# Patient Record
Sex: MALE | Race: WHITE | Employment: OTHER | URBAN - METROPOLITAN AREA
[De-identification: names, ages, dates, MRNs, and addresses within clinical notes are randomized per-mention and may not be internally consistent; named-entity substitution may affect disease eponyms.]

---

## 2017-12-04 ENCOUNTER — HOSPITAL ENCOUNTER (OUTPATIENT)
Age: 82
Setting detail: SPECIMEN
Discharge: HOME | End: 2017-12-04
Payer: MEDICARE

## 2017-12-07 LAB — DERMATOLOGY PATHOLOGY REPORT: NORMAL

## 2018-02-05 ENCOUNTER — HOSPITAL ENCOUNTER (OUTPATIENT)
Age: 83
Setting detail: SPECIMEN
Discharge: HOME | End: 2018-02-05
Payer: MEDICARE

## 2018-02-07 LAB — DERMATOLOGY PATHOLOGY REPORT: NORMAL

## 2019-12-05 ENCOUNTER — HOSPITAL ENCOUNTER (OUTPATIENT)
Age: 84
Setting detail: SPECIMEN
Discharge: HOME | End: 2019-12-05
Payer: MEDICARE

## 2019-12-09 LAB — DERMATOLOGY PATHOLOGY REPORT: NORMAL

## 2020-04-15 ENCOUNTER — APPOINTMENT (OUTPATIENT)
Dept: CT IMAGING | Age: 85
DRG: 280 | End: 2020-04-15
Payer: MEDICARE

## 2020-04-15 ENCOUNTER — APPOINTMENT (OUTPATIENT)
Dept: GENERAL RADIOLOGY | Age: 85
DRG: 280 | End: 2020-04-15
Payer: MEDICARE

## 2020-04-15 ENCOUNTER — HOSPITAL ENCOUNTER (INPATIENT)
Age: 85
LOS: 5 days | Discharge: HOME | DRG: 280 | End: 2020-04-20
Attending: EMERGENCY MEDICINE | Admitting: INTERNAL MEDICINE
Payer: MEDICARE

## 2020-04-15 PROBLEM — I48.91 ATRIAL FIBRILLATION WITH RVR (HCC): Status: ACTIVE | Noted: 2020-04-15

## 2020-04-15 LAB
ABSOLUTE EOS #: 0 K/UL (ref 0–0.4)
ABSOLUTE IMMATURE GRANULOCYTE: ABNORMAL K/UL (ref 0–0.3)
ABSOLUTE LYMPH #: 1.2 K/UL (ref 1–4.8)
ABSOLUTE MONO #: 0.8 K/UL (ref 0.1–1.3)
ALBUMIN SERPL-MCNC: 3.8 G/DL (ref 3.5–5.2)
ALBUMIN/GLOBULIN RATIO: ABNORMAL (ref 1–2.5)
ALP BLD-CCNC: 82 U/L (ref 40–129)
ALT SERPL-CCNC: 18 U/L (ref 5–41)
ANION GAP SERPL CALCULATED.3IONS-SCNC: 12 MMOL/L (ref 9–17)
AST SERPL-CCNC: 25 U/L
BASOPHILS # BLD: 1 % (ref 0–2)
BASOPHILS ABSOLUTE: 0.1 K/UL (ref 0–0.2)
BILIRUB SERPL-MCNC: 1.25 MG/DL (ref 0.3–1.2)
BNP INTERPRETATION: ABNORMAL
BUN BLDV-MCNC: 19 MG/DL (ref 8–23)
BUN/CREAT BLD: ABNORMAL (ref 9–20)
C-REACTIVE PROTEIN: 66.1 MG/L (ref 0–5)
CALCIUM SERPL-MCNC: 9.1 MG/DL (ref 8.6–10.4)
CHLORIDE BLD-SCNC: 106 MMOL/L (ref 98–107)
CO2: 21 MMOL/L (ref 20–31)
CREAT SERPL-MCNC: 1.16 MG/DL (ref 0.7–1.2)
D-DIMER QUANTITATIVE: 0.99 MG/L FEU (ref 0–0.59)
DIFFERENTIAL TYPE: ABNORMAL
EOSINOPHILS RELATIVE PERCENT: 0 % (ref 0–4)
GFR AFRICAN AMERICAN: >60 ML/MIN
GFR NON-AFRICAN AMERICAN: >60 ML/MIN
GFR SERPL CREATININE-BSD FRML MDRD: ABNORMAL ML/MIN/{1.73_M2}
GFR SERPL CREATININE-BSD FRML MDRD: ABNORMAL ML/MIN/{1.73_M2}
GLUCOSE BLD-MCNC: 128 MG/DL (ref 70–99)
HCT VFR BLD CALC: 40.2 % (ref 41–53)
HEMOGLOBIN: 13.6 G/DL (ref 13.5–17.5)
IMMATURE GRANULOCYTES: ABNORMAL %
INR BLD: 1.1
LACTATE DEHYDROGENASE: 236 U/L (ref 135–225)
LACTIC ACID, WHOLE BLOOD: NORMAL MMOL/L (ref 0.7–2.1)
LACTIC ACID: 2.1 MMOL/L (ref 0.5–2.2)
LIPASE: 18 U/L (ref 13–60)
LYMPHOCYTES # BLD: 12 % (ref 24–44)
MAGNESIUM: 2.2 MG/DL (ref 1.6–2.6)
MCH RBC QN AUTO: 31.9 PG (ref 26–34)
MCHC RBC AUTO-ENTMCNC: 33.9 G/DL (ref 31–37)
MCV RBC AUTO: 94 FL (ref 80–100)
MONOCYTES # BLD: 9 % (ref 1–7)
NRBC AUTOMATED: ABNORMAL PER 100 WBC
PARTIAL THROMBOPLASTIN TIME: 30.5 SEC (ref 24–36)
PDW BLD-RTO: 14.6 % (ref 11.5–14.9)
PLATELET # BLD: 192 K/UL (ref 150–450)
PLATELET ESTIMATE: ABNORMAL
PMV BLD AUTO: 8.7 FL (ref 6–12)
POTASSIUM SERPL-SCNC: 4.7 MMOL/L (ref 3.7–5.3)
PRO-BNP: 5632 PG/ML
PROCALCITONIN: 0.07 NG/ML
PROTHROMBIN TIME: 14.6 SEC (ref 11.8–14.6)
RBC # BLD: 4.27 M/UL (ref 4.5–5.9)
RBC # BLD: ABNORMAL 10*6/UL
SEG NEUTROPHILS: 78 % (ref 36–66)
SEGMENTED NEUTROPHILS ABSOLUTE COUNT: 7.7 K/UL (ref 1.3–9.1)
SODIUM BLD-SCNC: 139 MMOL/L (ref 135–144)
THYROXINE, FREE: 1.39 NG/DL (ref 0.93–1.7)
TOTAL PROTEIN: 6.9 G/DL (ref 6.4–8.3)
TROPONIN INTERP: ABNORMAL
TROPONIN INTERP: ABNORMAL
TROPONIN T: ABNORMAL NG/ML
TROPONIN T: ABNORMAL NG/ML
TROPONIN, HIGH SENSITIVITY: 44 NG/L (ref 0–22)
TROPONIN, HIGH SENSITIVITY: 47 NG/L (ref 0–22)
TSH SERPL DL<=0.05 MIU/L-ACNC: 7.01 MIU/L (ref 0.3–5)
WBC # BLD: 9.7 K/UL (ref 3.5–11)
WBC # BLD: ABNORMAL 10*3/UL

## 2020-04-15 PROCEDURE — 84145 PROCALCITONIN (PCT): CPT

## 2020-04-15 PROCEDURE — 84439 ASSAY OF FREE THYROXINE: CPT

## 2020-04-15 PROCEDURE — 85610 PROTHROMBIN TIME: CPT

## 2020-04-15 PROCEDURE — 83615 LACTATE (LD) (LDH) ENZYME: CPT

## 2020-04-15 PROCEDURE — 85520 HEPARIN ASSAY: CPT

## 2020-04-15 PROCEDURE — 6360000002 HC RX W HCPCS: Performed by: EMERGENCY MEDICINE

## 2020-04-15 PROCEDURE — 85730 THROMBOPLASTIN TIME PARTIAL: CPT

## 2020-04-15 PROCEDURE — 71045 X-RAY EXAM CHEST 1 VIEW: CPT

## 2020-04-15 PROCEDURE — 83690 ASSAY OF LIPASE: CPT

## 2020-04-15 PROCEDURE — 2500000003 HC RX 250 WO HCPCS: Performed by: EMERGENCY MEDICINE

## 2020-04-15 PROCEDURE — 84443 ASSAY THYROID STIM HORMONE: CPT

## 2020-04-15 PROCEDURE — 36415 COLL VENOUS BLD VENIPUNCTURE: CPT

## 2020-04-15 PROCEDURE — 71260 CT THORAX DX C+: CPT

## 2020-04-15 PROCEDURE — 96366 THER/PROPH/DIAG IV INF ADDON: CPT

## 2020-04-15 PROCEDURE — 83880 ASSAY OF NATRIURETIC PEPTIDE: CPT

## 2020-04-15 PROCEDURE — 6360000004 HC RX CONTRAST MEDICATION: Performed by: EMERGENCY MEDICINE

## 2020-04-15 PROCEDURE — 2000000000 HC ICU R&B

## 2020-04-15 PROCEDURE — 83735 ASSAY OF MAGNESIUM: CPT

## 2020-04-15 PROCEDURE — 84484 ASSAY OF TROPONIN QUANT: CPT

## 2020-04-15 PROCEDURE — 83605 ASSAY OF LACTIC ACID: CPT

## 2020-04-15 PROCEDURE — 99285 EMERGENCY DEPT VISIT HI MDM: CPT

## 2020-04-15 PROCEDURE — 96365 THER/PROPH/DIAG IV INF INIT: CPT

## 2020-04-15 PROCEDURE — 80053 COMPREHEN METABOLIC PANEL: CPT

## 2020-04-15 PROCEDURE — 85379 FIBRIN DEGRADATION QUANT: CPT

## 2020-04-15 PROCEDURE — U0002 COVID-19 LAB TEST NON-CDC: HCPCS

## 2020-04-15 PROCEDURE — 6370000000 HC RX 637 (ALT 250 FOR IP): Performed by: EMERGENCY MEDICINE

## 2020-04-15 PROCEDURE — 85025 COMPLETE CBC W/AUTO DIFF WBC: CPT

## 2020-04-15 PROCEDURE — 2580000003 HC RX 258: Performed by: EMERGENCY MEDICINE

## 2020-04-15 PROCEDURE — 96375 TX/PRO/DX INJ NEW DRUG ADDON: CPT

## 2020-04-15 PROCEDURE — 93005 ELECTROCARDIOGRAM TRACING: CPT | Performed by: EMERGENCY MEDICINE

## 2020-04-15 PROCEDURE — 86140 C-REACTIVE PROTEIN: CPT

## 2020-04-15 RX ORDER — HEPARIN SODIUM 5000 [USP'U]/ML
4000 INJECTION, SOLUTION INTRAVENOUS; SUBCUTANEOUS ONCE
Status: COMPLETED | OUTPATIENT
Start: 2020-04-15 | End: 2020-04-15

## 2020-04-15 RX ORDER — HEPARIN SODIUM 10000 [USP'U]/100ML
9.9 INJECTION, SOLUTION INTRAVENOUS CONTINUOUS
Status: DISCONTINUED | OUTPATIENT
Start: 2020-04-15 | End: 2020-04-17

## 2020-04-15 RX ORDER — LORAZEPAM 2 MG/ML
1 INJECTION INTRAMUSCULAR ONCE
Status: COMPLETED | OUTPATIENT
Start: 2020-04-15 | End: 2020-04-15

## 2020-04-15 RX ORDER — PROMETHAZINE HYDROCHLORIDE 25 MG/1
12.5 TABLET ORAL EVERY 6 HOURS PRN
Status: DISCONTINUED | OUTPATIENT
Start: 2020-04-15 | End: 2020-04-20 | Stop reason: HOSPADM

## 2020-04-15 RX ORDER — HEPARIN SODIUM 5000 [USP'U]/ML
4000 INJECTION, SOLUTION INTRAVENOUS; SUBCUTANEOUS PRN
Status: DISCONTINUED | OUTPATIENT
Start: 2020-04-15 | End: 2020-04-17

## 2020-04-15 RX ORDER — ASPIRIN 81 MG/1
324 TABLET, CHEWABLE ORAL ONCE
Status: COMPLETED | OUTPATIENT
Start: 2020-04-15 | End: 2020-04-15

## 2020-04-15 RX ORDER — HEPARIN SODIUM 5000 [USP'U]/ML
2000 INJECTION, SOLUTION INTRAVENOUS; SUBCUTANEOUS PRN
Status: DISCONTINUED | OUTPATIENT
Start: 2020-04-15 | End: 2020-04-17

## 2020-04-15 RX ORDER — DILTIAZEM HYDROCHLORIDE 5 MG/ML
10 INJECTION INTRAVENOUS ONCE
Status: COMPLETED | OUTPATIENT
Start: 2020-04-15 | End: 2020-04-15

## 2020-04-15 RX ORDER — FLUOXETINE HYDROCHLORIDE 20 MG/1
40 CAPSULE ORAL DAILY
Status: ON HOLD | COMMUNITY
Start: 2020-03-24 | End: 2020-10-23 | Stop reason: HOSPADM

## 2020-04-15 RX ORDER — SODIUM CHLORIDE 0.9 % (FLUSH) 0.9 %
10 SYRINGE (ML) INJECTION EVERY 12 HOURS SCHEDULED
Status: DISCONTINUED | OUTPATIENT
Start: 2020-04-15 | End: 2020-04-20 | Stop reason: HOSPADM

## 2020-04-15 RX ORDER — POTASSIUM CHLORIDE 20 MEQ/1
40 TABLET, EXTENDED RELEASE ORAL 2 TIMES DAILY
Status: ON HOLD | COMMUNITY
End: 2020-04-20 | Stop reason: HOSPADM

## 2020-04-15 RX ORDER — TRAZODONE HYDROCHLORIDE 50 MG/1
25-75 TABLET ORAL NIGHTLY PRN
Status: ON HOLD | COMMUNITY
Start: 2020-03-24 | End: 2020-10-23 | Stop reason: HOSPADM

## 2020-04-15 RX ORDER — LEVOTHYROXINE SODIUM 0.07 MG/1
75 TABLET ORAL DAILY
COMMUNITY
Start: 2019-08-23 | End: 2022-01-24

## 2020-04-15 RX ORDER — ACETAMINOPHEN 650 MG/1
650 SUPPOSITORY RECTAL EVERY 6 HOURS PRN
Status: DISCONTINUED | OUTPATIENT
Start: 2020-04-15 | End: 2020-04-20 | Stop reason: HOSPADM

## 2020-04-15 RX ORDER — ATORVASTATIN CALCIUM 80 MG/1
80 TABLET, FILM COATED ORAL DAILY
Status: ON HOLD | COMMUNITY
Start: 2020-01-01 | End: 2022-01-28 | Stop reason: HOSPADM

## 2020-04-15 RX ORDER — ONDANSETRON 2 MG/ML
4 INJECTION INTRAMUSCULAR; INTRAVENOUS ONCE
Status: COMPLETED | OUTPATIENT
Start: 2020-04-15 | End: 2020-04-15

## 2020-04-15 RX ORDER — FUROSEMIDE 10 MG/ML
40 INJECTION INTRAMUSCULAR; INTRAVENOUS ONCE
Status: COMPLETED | OUTPATIENT
Start: 2020-04-15 | End: 2020-04-15

## 2020-04-15 RX ORDER — ALBUTEROL SULFATE 90 UG/1
2 AEROSOL, METERED RESPIRATORY (INHALATION) EVERY 6 HOURS PRN
Status: DISCONTINUED | OUTPATIENT
Start: 2020-04-15 | End: 2020-04-16 | Stop reason: RX

## 2020-04-15 RX ORDER — ACETAMINOPHEN 325 MG/1
650 TABLET ORAL EVERY 6 HOURS PRN
Status: DISCONTINUED | OUTPATIENT
Start: 2020-04-15 | End: 2020-04-20 | Stop reason: HOSPADM

## 2020-04-15 RX ORDER — MORPHINE SULFATE 4 MG/ML
4 INJECTION, SOLUTION INTRAMUSCULAR; INTRAVENOUS ONCE
Status: COMPLETED | OUTPATIENT
Start: 2020-04-15 | End: 2020-04-15

## 2020-04-15 RX ORDER — POLYETHYLENE GLYCOL 3350 17 G/17G
17 POWDER, FOR SOLUTION ORAL DAILY PRN
Status: DISCONTINUED | OUTPATIENT
Start: 2020-04-15 | End: 2020-04-20 | Stop reason: HOSPADM

## 2020-04-15 RX ORDER — BUPROPION HYDROCHLORIDE 150 MG/1
150 TABLET, EXTENDED RELEASE ORAL 2 TIMES DAILY
COMMUNITY
Start: 2020-02-16

## 2020-04-15 RX ORDER — ONDANSETRON 2 MG/ML
4 INJECTION INTRAMUSCULAR; INTRAVENOUS EVERY 6 HOURS PRN
Status: DISCONTINUED | OUTPATIENT
Start: 2020-04-15 | End: 2020-04-20 | Stop reason: HOSPADM

## 2020-04-15 RX ORDER — SODIUM CHLORIDE 0.9 % (FLUSH) 0.9 %
10 SYRINGE (ML) INJECTION PRN
Status: DISCONTINUED | OUTPATIENT
Start: 2020-04-15 | End: 2020-04-17

## 2020-04-15 RX ORDER — 0.9 % SODIUM CHLORIDE 0.9 %
80 INTRAVENOUS SOLUTION INTRAVENOUS ONCE
Status: COMPLETED | OUTPATIENT
Start: 2020-04-15 | End: 2020-04-15

## 2020-04-15 RX ORDER — SODIUM CHLORIDE 0.9 % (FLUSH) 0.9 %
10 SYRINGE (ML) INJECTION PRN
Status: DISCONTINUED | OUTPATIENT
Start: 2020-04-15 | End: 2020-04-20 | Stop reason: HOSPADM

## 2020-04-15 RX ORDER — AMLODIPINE BESYLATE 5 MG/1
5 TABLET ORAL DAILY
Status: ON HOLD | COMMUNITY
Start: 2019-10-17 | End: 2020-04-20 | Stop reason: HOSPADM

## 2020-04-15 RX ORDER — DILTIAZEM HYDROCHLORIDE 5 MG/ML
20 INJECTION INTRAVENOUS ONCE
Status: DISCONTINUED | OUTPATIENT
Start: 2020-04-15 | End: 2020-04-15

## 2020-04-15 RX ADMIN — DILTIAZEM HYDROCHLORIDE 10 MG: 5 INJECTION INTRAVENOUS at 16:37

## 2020-04-15 RX ADMIN — ASPIRIN 81 MG 324 MG: 81 TABLET ORAL at 15:19

## 2020-04-15 RX ADMIN — HEPARIN SODIUM 4000 UNITS: 5000 INJECTION INTRAVENOUS; SUBCUTANEOUS at 16:37

## 2020-04-15 RX ADMIN — HEPARIN SODIUM 9.9 UNITS/KG/HR: 10000 INJECTION, SOLUTION INTRAVENOUS at 16:38

## 2020-04-15 RX ADMIN — IOVERSOL 75 ML: 741 INJECTION INTRA-ARTERIAL; INTRAVENOUS at 17:21

## 2020-04-15 RX ADMIN — FUROSEMIDE 40 MG: 10 INJECTION, SOLUTION INTRAMUSCULAR; INTRAVENOUS at 18:31

## 2020-04-15 RX ADMIN — Medication 10 ML: at 17:21

## 2020-04-15 RX ADMIN — ONDANSETRON 4 MG: 2 INJECTION INTRAMUSCULAR; INTRAVENOUS at 16:04

## 2020-04-15 RX ADMIN — MORPHINE SULFATE 4 MG: 4 INJECTION, SOLUTION INTRAMUSCULAR; INTRAVENOUS at 16:04

## 2020-04-15 RX ADMIN — SODIUM CHLORIDE 80 ML: 9 INJECTION, SOLUTION INTRAVENOUS at 17:21

## 2020-04-15 RX ADMIN — LORAZEPAM 1 MG: 2 INJECTION INTRAMUSCULAR; INTRAVENOUS at 16:03

## 2020-04-15 RX ADMIN — DILTIAZEM HYDROCHLORIDE 5 MG/HR: 5 INJECTION INTRAVENOUS at 16:37

## 2020-04-15 ASSESSMENT — ENCOUNTER SYMPTOMS
BACK PAIN: 0
CONSTIPATION: 0
COUGH: 0
COLOR CHANGE: 0
TROUBLE SWALLOWING: 0
SORE THROAT: 0
VOMITING: 0
SHORTNESS OF BREATH: 1
DIARRHEA: 0
ABDOMINAL PAIN: 0
NAUSEA: 0
BLOOD IN STOOL: 0

## 2020-04-15 ASSESSMENT — PAIN SCALES - GENERAL
PAINLEVEL_OUTOF10: 0
PAINLEVEL_OUTOF10: 0

## 2020-04-15 NOTE — ED NOTES
Report given to BRAYAN Pratt from [facility]. Report method in person   The following was reviewed with receiving RN:   Current vital signs:  BP (!) 145/95   Pulse 127   Temp 97.8 °F (36.6 °C) (Oral)   Resp 19   Wt 222 lb (100.7 kg)   SpO2 98%                MEWS Score: 6     Any medication (asa, zofran, morphine, ativan, lasix, heparin, cardiazem) or safety alerts were reviewed. Any pending diagnostics (trop) notifications were also reviewed, as well as any safety concerns or issues, abnormal labs (d-dimer, trop, LD, Pro-BNP, CRP), abnormal imaging (CXR, CT), and abnormal assessment findings (upper resp. Wheezing, tachypneic). Questions were answered. IV placements and locations noted.              Simeon Rod RN  04/15/20 1954

## 2020-04-15 NOTE — ED PROVIDER NOTES
[address] ED  eMERGENCY dEPARTMENT eNCOUnter    Pt Name: Angelito Addison  MRN: [id number]  YOB: 1935  Date of evaluation:4/15/20  PCP: Dahiana Blancas MD    CHIEF COMPLAINT       Chief Complaint   Patient presents with    Chest Pain    Shortness of Breath       HISTORY OF PRESENT ILLNESS    Angelito Addison is a 82 y.o. male who presents with a chief complaint of left-sided chest pain and palpitations. Patient states his symptoms started about a day and a half ago but he is not sure exactly. Pain is left-sided, nonradiating and sharp. Nothing make symptoms better or worse. Feels mildly short of breath as well. No fevers, coughs or other illnesses. He has a history of atrial fibrillation however he is a very poor historian about this. Symptoms are acute. Symptoms are moderate. Nothing makes symptoms better or worse. Patient has no other complaints at this time. REVIEW OF SYSTEMS       Review of Systems   Constitutional: Negative for chills, fatigue and fever. HENT: Negative for congestion, ear pain, sore throat and trouble swallowing. Eyes: Negative for visual disturbance. Respiratory: Positive for shortness of breath. Negative for cough. Cardiovascular: Positive for chest pain and palpitations. Negative for leg swelling. Gastrointestinal: Negative for abdominal pain, blood in stool, constipation, diarrhea, nausea and vomiting. Genitourinary: Negative for dysuria and flank pain. Musculoskeletal: Negative for arthralgias, back pain, myalgias and neck pain. Skin: Negative for color change, rash and wound. Neurological: Negative for dizziness, weakness, light-headedness, numbness and headaches. Psychiatric/Behavioral: Negative for confusion. All other systems reviewed and are negative. Negativein 10 essential Systems except as mentioned above and in the HPI.         PAST MEDICAL HISTORY     Past Medical History:   Diagnosis Date    A-fib [facility])          SURGICAL irregular concerning for atrial fibrillation with RVR. He also has a left bundle branch block. His blood pressure is normal at this time. He is not altered. I do not think we need to emergently cardiovert him at this time however I do want to compare this to his prior EKG which was done at another facility prior to starting any rate control medications to see if this bundle branch block is new. 3:38 PM EDT  EKG from October 2019 reviewed which shows a sinus rhythm. There is no bundle branch block or evidence of atrial fibrillation. Will contact cardiology. 3:50 PM EDT  I spoke with cardiology Dr. Paolo Terraazs and discussed case including the patient's new atrial fibrillation with bundle branch block. He is requesting that we start patient on IV Cardizem. Also requesting anticoagulation with heparin. His d-dimer is also elevated so I am going to get CT scan of his chest to rule out PE.      6:19 PM EDT  There is no pulmonary embolism on CT scan. CT scan actually does not show any evidence of pneumonia. It looks like he has pulmonary congestion, pulmonary edema likely due to CHF. Also moderate bilateral pleural effusions. I spoke with Dr. Mau Arshad pulmonology about the case. He is still recommending that we test for coronavirus 19 and keep patient in isolation as his lab work does suggest coronavirus 19 with elevated CRP, LDH and lymphopenia. I spoke with Dr. Bethany Vogt who accepted admission. Heart rate is still in the low 100s on Cardizem infusion at this time. Blood pressure has decreased but is still stable in normal range. We will give a dose of IV Lasix however I do not feel comfortable starting patient on nitroglycerin infusion for CHF exacerbation at this time due to decrease in blood pressure. Will admit to intermediate ICU.          CRITICALCARE:  CRITICAL CARE: There was a high probability of clinically significant/life threatening deterioration in this patient's condition which required my

## 2020-04-16 PROBLEM — E03.9 ACQUIRED HYPOTHYROIDISM: Status: ACTIVE | Noted: 2020-04-16

## 2020-04-16 PROBLEM — I21.4 NSTEMI (NON-ST ELEVATED MYOCARDIAL INFARCTION) (HCC): Status: ACTIVE | Noted: 2020-04-16

## 2020-04-16 PROBLEM — I44.7 LBBB (LEFT BUNDLE BRANCH BLOCK): Status: ACTIVE | Noted: 2020-04-16

## 2020-04-16 PROBLEM — Z86.73 H/O TIA (TRANSIENT ISCHEMIC ATTACK) AND STROKE: Status: ACTIVE | Noted: 2020-04-16

## 2020-04-16 PROBLEM — I10 ESSENTIAL HYPERTENSION: Status: ACTIVE | Noted: 2020-04-16

## 2020-04-16 PROBLEM — I50.21 ACUTE SYSTOLIC CONGESTIVE HEART FAILURE (HCC): Status: ACTIVE | Noted: 2020-04-16

## 2020-04-16 PROBLEM — R77.8 ELEVATED TROPONIN: Status: ACTIVE | Noted: 2020-04-16

## 2020-04-16 LAB
ALBUMIN SERPL-MCNC: 3.1 G/DL (ref 3.5–5.2)
ALBUMIN/GLOBULIN RATIO: ABNORMAL (ref 1–2.5)
ALP BLD-CCNC: 68 U/L (ref 40–129)
ALT SERPL-CCNC: 17 U/L (ref 5–41)
ANION GAP SERPL CALCULATED.3IONS-SCNC: 11 MMOL/L (ref 9–17)
ANTI-XA UNFRAC HEPARIN: 0.16 IU/L (ref 0.3–0.7)
ANTI-XA UNFRAC HEPARIN: 0.22 IU/L (ref 0.3–0.7)
ANTI-XA UNFRAC HEPARIN: 0.4 IU/L (ref 0.3–0.7)
ANTI-XA UNFRAC HEPARIN: 0.46 IU/L (ref 0.3–0.7)
AST SERPL-CCNC: 22 U/L
BILIRUB SERPL-MCNC: 1.06 MG/DL (ref 0.3–1.2)
BUN BLDV-MCNC: 18 MG/DL (ref 8–23)
BUN/CREAT BLD: ABNORMAL (ref 9–20)
CALCIUM SERPL-MCNC: 8.1 MG/DL (ref 8.6–10.4)
CHLORIDE BLD-SCNC: 106 MMOL/L (ref 98–107)
CO2: 24 MMOL/L (ref 20–31)
CREAT SERPL-MCNC: 0.97 MG/DL (ref 0.7–1.2)
EKG ATRIAL RATE: 89 BPM
EKG Q-T INTERVAL: 344 MS
EKG QRS DURATION: 150 MS
EKG QTC CALCULATION (BAZETT): 529 MS
EKG R AXIS: 171 DEGREES
EKG T AXIS: 60 DEGREES
EKG VENTRICULAR RATE: 142 BPM
FERRITIN: 124 UG/L (ref 30–400)
GFR AFRICAN AMERICAN: >60 ML/MIN
GFR NON-AFRICAN AMERICAN: >60 ML/MIN
GFR SERPL CREATININE-BSD FRML MDRD: ABNORMAL ML/MIN/{1.73_M2}
GFR SERPL CREATININE-BSD FRML MDRD: ABNORMAL ML/MIN/{1.73_M2}
GLUCOSE BLD-MCNC: 105 MG/DL (ref 70–99)
LV EF: 25 %
LVEF MODALITY: NORMAL
POTASSIUM SERPL-SCNC: 3.6 MMOL/L (ref 3.7–5.3)
SARS-COV-2, PCR: NORMAL
SARS-COV-2: NOT DETECTED
SODIUM BLD-SCNC: 141 MMOL/L (ref 135–144)
SOURCE: NORMAL
SOURCE: NORMAL
TOTAL PROTEIN: 5.9 G/DL (ref 6.4–8.3)
TROPONIN INTERP: ABNORMAL
TROPONIN INTERP: ABNORMAL
TROPONIN T: ABNORMAL NG/ML
TROPONIN T: ABNORMAL NG/ML
TROPONIN, HIGH SENSITIVITY: 47 NG/L (ref 0–22)
TROPONIN, HIGH SENSITIVITY: 47 NG/L (ref 0–22)

## 2020-04-16 PROCEDURE — 2700000000 HC OXYGEN THERAPY PER DAY

## 2020-04-16 PROCEDURE — 84484 ASSAY OF TROPONIN QUANT: CPT

## 2020-04-16 PROCEDURE — 99221 1ST HOSP IP/OBS SF/LOW 40: CPT | Performed by: INTERNAL MEDICINE

## 2020-04-16 PROCEDURE — 94761 N-INVAS EAR/PLS OXIMETRY MLT: CPT

## 2020-04-16 PROCEDURE — 2580000003 HC RX 258: Performed by: INTERNAL MEDICINE

## 2020-04-16 PROCEDURE — 6370000000 HC RX 637 (ALT 250 FOR IP): Performed by: INTERNAL MEDICINE

## 2020-04-16 PROCEDURE — 6360000004 HC RX CONTRAST MEDICATION: Performed by: INTERNAL MEDICINE

## 2020-04-16 PROCEDURE — 36415 COLL VENOUS BLD VENIPUNCTURE: CPT

## 2020-04-16 PROCEDURE — 99291 CRITICAL CARE FIRST HOUR: CPT | Performed by: INTERNAL MEDICINE

## 2020-04-16 PROCEDURE — 80053 COMPREHEN METABOLIC PANEL: CPT

## 2020-04-16 PROCEDURE — 6360000002 HC RX W HCPCS: Performed by: INTERNAL MEDICINE

## 2020-04-16 PROCEDURE — 2060000000 HC ICU INTERMEDIATE R&B

## 2020-04-16 PROCEDURE — 85520 HEPARIN ASSAY: CPT

## 2020-04-16 PROCEDURE — 6370000000 HC RX 637 (ALT 250 FOR IP): Performed by: NURSE PRACTITIONER

## 2020-04-16 PROCEDURE — 94640 AIRWAY INHALATION TREATMENT: CPT

## 2020-04-16 PROCEDURE — 82728 ASSAY OF FERRITIN: CPT

## 2020-04-16 PROCEDURE — C8929 TTE W OR WO FOL WCON,DOPPLER: HCPCS

## 2020-04-16 PROCEDURE — 2500000003 HC RX 250 WO HCPCS: Performed by: INTERNAL MEDICINE

## 2020-04-16 RX ORDER — LEVOTHYROXINE SODIUM 0.07 MG/1
75 TABLET ORAL DAILY
Status: DISCONTINUED | OUTPATIENT
Start: 2020-04-16 | End: 2020-04-20 | Stop reason: HOSPADM

## 2020-04-16 RX ORDER — ASPIRIN 81 MG/1
81 TABLET, CHEWABLE ORAL DAILY
Status: DISCONTINUED | OUTPATIENT
Start: 2020-04-16 | End: 2020-04-20 | Stop reason: HOSPADM

## 2020-04-16 RX ORDER — AMLODIPINE BESYLATE 5 MG/1
5 TABLET ORAL DAILY
Status: DISCONTINUED | OUTPATIENT
Start: 2020-04-16 | End: 2020-04-18

## 2020-04-16 RX ORDER — POTASSIUM CHLORIDE 20 MEQ/1
40 TABLET, EXTENDED RELEASE ORAL 2 TIMES DAILY
Status: DISCONTINUED | OUTPATIENT
Start: 2020-04-16 | End: 2020-04-20 | Stop reason: HOSPADM

## 2020-04-16 RX ORDER — ATORVASTATIN CALCIUM 80 MG/1
80 TABLET, FILM COATED ORAL DAILY
Status: DISCONTINUED | OUTPATIENT
Start: 2020-04-16 | End: 2020-04-20 | Stop reason: HOSPADM

## 2020-04-16 RX ORDER — FUROSEMIDE 10 MG/ML
40 INJECTION INTRAMUSCULAR; INTRAVENOUS DAILY
Status: DISCONTINUED | OUTPATIENT
Start: 2020-04-17 | End: 2020-04-18

## 2020-04-16 RX ORDER — FUROSEMIDE 10 MG/ML
40 INJECTION INTRAMUSCULAR; INTRAVENOUS ONCE
Status: COMPLETED | OUTPATIENT
Start: 2020-04-16 | End: 2020-04-16

## 2020-04-16 RX ORDER — ALBUTEROL SULFATE 2.5 MG/3ML
2.5 SOLUTION RESPIRATORY (INHALATION) EVERY 6 HOURS PRN
Status: DISCONTINUED | OUTPATIENT
Start: 2020-04-16 | End: 2020-04-20 | Stop reason: HOSPADM

## 2020-04-16 RX ORDER — BUPROPION HYDROCHLORIDE 150 MG/1
150 TABLET, EXTENDED RELEASE ORAL 2 TIMES DAILY
Status: DISCONTINUED | OUTPATIENT
Start: 2020-04-16 | End: 2020-04-20 | Stop reason: HOSPADM

## 2020-04-16 RX ORDER — TRAZODONE HYDROCHLORIDE 50 MG/1
50 TABLET ORAL NIGHTLY PRN
Status: DISCONTINUED | OUTPATIENT
Start: 2020-04-16 | End: 2020-04-20 | Stop reason: HOSPADM

## 2020-04-16 RX ORDER — FLUOXETINE HYDROCHLORIDE 20 MG/1
40 CAPSULE ORAL DAILY
Status: DISCONTINUED | OUTPATIENT
Start: 2020-04-16 | End: 2020-04-20 | Stop reason: HOSPADM

## 2020-04-16 RX ADMIN — POTASSIUM CHLORIDE 40 MEQ: 1500 TABLET, EXTENDED RELEASE ORAL at 08:07

## 2020-04-16 RX ADMIN — ATORVASTATIN CALCIUM 80 MG: 80 TABLET, FILM COATED ORAL at 08:07

## 2020-04-16 RX ADMIN — DILTIAZEM HYDROCHLORIDE 10 MG/HR: 5 INJECTION INTRAVENOUS at 01:05

## 2020-04-16 RX ADMIN — PERFLUTREN 2.2 MG: 6.52 INJECTION, SUSPENSION INTRAVENOUS at 10:05

## 2020-04-16 RX ADMIN — BUPROPION HYDROCHLORIDE 150 MG: 150 TABLET, EXTENDED RELEASE ORAL at 20:19

## 2020-04-16 RX ADMIN — Medication 10 ML: at 20:21

## 2020-04-16 RX ADMIN — HEPARIN SODIUM 14 UNITS/KG/HR: 10000 INJECTION, SOLUTION INTRAVENOUS at 12:35

## 2020-04-16 RX ADMIN — LEVOTHYROXINE SODIUM 75 MCG: 75 TABLET ORAL at 06:27

## 2020-04-16 RX ADMIN — ASPIRIN 81 MG 81 MG: 81 TABLET ORAL at 12:10

## 2020-04-16 RX ADMIN — FLUOXETINE HYDROCHLORIDE 40 MG: 20 CAPSULE ORAL at 08:07

## 2020-04-16 RX ADMIN — TRAZODONE HYDROCHLORIDE 50 MG: 50 TABLET ORAL at 23:37

## 2020-04-16 RX ADMIN — HEPARIN SODIUM 2000 UNITS: 5000 INJECTION INTRAVENOUS; SUBCUTANEOUS at 00:34

## 2020-04-16 RX ADMIN — HEPARIN SODIUM 2000 UNITS: 5000 INJECTION INTRAVENOUS; SUBCUTANEOUS at 12:36

## 2020-04-16 RX ADMIN — FUROSEMIDE 40 MG: 10 INJECTION, SOLUTION INTRAMUSCULAR; INTRAVENOUS at 08:07

## 2020-04-16 RX ADMIN — Medication 10 ML: at 08:07

## 2020-04-16 RX ADMIN — BUPROPION HYDROCHLORIDE 150 MG: 150 TABLET, EXTENDED RELEASE ORAL at 08:07

## 2020-04-16 RX ADMIN — POTASSIUM CHLORIDE 40 MEQ: 1500 TABLET, EXTENDED RELEASE ORAL at 20:19

## 2020-04-16 ASSESSMENT — PAIN SCALES - GENERAL
PAINLEVEL_OUTOF10: 0
PAINLEVEL_OUTOF10: 0

## 2020-04-16 NOTE — CONSULTS
[address] [facility]                 [address] [facility], [address]                                  CONSULTATION    PATIENT NAME: Estella Marinelli                    :        1935  MED REC NO:   [id number]                              ROOM:       2002  ACCOUNT NO:   [de-identified]                           ADMIT DATE: 04/15/2020  PROVIDER:     Jeancarlos Rene    CONSULT DATE:  2020    HISTORY OF PRESENT ILLNESS:  The patient is an 51-year-old gentleman who  was in his usual state of health up until two days ago when we started  to notice intermittent fast heart rate. Yesterday, he had some nausea  without vomiting, a mild headache and shortness of breath. He also had  left-sided chest pain _____ and he went into the emergency room. He was  found to be in atrial fibrillation with rapid ventricular response. He  was tachypneic and very dyspneic. He was ruled out for pulmonary  embolism with a CT of the chest.  The CT did show bilateral pleural  effusions. He was started on heparin and Cardizem drips. The patient denies any cough, any fevers, chills, pleurisy. He denies  any sinus tenderness or drainage. He said that he lost his sense of  taste and smell years ago. He is essentially a nonsmoker, quitting over  60 years ago. He smoked when he was in the [employer] in [facility]. He is currently a  at a [employer] in [place]. ALLERGIES:  No known drug allergies. MEDICATIONS:  Medication list was reviewed. He is not on any pulmonary  medications. PAST MEDICAL HISTORY:  Significant for intermittent episodes of atrial  fibrillation, hypothyroidism, hypertension. SOCIAL HISTORY  Nonsmoker as noted above. No alcohol or illicit drug  use. FAMILY HISTORY:  Noncontributory. REVIEW OF SYSTEMS:  As in present illness, otherwise all systems  reviewed and are otherwise negative.     PHYSICAL EXAMINATION:  GENERAL APPEARANCE:  Elderly gentleman

## 2020-04-16 NOTE — PROGRESS NOTES
Breath Sounds: Significantly diminished with normal breaths  RR: 20  Pulse Sat: 95% with 4 LPM NC  Home Meds:  No Resp Related Meds on file and pt reports that he hasn't ever used an inhaler or nebulizer tx    Pt hasn't smoked since the [employer], which was several decades ago, per pt. No shortness of breath resting or when moving around at this time. Pt demonstrated an effective, but dry cough. Pt does report that occasionally he has trouble clearing his lungs with a cough. Pt diminished breath sounds can be attributed to effusions and shallow breathing. I educated on deep breathing and cough exercises. · Bronchodilator assessment at level: PRN Albuterol Inhaler is all that is warranted at this time.   · []    Home Level  BRONCHODILATOR ASSESSMENT SCORE  Score 0 1 2 3 4 5   Breath Sounds   []  Patient Baseline []  No Wheeze good aeration []  Faint, scattered wheezing, good aeration [x]  Expiratory Wheezing and or moderately diminished []  Insp/Exp wheeze and/or very diminished []  Insp/Exp and/ or marked distress   Respiratory Rate   []  Patient Baseline []  Less than 20 []  Less than 20 [x]  20-25 []  Greater than 25 []  Greater than 25   Peak flow % of Pred or PB [x]  NA   []  Greater than 90%  []  81-90% []  71-80% []  Less than or equal to 70%  or unable to perform []  Unable due to Respiratory Distress   Dyspnea re []  Patient Baseline [x]  No SOB []  No SOB []  SOB on exertion []  SOB min activity []  At rest/acute   e FEV% Predicted       [x]  NA []  Above 69%  []  Unable []  Above 60-69%  []  Unable []  Above 50-59%  []  Unable []  Above 35-49%  []  Unable []  Less than 35%  []  Unable

## 2020-04-16 NOTE — CONSULTS
Full note dictated    CHF  afib with RVR  bilaterl pleural effusions    Negative coronavirus 19  Can be taken out of isolation  Cardiac issues need to be worked on  Lasix IV x1

## 2020-04-16 NOTE — H&P
[facility] 55    HISTORY AND PHYSICAL EXAMINATION            Date:   4/16/2020  Patient name:  Kathie Castellanos  Date of admission:  4/15/2020  3:05 PM  MRN:   [id number]  Account:  [de-identified]  YOB: 1935  PCP:    Daiana Fried MD  Room:   2002/2002-01  Code Status:    Full Code    Chief Complaint:     Chief Complaint   Patient presents with    Chest Pain    Shortness of Breath       History Obtained From:     patient, electronic medical record    History of Present Illness: The patient is a 82 y.o. Non-/non  male who presents with Chest Pain and Shortness of Breath   and he is admitted to the hospital for the management of palpitation, shortness of breath, left-sided chest pain. Symptoms started 2 days back. Patient has history of hypertension, hypothyroidism, TIA in the past,  He mentioned that he noticed palpitation, shortness of breath, symptoms going on for last 2 days. Associated with chest discomfort, symptoms progressively getting worse that make him come to the hospital.  In the emergency room, patient found to have atrial fibrillation, he was also found to have left bundle branch block. Which is reported is new. Patient started on heparin infusion, Cardizem drip  Patient also had elevated BNP, CT chest was done, concerning for bilateral pleural effusion. Past Medical History:     Past Medical History:   Diagnosis Date    A-fib [facility])         Past Surgical History:     History reviewed. No pertinent surgical history. Medications Prior to Admission:     Prior to Admission medications    Medication Sig Start Date End Date Taking?  Authorizing Provider   amLODIPine (NORVASC) 5 MG tablet Take 5 mg by mouth daily 10/17/19  Yes Historical Provider, MD   FLUoxetine (PROZAC) 20 MG capsule Take 40 mg by mouth daily  3/24/20  Yes Historical Provider, MD   atorvastatin (LIPITOR) 80 MG tablet Take 80 mg by mouth daily 1/1/20  Yes Historical Provider, MD   buPROPion [facility] SR) 150 MG extended release tablet Take 150 mg by mouth 2 times daily 2/16/20  Yes Historical Provider, MD   levothyroxine (SYNTHROID) 75 MCG tablet Take 75 mcg by mouth daily 8/23/19  Yes Historical Provider, MD   traZODone (DESYREL) 50 MG tablet Take 25-75 mg by mouth nightly as needed for Sleep 3/24/20  Yes Historical Provider, MD   potassium chloride (KLOR-CON M) 20 MEQ extended release tablet Take 40 mEq by mouth 2 times daily   Yes Historical Provider, MD        Allergies:     Patient has no known allergies. Social History:     Tobacco:    reports that he has never smoked. He has never used smokeless tobacco.  Alcohol:      has no history on file for alcohol. Drug Use:  has no history on file for drug. Family History:     History reviewed. No pertinent family history. Review of Systems:     Positive and Negative as described in HPI.     CONSTITUTIONAL:  negative for fevers, chills, sweats, fatigue, weight loss  HEENT:  negative for vision, hearing changes, runny nose, throat pain  RESPIRATORY: Positive shortness of breath  CARDIOVASCULAR: Positive for palpitation, occasional chest pain mainly on the left side  GASTROINTESTINAL:  negative for nausea, vomiting, diarrhea, constipation, change in bowel habits, abdominal pain   GENITOURINARY:  negative for difficulty of urination, burning with urination, frequency   INTEGUMENT:  negative for rash, skin lesions, easy bruising   HEMATOLOGIC/LYMPHATIC:  negative for swelling/edema   ALLERGIC/IMMUNOLOGIC:  negative for urticaria , itching  ENDOCRINE:  negative increase in drinking, increase in urination, hot or cold intolerance  MUSCULOSKELETAL:  negative joint pains, muscle aches, swelling of joints  NEUROLOGICAL:  negative for headaches, dizziness, lightheadedness, numbness, pain, tingling extremities  BEHAVIOR/PSYCH:  negative for depression, anxiety    Physical Exam:   BP (!) 164/125 Pulse 99   Temp 97.7 °F (36.5 °C) (Axillary)   Resp 27   Ht 6' 6\" (1.981 m)   Wt 205 lb 4 oz (93.1 kg)   SpO2 94%   BMI 23.72 kg/m²   Temp (24hrs), Av.8 °F (36.6 °C), Min:97.6 °F (36.4 °C), Max:97.9 °F (36.6 °C)    No results for input(s): POCGLU in the last 72 hours. Intake/Output Summary (Last 24 hours) at 2020 0919  Last data filed at 2020 0600  Gross per 24 hour   Intake 241.26 ml   Output 1725 ml   Net -1483.74 ml       General Appearance:  alert, well appearing, and in no acute distress  Mental status: oriented to person, place, and time with normal affect  Head:  normocephalic, atraumatic.   Eye: no icterus, redness, pupils equal and reactive, extraocular eye movements intact, conjunctiva clear  Ear: normal external ear, no discharge, hearing intact  Nose:  no drainage noted  Mouth: mucous membranes moist  Neck: supple, no carotid bruits, thyroid not palpable, elevated JVD  Lungs: Air entry bilateral decreased, no rales  Cardiovascular: Irregularly irregular heart rate  Abdomen: Soft, nontender, nondistended, normal bowel sounds, no hepatomegaly or splenomegaly  Neurologic: There are no new focal motor or sensory deficits, normal muscle tone and bulk, no abnormal sensation, normal speech, cranial nerves II through XII grossly intact  Skin: No gross lesions, rashes, bruising or bleeding on exposed skin area  Extremities:  peripheral pulses palpable, no pedal edema or calf pain with palpation  Psych: normal affect     Investigations:      Laboratory Testing:  Recent Results (from the past 24 hour(s))   Troponin    Collection Time: 04/15/20  3:05 PM   Result Value Ref Range    Troponin, High Sensitivity 44 (H) 0 - 22 ng/L    Troponin T NOT REPORTED <0.03 ng/mL    Troponin Interp NOT REPORTED    CBC Auto Differential    Collection Time: 04/15/20  3:05 PM   Result Value Ref Range    WBC 9.7 3.5 - 11.0 k/uL    RBC 4.27 (L) 4.5 - 5.9 m/uL    Hemoglobin 13.6 13.5 - 17.5 g/dL    Hematocrit 40.2 3:05 PM   Result Value Ref Range    D-Dimer, Quant 0.99 (H) 0.00 - 0.59 mg/L FEU   Brain Natriuretic Peptide    Collection Time: 04/15/20  3:05 PM   Result Value Ref Range    Pro-BNP 5,632 (H) <300 pg/mL    BNP Interpretation Pro-BNP Reference Range:    LACTATE DEHYDROGENASE    Collection Time: 04/15/20  3:05 PM   Result Value Ref Range     (H) 135 - 225 U/L   C-Reactive Protein    Collection Time: 04/15/20  3:05 PM   Result Value Ref Range    CRP 66.1 (H) 0.0 - 5.0 mg/L   Procalcitonin    Collection Time: 04/15/20  3:05 PM   Result Value Ref Range    Procalcitonin 0.07 <0.09 ng/mL   Lactic Acid, Plasma    Collection Time: 04/15/20  3:05 PM   Result Value Ref Range    Lactic Acid 2.1 0.5 - 2.2 mmol/L    Lactic Acid, Whole Blood NOT REPORTED 0.7 - 2.1 mmol/L   Protime-INR    Collection Time: 04/15/20  3:05 PM   Result Value Ref Range    Protime 14.6 11.8 - 14.6 sec    INR 1.1    APTT    Collection Time: 04/15/20  3:05 PM   Result Value Ref Range    PTT 30.5 24.0 - 36.0 sec   T4, Free    Collection Time: 04/15/20  3:05 PM   Result Value Ref Range    Thyroxine, Free 1.39 0.93 - 1.70 ng/dL   EKG 12 Lead    Collection Time: 04/15/20  3:06 PM   Result Value Ref Range    Ventricular Rate 142 BPM    Atrial Rate 89 BPM    QRS Duration 150 ms    Q-T Interval 344 ms    QTc Calculation (Bazett) 529 ms    R Axis 171 degrees    T Axis 60 degrees   COVID-19    Collection Time: 04/15/20  7:27 PM   Result Value Ref Range    Source . NASOPHARYNGEAL SWAB     SARS-CoV-2 Not Detected Not Detected    Source . NASOPHARYNGEAL SWAB     SARS-CoV-2, PCR         Troponin    Collection Time: 04/15/20  7:30 PM   Result Value Ref Range    Troponin, High Sensitivity 47 (H) 0 - 22 ng/L    Troponin T NOT REPORTED <0.03 ng/mL    Troponin Interp NOT REPORTED    HEPARIN LEVEL/ANTI-XA    Collection Time: 04/15/20 10:41 PM   Result Value Ref Range    Anti-XA Unfrac Heparin 0.16 (L) 0.30 - 0.70 IU/L   HEPARIN LEVEL/ANTI-XA    Collection Time: 04/16/20 5:08 AM   Result Value Ref Range    Anti-XA Unfrac Heparin 0.40 0.30 - 0.70 IU/L   Comprehensive Metabolic Panel w/ Reflex to MG    Collection Time: 04/16/20  5:08 AM   Result Value Ref Range    Glucose 105 (H) 70 - 99 mg/dL    BUN 18 8 - 23 mg/dL    CREATININE 0.97 0.70 - 1.20 mg/dL    Bun/Cre Ratio NOT REPORTED 9 - 20    Calcium 8.1 (L) 8.6 - 10.4 mg/dL    Sodium 141 135 - 144 mmol/L    Potassium 3.6 (L) 3.7 - 5.3 mmol/L    Chloride 106 98 - 107 mmol/L    CO2 24 20 - 31 mmol/L    Anion Gap 11 9 - 17 mmol/L    Alkaline Phosphatase 68 40 - 129 U/L    ALT 17 5 - 41 U/L    AST 22 <40 U/L    Total Bilirubin 1.06 0.3 - 1.2 mg/dL    Total Protein 5.9 (L) 6.4 - 8.3 g/dL    Alb 3.1 (L) 3.5 - 5.2 g/dL    Albumin/Globulin Ratio NOT REPORTED 1.0 - 2.5    GFR Non-African American >60 >60 mL/min    GFR African American >60 >60 mL/min    GFR Comment          GFR Staging NOT REPORTED        Imaging/Diagnostics:        Assessment :      Primary Problem  <principal problem not specified>    Active Hospital Problems    Diagnosis Date Noted    Atrial fibrillation with RVR ([facility] [address] [I48.91] 04/15/2020   Active Problems:    Atrial fibrillation with RVR (HCC)    LBBB (left bundle branch block)    Elevated troponin    Essential hypertension    H/O TIA (transient ischemic attack) and stroke    NSTEMI (non-ST elevated myocardial infarction) (HCC)    Acute systolic congestive heart failure (HCC)    Acquired hypothyroidism  Resolved Problems:    * No resolved hospital problems. *        Plan:     Patient status Admit as inpatient in the  Medical ICU    1. Patient admitted with palpitation, EKG concerning for atrial fibrillation with rapid ventricular rate, patient is on Cardizem infusion  2.  Troponin leak, shortness of breath, new onset left bundle branch block, high clinical suspicion for acute coronary syndrome, patient is on heparin infusion, given 324 of aspirin in the emergency room, patient is on Lipitor 80, will continue with aspirin

## 2020-04-16 NOTE — CONSULTS
Q6H PRN Wilbert Salas MD        Or    acetaminophen (TYLENOL) suppository 650 mg  650 mg Rectal Q6H PRN Luismira Drew MD        polyethylene glycol (GLYCOLAX) packet 17 g  17 g Oral Daily PRN Luis Drew MD        promethazine (PHENERGAN) tablet 12.5 mg  12.5 mg Oral Q6H PRN Wilbert Salas MD        Or    ondansetron (ZOFRAN) injection 4 mg  4 mg Intravenous Q6H PRN Luis Drew MD        albuterol sulfate  (90 Base) MCG/ACT inhaler 2 puff  2 puff Inhalation Q6H PRN Wilbert Salas MD           Social History:       TOBACCO:   reports that he has never smoked. He has never used smokeless tobacco.  ETOH:   has no history on file for alcohol. DRUGS:  has no history on file for drug. OCCUPATION:          Family Histroy:     History reviewed. No pertinent family history. Review of Systems:   Not obtained since the patient has been ruled out for COVID-19 and once he is out and transferred out of the COVID unit then I would examine him as there is no current clinical indication to risk additional necessary exposures.   The patient has already examined by Dr. Christiano Nguyen and the emergency room doctor CDC recommendations have been to limit physical exams to the minimal.     Physical Exam    Vital Signs: BP (!) 164/125   Pulse 99   Temp 97.7 °F (36.5 °C) (Axillary)   Resp 27   Ht 6' 6\" (1.981 m)   Wt 205 lb 4 oz (93.1 kg)   SpO2 96%   BMI 23.72 kg/m²  O2 Flow Rate (L/min): 2 L/min     Admission Weight: 222 lb (100.7 kg)                    Labs:      CBC:   Recent Labs     04/15/20  1505   WBC 9.7   HGB 13.6   HCT 40.2*   MCV 94.0        BMP:   Recent Labs     04/15/20  1505 04/16/20  0508    141   K 4.7 3.6*    106   CO2 21 24   BUN 19 18   CREATININE 1.16 0.97     PT/INR:   Recent Labs     04/15/20  1505   PROTIME 14.6   INR 1.1     APTT:   Recent Labs     04/15/20  1505   APTT 30.5     MAG:   Recent Labs     04/15/20  1505   MG 2.2     D Dimer:   Recent Labs 04/15/20  1505   DDIMER 0.99*     Troponin T   Recent Labs     04/15/20  1505 04/15/20  1930 04/16/20  1022   TROPONINT NOT REPORTED NOT REPORTED NOT REPORTED     ProBNP Invalid input(s): PRO-BNP          Diagnosis:  Active Problems:    Atrial fibrillation with RVR (HCC)    LBBB (left bundle branch block)    Elevated troponin    Essential hypertension    H/O TIA (transient ischemic attack) and stroke    NSTEMI (non-ST elevated myocardial infarction) ([facility] [address]    Acute systolic congestive heart failure (HCC)    Acquired hypothyroidism  Resolved Problems:    * No resolved hospital problems. *          Plan:  New onset atrial fibrillation with rapid response the plan is to control his  rate with beta-blocker his chads vascular score is 4 I would recommend direct oral anticoagulant down the line, obtain  echocardiogram  Acute heart failure most likely due to preserved infection precipitated by atrial fibrillation . Nonspecific troponin elevation due to heart failure and atrial fibrillation with rapid response  Should concern for coronavirus.   Pulmonary service does not think the patient is has corona initial coronavirus strep is negative              electronically signed by Tiny Wagoner DO on 4/16/2020 at 11:11 AM

## 2020-04-17 PROBLEM — E44.1 MILD MALNUTRITION (HCC): Chronic | Status: ACTIVE | Noted: 2020-04-16

## 2020-04-17 LAB
ALBUMIN SERPL-MCNC: 3.3 G/DL (ref 3.5–5.2)
ALBUMIN/GLOBULIN RATIO: ABNORMAL (ref 1–2.5)
ALP BLD-CCNC: 66 U/L (ref 40–129)
ALT SERPL-CCNC: 18 U/L (ref 5–41)
ANION GAP SERPL CALCULATED.3IONS-SCNC: 10 MMOL/L (ref 9–17)
ANTI-XA UNFRAC HEPARIN: 0.39 IU/L (ref 0.3–0.7)
ANTI-XA UNFRAC HEPARIN: 0.41 IU/L (ref 0.3–0.7)
AST SERPL-CCNC: 29 U/L
BILIRUB SERPL-MCNC: 1.2 MG/DL (ref 0.3–1.2)
BUN BLDV-MCNC: 16 MG/DL (ref 8–23)
BUN/CREAT BLD: ABNORMAL (ref 9–20)
CALCIUM SERPL-MCNC: 8.5 MG/DL (ref 8.6–10.4)
CHLORIDE BLD-SCNC: 105 MMOL/L (ref 98–107)
CO2: 28 MMOL/L (ref 20–31)
CREAT SERPL-MCNC: 0.99 MG/DL (ref 0.7–1.2)
GFR AFRICAN AMERICAN: >60 ML/MIN
GFR NON-AFRICAN AMERICAN: >60 ML/MIN
GFR SERPL CREATININE-BSD FRML MDRD: ABNORMAL ML/MIN/{1.73_M2}
GFR SERPL CREATININE-BSD FRML MDRD: ABNORMAL ML/MIN/{1.73_M2}
GLUCOSE BLD-MCNC: 94 MG/DL (ref 70–99)
POTASSIUM SERPL-SCNC: 3.9 MMOL/L (ref 3.7–5.3)
SODIUM BLD-SCNC: 143 MMOL/L (ref 135–144)
TOTAL PROTEIN: 6 G/DL (ref 6.4–8.3)

## 2020-04-17 PROCEDURE — 2580000003 HC RX 258: Performed by: INTERNAL MEDICINE

## 2020-04-17 PROCEDURE — 80053 COMPREHEN METABOLIC PANEL: CPT

## 2020-04-17 PROCEDURE — 6370000000 HC RX 637 (ALT 250 FOR IP): Performed by: NURSE PRACTITIONER

## 2020-04-17 PROCEDURE — 99231 SBSQ HOSP IP/OBS SF/LOW 25: CPT | Performed by: INTERNAL MEDICINE

## 2020-04-17 PROCEDURE — 6370000000 HC RX 637 (ALT 250 FOR IP): Performed by: INTERNAL MEDICINE

## 2020-04-17 PROCEDURE — 2500000003 HC RX 250 WO HCPCS: Performed by: INTERNAL MEDICINE

## 2020-04-17 PROCEDURE — 6360000002 HC RX W HCPCS: Performed by: INTERNAL MEDICINE

## 2020-04-17 PROCEDURE — 2060000000 HC ICU INTERMEDIATE R&B

## 2020-04-17 PROCEDURE — 36415 COLL VENOUS BLD VENIPUNCTURE: CPT

## 2020-04-17 PROCEDURE — 85520 HEPARIN ASSAY: CPT

## 2020-04-17 PROCEDURE — 99233 SBSQ HOSP IP/OBS HIGH 50: CPT | Performed by: INTERNAL MEDICINE

## 2020-04-17 RX ORDER — METOPROLOL SUCCINATE 50 MG/1
50 TABLET, EXTENDED RELEASE ORAL DAILY
Status: DISCONTINUED | OUTPATIENT
Start: 2020-04-17 | End: 2020-04-20 | Stop reason: HOSPADM

## 2020-04-17 RX ORDER — SPIRONOLACTONE 25 MG/1
25 TABLET ORAL DAILY
Status: DISCONTINUED | OUTPATIENT
Start: 2020-04-17 | End: 2020-04-20 | Stop reason: HOSPADM

## 2020-04-17 RX ADMIN — HEPARIN SODIUM 14 UNITS/KG/HR: 10000 INJECTION, SOLUTION INTRAVENOUS at 04:58

## 2020-04-17 RX ADMIN — ASPIRIN 81 MG 81 MG: 81 TABLET ORAL at 08:52

## 2020-04-17 RX ADMIN — BUPROPION HYDROCHLORIDE 150 MG: 150 TABLET, EXTENDED RELEASE ORAL at 08:51

## 2020-04-17 RX ADMIN — DILTIAZEM HYDROCHLORIDE 7.5 MG/HR: 5 INJECTION INTRAVENOUS at 01:09

## 2020-04-17 RX ADMIN — TRAZODONE HYDROCHLORIDE 50 MG: 50 TABLET ORAL at 21:04

## 2020-04-17 RX ADMIN — FLUOXETINE HYDROCHLORIDE 40 MG: 20 CAPSULE ORAL at 08:51

## 2020-04-17 RX ADMIN — POTASSIUM CHLORIDE 40 MEQ: 1500 TABLET, EXTENDED RELEASE ORAL at 21:04

## 2020-04-17 RX ADMIN — ATORVASTATIN CALCIUM 80 MG: 80 TABLET, FILM COATED ORAL at 08:52

## 2020-04-17 RX ADMIN — METOPROLOL SUCCINATE 50 MG: 50 TABLET, EXTENDED RELEASE ORAL at 14:31

## 2020-04-17 RX ADMIN — BUPROPION HYDROCHLORIDE 150 MG: 150 TABLET, EXTENDED RELEASE ORAL at 21:04

## 2020-04-17 RX ADMIN — SACUBITRIL AND VALSARTAN 1 TABLET: 24; 26 TABLET, FILM COATED ORAL at 14:31

## 2020-04-17 RX ADMIN — SPIRONOLACTONE 25 MG: 25 TABLET, FILM COATED ORAL at 14:31

## 2020-04-17 RX ADMIN — LEVOTHYROXINE SODIUM 75 MCG: 75 TABLET ORAL at 05:52

## 2020-04-17 RX ADMIN — APIXABAN 5 MG: 5 TABLET, FILM COATED ORAL at 14:31

## 2020-04-17 RX ADMIN — FUROSEMIDE 40 MG: 10 INJECTION, SOLUTION INTRAMUSCULAR; INTRAVENOUS at 08:51

## 2020-04-17 RX ADMIN — APIXABAN 5 MG: 5 TABLET, FILM COATED ORAL at 21:04

## 2020-04-17 RX ADMIN — Medication 10 ML: at 21:04

## 2020-04-17 RX ADMIN — SACUBITRIL AND VALSARTAN 1 TABLET: 24; 26 TABLET, FILM COATED ORAL at 21:04

## 2020-04-17 RX ADMIN — POTASSIUM CHLORIDE 40 MEQ: 1500 TABLET, EXTENDED RELEASE ORAL at 08:52

## 2020-04-17 ASSESSMENT — PAIN SCALES - GENERAL
PAINLEVEL_OUTOF10: 0

## 2020-04-17 NOTE — CONSULTS
[facility] -   Infectious diseases evaluation  admission date 4/15/2020        Impression :   Current:  · Atrial fibrillation with rapid ventricular response. · Elevated troponin/non-ST elevation MI  · Congestive heart failure  · No evidence of covid 19 infection  · Hypertension  · History of stroke      Recommendations   · The patient on IV Lasix  · No antibiotics at this point            History of Present Illness:   Initial history:  Wade Stapleton is a 82y.o.-year-old male presented to the hospital with palpitation, left-sided chest pain associated with nausea headache and shortness of breath for 1 day prior to admission. He was found to have atrial fibrillation with rapid ventricular response. CT chest showed bilateral pleural effusion, pulmonary edema, no pulmonary embolism  He was started on heparin and Cardizem drip. The patient denied any fever, no cough, no other complaints. COVID-19 PCR was negative. 4/15/2020 procalcitonin level was 0.07, C-reactive protein 66, BNP 5632, , WBC normal, 12% lymphocytes  He is a  at [employer]. Interval changes  4/17/2020       I have personally reviewed the past medical history, past surgical history, medications, social history, and family history, and I haveupdated the database accordingly. Past Medical History:     Past Medical History:   Diagnosis Date    A-fib [facility])        Past Surgical  History:   History reviewed. No pertinent surgical history.     Medications:      [Held by provider] amLODIPine  5 mg Oral Daily    atorvastatin  80 mg Oral Daily    buPROPion  150 mg Oral BID    FLUoxetine  40 mg Oral Daily    levothyroxine  75 mcg Oral Daily    potassium chloride  40 mEq Oral BID    furosemide  40 mg Intravenous Daily    aspirin  81 mg Oral Daily    sodium chloride flush  10 mL Intravenous 2 times per day       Social History:     Social History     Socioeconomic History    Marital have some errors including those of syntax and sound a like substitutions which may escape proof reading. It such instances, actual meaningcan be extrapolated by contextual diversion.     Lorena Leonardo MD  Office: [phone]  Perfect serve / office [phone]

## 2020-04-17 NOTE — PROGRESS NOTES
This RN spoke with patient's wife to update her on patient status and cardiology's POC. All questions answered at this time.

## 2020-04-17 NOTE — PROGRESS NOTES
Temp 98.2 °F (36.8 °C) (Oral)   Resp 18   Ht 6' 6\" (1.981 m)   Wt 220 lb 10.9 oz (100.1 kg)   SpO2 95%   BMI 25.50 kg/m²   Temp (24hrs), Av.9 °F (36.6 °C), Min:97.3 °F (36.3 °C), Max:98.2 °F (36.8 °C)    No results for input(s): POCGLU in the last 72 hours. Intake/Output Summary (Last 24 hours) at 2020 1323  Last data filed at 2020 1135  Gross per 24 hour   Intake 870 ml   Output 1850 ml   Net -980 ml       General Appearance:  alert, well appearing, and in no acute distress  Mental status: oriented to person, place, and time with normal affect  Head:  normocephalic, atraumatic.   Eye: no icterus, redness, pupils equal and reactive, extraocular eye movements intact, conjunctiva clear  Ear: normal external ear, no discharge, hearing intact  Nose:  no drainage noted  Mouth: mucous membranes moist  Neck: supple, no carotid bruits, thyroid not palpable, elevated JVD  Lungs: Air entry bilateral decreased, no rales  Cardiovascular: Irregularly irregular heart rate  Abdomen: Soft, nontender, nondistended, normal bowel sounds, no hepatomegaly or splenomegaly  Neurologic: There are no new focal motor or sensory deficits, normal muscle tone and bulk, no abnormal sensation, normal speech, cranial nerves II through XII grossly intact  Skin: No gross lesions, rashes, bruising or bleeding on exposed skin area  Extremities:  peripheral pulses palpable, no pedal edema or calf pain with palpation  Psych: normal affect     Investigations:      Laboratory Testing:  Recent Results (from the past 24 hour(s))   Troponin    Collection Time: 20  7:09 PM   Result Value Ref Range    Troponin, High Sensitivity 47 (H) 0 - 22 ng/L    Troponin T NOT REPORTED <0.03 ng/mL    Troponin Interp NOT REPORTED    Anti-Xa Unfract Heparin    Collection Time: 20  7:09 PM   Result Value Ref Range    Anti-XA Unfrac Heparin 0.46 0.30 - 0.70 IU/L   HEPARIN LEVEL/ANTI-XA    Collection Time: 20  1:03 AM   Result Value Ref Range    Anti-XA Unfrac Heparin 0.41 0.30 - 0.70 IU/L   Comprehensive Metabolic Panel w/ Reflex to MG    Collection Time: 04/17/20  6:36 AM   Result Value Ref Range    Glucose 94 70 - 99 mg/dL    BUN 16 8 - 23 mg/dL    CREATININE 0.99 0.70 - 1.20 mg/dL    Bun/Cre Ratio NOT REPORTED 9 - 20    Calcium 8.5 (L) 8.6 - 10.4 mg/dL    Sodium 143 135 - 144 mmol/L    Potassium 3.9 3.7 - 5.3 mmol/L    Chloride 105 98 - 107 mmol/L    CO2 28 20 - 31 mmol/L    Anion Gap 10 9 - 17 mmol/L    Alkaline Phosphatase 66 40 - 129 U/L    ALT 18 5 - 41 U/L    AST 29 <40 U/L    Total Bilirubin 1.20 0.3 - 1.2 mg/dL    Total Protein 6.0 (L) 6.4 - 8.3 g/dL    Alb 3.3 (L) 3.5 - 5.2 g/dL    Albumin/Globulin Ratio NOT REPORTED 1.0 - 2.5    GFR Non-African American >60 >60 mL/min    GFR African American >60 >60 mL/min    GFR Comment          GFR Staging NOT REPORTED    HEPARIN LEVEL/ANTI-XA    Collection Time: 04/17/20  6:36 AM   Result Value Ref Range    Anti-XA Unfrac Heparin 0.39 0.30 - 0.70 IU/L       Imaging/Diagnostics:        Assessment :      Primary Problem  <principal problem not specified>    Active Hospital Problems    Diagnosis Date Noted    LBBB (left bundle branch block) [I44.7] 04/16/2020    Elevated troponin [R79.89] 04/16/2020    Essential hypertension [I10] 04/16/2020    H/O TIA (transient ischemic attack) and stroke [Z86.73] 04/16/2020    NSTEMI (non-ST elevated myocardial infarction) ([facility] [address] [I21.4] 18/04/2351    Acute systolic congestive heart failure (HCC) [I50.21] 04/16/2020    Acquired hypothyroidism [E03.9] 04/16/2020    Atrial fibrillation with RVR (HCC) [I48.91] 04/15/2020   Active Problems:    Atrial fibrillation with RVR (HCC)    LBBB (left bundle branch block)    Elevated troponin    Essential hypertension    H/O TIA (transient ischemic attack) and stroke    NSTEMI (non-ST elevated myocardial infarction) (HCC)    Acute systolic congestive heart failure (HCC)    Acquired hypothyroidism  Resolved Problems:    * No resolved hospital problems. *        Plan:     Patient status Admit as inpatient in the  Medical ICU    1. Patient admitted with palpitation, EKG concerning for atrial fibrillation with rapid ventricular rate, patient is on Cardizem infusion  2. Troponin leak, shortness of breath, new onset left bundle branch block, high clinical suspicion for acute coronary syndrome, patient is on heparin infusion, given 324 of aspirin in the emergency room, patient is on Lipitor 80, will continue with aspirin 81 daily, cardiology consulted , monitoring troponins  3. Hypertension, on Cardizem infusion  4. Elevated BNP, patient likely has underlying congestive heart failure, possible ischemic in nature, started on IV Lasix 40 mg , awaiting echocardiogram  5. Depression, restarted home dose of Prozac, trazodone  6. Hypothyroidism, TSH is 7, will not increase Synthroid with possible coronary artery disease and congestive heart failure . 7. Initial concern for coronavirus, although coronavirus strep is negative, patient is taken off from droplet plus isolation, case discussed with pulmonologist.    4/17   Patient doing much better  Heart rate is controlled  Plan to wean Cardizem infusion, start on Lopressor 50 mg  Has EF of 25% on echocardiogram  Patient likely has ischemic cardiomyopathy, with left bundle branch block on EKG  Likely will need cardiac catheterization  On IV Lasix 40, patient is in negative balance  We will move out of intermediate status to progressive unit, once Cardizem drip is off    Consultations:   IP CONSULT TO CARDIOLOGY  IP CONSULT TO PULMONOLOGY  IP CONSULT TO INTERNAL MEDICINE  IP CONSULT TO INFECTIOUS DISEASES     Patient is admitted as inpatient status because of co-morbidities listed above, severity of signs and symptoms as outlined, requirement for current medical therapies and most importantly because of direct risk to patient if care not provided in a hospital setting.     Shante Ornelas,

## 2020-04-18 LAB
ALBUMIN SERPL-MCNC: 3 G/DL (ref 3.5–5.2)
ALBUMIN/GLOBULIN RATIO: ABNORMAL (ref 1–2.5)
ALP BLD-CCNC: 63 U/L (ref 40–129)
ALT SERPL-CCNC: 16 U/L (ref 5–41)
ANION GAP SERPL CALCULATED.3IONS-SCNC: 8 MMOL/L (ref 9–17)
ANTI-XA UNFRAC HEPARIN: >1.1 IU/L (ref 0.3–0.7)
AST SERPL-CCNC: 27 U/L
BILIRUB SERPL-MCNC: 0.79 MG/DL (ref 0.3–1.2)
BUN BLDV-MCNC: 14 MG/DL (ref 8–23)
BUN/CREAT BLD: ABNORMAL (ref 9–20)
CALCIUM SERPL-MCNC: 8.7 MG/DL (ref 8.6–10.4)
CHLORIDE BLD-SCNC: 105 MMOL/L (ref 98–107)
CO2: 25 MMOL/L (ref 20–31)
CREAT SERPL-MCNC: 0.95 MG/DL (ref 0.7–1.2)
GFR AFRICAN AMERICAN: >60 ML/MIN
GFR NON-AFRICAN AMERICAN: >60 ML/MIN
GFR SERPL CREATININE-BSD FRML MDRD: ABNORMAL ML/MIN/{1.73_M2}
GFR SERPL CREATININE-BSD FRML MDRD: ABNORMAL ML/MIN/{1.73_M2}
GLUCOSE BLD-MCNC: 129 MG/DL (ref 70–99)
HCT VFR BLD CALC: 37.5 % (ref 41–53)
HEMOGLOBIN: 12.6 G/DL (ref 13.5–17.5)
MCH RBC QN AUTO: 31.1 PG (ref 26–34)
MCHC RBC AUTO-ENTMCNC: 33.7 G/DL (ref 31–37)
MCV RBC AUTO: 92.3 FL (ref 80–100)
NRBC AUTOMATED: ABNORMAL PER 100 WBC
PDW BLD-RTO: 14.9 % (ref 11.5–14.9)
PLATELET # BLD: 204 K/UL (ref 150–450)
PMV BLD AUTO: 8 FL (ref 6–12)
POTASSIUM SERPL-SCNC: 3.7 MMOL/L (ref 3.7–5.3)
RBC # BLD: 4.06 M/UL (ref 4.5–5.9)
SODIUM BLD-SCNC: 138 MMOL/L (ref 135–144)
TOTAL PROTEIN: 5.8 G/DL (ref 6.4–8.3)
WBC # BLD: 5 K/UL (ref 3.5–11)

## 2020-04-18 PROCEDURE — 36415 COLL VENOUS BLD VENIPUNCTURE: CPT

## 2020-04-18 PROCEDURE — 6370000000 HC RX 637 (ALT 250 FOR IP): Performed by: INTERNAL MEDICINE

## 2020-04-18 PROCEDURE — 80053 COMPREHEN METABOLIC PANEL: CPT

## 2020-04-18 PROCEDURE — 2580000003 HC RX 258: Performed by: INTERNAL MEDICINE

## 2020-04-18 PROCEDURE — 85027 COMPLETE CBC AUTOMATED: CPT

## 2020-04-18 PROCEDURE — 2060000000 HC ICU INTERMEDIATE R&B

## 2020-04-18 PROCEDURE — 85520 HEPARIN ASSAY: CPT

## 2020-04-18 PROCEDURE — 6370000000 HC RX 637 (ALT 250 FOR IP): Performed by: NURSE PRACTITIONER

## 2020-04-18 PROCEDURE — 99231 SBSQ HOSP IP/OBS SF/LOW 25: CPT | Performed by: INTERNAL MEDICINE

## 2020-04-18 PROCEDURE — 6360000002 HC RX W HCPCS: Performed by: INTERNAL MEDICINE

## 2020-04-18 PROCEDURE — 99233 SBSQ HOSP IP/OBS HIGH 50: CPT | Performed by: INTERNAL MEDICINE

## 2020-04-18 RX ORDER — FUROSEMIDE 40 MG/1
40 TABLET ORAL DAILY
Status: DISCONTINUED | OUTPATIENT
Start: 2020-04-19 | End: 2020-04-20

## 2020-04-18 RX ADMIN — Medication 10 ML: at 09:00

## 2020-04-18 RX ADMIN — SACUBITRIL AND VALSARTAN 1 TABLET: 24; 26 TABLET, FILM COATED ORAL at 20:20

## 2020-04-18 RX ADMIN — SACUBITRIL AND VALSARTAN 1 TABLET: 24; 26 TABLET, FILM COATED ORAL at 10:11

## 2020-04-18 RX ADMIN — BUPROPION HYDROCHLORIDE 150 MG: 150 TABLET, EXTENDED RELEASE ORAL at 20:21

## 2020-04-18 RX ADMIN — ATORVASTATIN CALCIUM 80 MG: 80 TABLET, FILM COATED ORAL at 08:57

## 2020-04-18 RX ADMIN — APIXABAN 5 MG: 5 TABLET, FILM COATED ORAL at 20:20

## 2020-04-18 RX ADMIN — APIXABAN 5 MG: 5 TABLET, FILM COATED ORAL at 08:56

## 2020-04-18 RX ADMIN — SPIRONOLACTONE 25 MG: 25 TABLET, FILM COATED ORAL at 08:57

## 2020-04-18 RX ADMIN — FLUOXETINE HYDROCHLORIDE 40 MG: 20 CAPSULE ORAL at 08:56

## 2020-04-18 RX ADMIN — METOPROLOL SUCCINATE 50 MG: 50 TABLET, EXTENDED RELEASE ORAL at 08:55

## 2020-04-18 RX ADMIN — LEVOTHYROXINE SODIUM 75 MCG: 75 TABLET ORAL at 07:10

## 2020-04-18 RX ADMIN — POTASSIUM CHLORIDE 40 MEQ: 1500 TABLET, EXTENDED RELEASE ORAL at 08:55

## 2020-04-18 RX ADMIN — FUROSEMIDE 40 MG: 10 INJECTION, SOLUTION INTRAMUSCULAR; INTRAVENOUS at 08:57

## 2020-04-18 RX ADMIN — POTASSIUM CHLORIDE 40 MEQ: 1500 TABLET, EXTENDED RELEASE ORAL at 20:21

## 2020-04-18 RX ADMIN — ASPIRIN 81 MG 81 MG: 81 TABLET ORAL at 08:57

## 2020-04-18 RX ADMIN — BUPROPION HYDROCHLORIDE 150 MG: 150 TABLET, EXTENDED RELEASE ORAL at 08:55

## 2020-04-18 RX ADMIN — Medication 10 ML: at 22:07

## 2020-04-18 ASSESSMENT — PAIN SCALES - GENERAL: PAINLEVEL_OUTOF10: 0

## 2020-04-18 NOTE — PROGRESS NOTES
18 96 % --   20 0630 -- -- -- -- -- -- 210 lb 8.6 oz (95.5 kg)       Physical Exam  Constitutional:       General: He is not in acute distress. Appearance: Normal appearance. HENT:      Head: Normocephalic and atraumatic. Eyes:      Conjunctiva/sclera: Conjunctivae normal.   Neck:      Musculoskeletal: No neck rigidity. Cardiovascular:      Rate and Rhythm: Normal rate and regular rhythm. Heart sounds: No murmur. Pulmonary:      Effort: Pulmonary effort is normal.      Breath sounds: No rales. Comments: Decreased breath sounds bilaterally  Abdominal:      General: Abdomen is flat. There is no distension. Palpations: Abdomen is soft. Musculoskeletal:      Right lower leg: No edema. Left lower leg: No edema. Skin:     General: Skin is warm. Coloration: Skin is not jaundiced. Neurological:      Mental Status: He is alert and oriented to person, place, and time. Medical Decision Making:   I have independently reviewed/ordered the following labs: Thank you for allowing us to participate in the care of this patient. Please call with questions. This note is created with the assistance of a speech recognition program.  While intending to generate adocument that actually reflects the content of the visit, the document can still have some errors including those of syntax and sound a like substitutions which may escape proof reading. It such instances, actual meaningcan be extrapolated by contextual diversion.     Aurea Henley MD  Office: [phone]  Perfect serve / office [phone]

## 2020-04-18 NOTE — PROGRESS NOTES
daily 1/1/20  Yes Historical Provider, MD   buPROPion [facility] SR) 150 MG extended release tablet Take 150 mg by mouth 2 times daily 2/16/20  Yes Historical Provider, MD   levothyroxine (SYNTHROID) 75 MCG tablet Take 75 mcg by mouth daily 8/23/19  Yes Historical Provider, MD   traZODone (DESYREL) 50 MG tablet Take 25-75 mg by mouth nightly as needed for Sleep 3/24/20  Yes Historical Provider, MD   potassium chloride (KLOR-CON M) 20 MEQ extended release tablet Take 40 mEq by mouth 2 times daily   Yes Historical Provider, MD        Allergies:     Patient has no known allergies. Social History:     Tobacco:    reports that he has never smoked. He has never used smokeless tobacco.  Alcohol:      has no history on file for alcohol. Drug Use:  has no history on file for drug. Family History:     History reviewed. No pertinent family history. Review of Systems:     Positive and Negative as described in HPI.     CONSTITUTIONAL:  negative for fevers, chills, sweats, fatigue, weight loss  HEENT:  negative for vision, hearing changes, runny nose, throat pain  RESPIRATORY: Positive shortness of breath  CARDIOVASCULAR: Positive for palpitation, occasional chest pain mainly on the left side  GASTROINTESTINAL:  negative for nausea, vomiting, diarrhea, constipation, change in bowel habits, abdominal pain   GENITOURINARY:  negative for difficulty of urination, burning with urination, frequency   INTEGUMENT:  negative for rash, skin lesions, easy bruising   HEMATOLOGIC/LYMPHATIC:  negative for swelling/edema   ALLERGIC/IMMUNOLOGIC:  negative for urticaria , itching  ENDOCRINE:  negative increase in drinking, increase in urination, hot or cold intolerance  MUSCULOSKELETAL:  negative joint pains, muscle aches, swelling of joints  NEUROLOGICAL:  negative for headaches, dizziness, lightheadedness, numbness, pain, tingling extremities  BEHAVIOR/PSYCH:  negative for depression, anxiety    Physical Exam:   /79   Pulse 70 Temp 98 °F (36.7 °C) (Oral)   Resp 18   Ht 6' 6\" (1.981 m)   Wt 210 lb 8.6 oz (95.5 kg)   SpO2 96%   BMI 24.33 kg/m²   Temp (24hrs), Av.8 °F (36.6 °C), Min:97.1 °F (36.2 °C), Max:98.3 °F (36.8 °C)    No results for input(s): POCGLU in the last 72 hours. Intake/Output Summary (Last 24 hours) at 2020 1004  Last data filed at 2020 1406  Gross per 24 hour   Intake --   Output 950 ml   Net -950 ml       General Appearance:  alert, well appearing, and in no acute distress  Mental status: oriented to person, place, and time with normal affect  Head:  normocephalic, atraumatic.   Eye: no icterus, redness, pupils equal and reactive, extraocular eye movements intact, conjunctiva clear  Ear: normal external ear, no discharge, hearing intact  Nose:  no drainage noted  Mouth: mucous membranes moist  Neck: supple, no carotid bruits, thyroid not palpable, elevated JVD  Lungs: Air entry bilateral decreased, no rales  Cardiovascular: Irregularly irregular heart rate  Abdomen: Soft, nontender, nondistended, normal bowel sounds, no hepatomegaly or splenomegaly  Neurologic: There are no new focal motor or sensory deficits, normal muscle tone and bulk, no abnormal sensation, normal speech, cranial nerves II through XII grossly intact  Skin: No gross lesions, rashes, bruising or bleeding on exposed skin area  Extremities:  peripheral pulses palpable, no pedal edema or calf pain with palpation  Psych: normal affect     Investigations:      Laboratory Testing:  Recent Results (from the past 24 hour(s))   Comprehensive Metabolic Panel w/ Reflex to MG    Collection Time: 20  6:54 AM   Result Value Ref Range    Glucose 129 (H) 70 - 99 mg/dL    BUN 14 8 - 23 mg/dL    CREATININE 0.95 0.70 - 1.20 mg/dL    Bun/Cre Ratio NOT REPORTED 9 - 20    Calcium 8.7 8.6 - 10.4 mg/dL    Sodium 138 135 - 144 mmol/L    Potassium 3.7 3.7 - 5.3 mmol/L    Chloride 105 98 - 107 mmol/L    CO2 25 20 - 31 mmol/L    Anion Gap 8 (L) 9 - 17 dictation software. Although every effort was made to ensure the accuracy of this automated transcription, some errors in transcription may have occurred.

## 2020-04-18 NOTE — PROGRESS NOTES
BID    [Held by provider] amLODIPine  5 mg Oral Daily    atorvastatin  80 mg Oral Daily    buPROPion  150 mg Oral BID    FLUoxetine  40 mg Oral Daily    levothyroxine  75 mcg Oral Daily    potassium chloride  40 mEq Oral BID    furosemide  40 mg Intravenous Daily    aspirin  81 mg Oral Daily    sodium chloride flush  10 mL Intravenous 2 times per day      dilTIAZem (CARDIZEM) 125 mg in dextrose 5% 125 mL infusion Stopped (04/17/20 1432)     traZODone, albuterol, sodium chloride flush, acetaminophen **OR** acetaminophen, polyethylene glycol, promethazine **OR** ondansetron    LABS   CBC   Recent Labs     04/15/20  1505   WBC 9.7   HGB 13.6   HCT 40.2*   MCV 94.0        BMP:   Lab Results   Component Value Date     04/18/2020    K 3.7 04/18/2020     04/18/2020    CO2 25 04/18/2020    BUN 14 04/18/2020    LABALBU 3.0 04/18/2020    CREATININE 0.95 04/18/2020    CALCIUM 8.7 04/18/2020    GFRAA >60 04/18/2020    LABGLOM >60 04/18/2020     ABGs:No results found for: PHART, PO2ART, IHI6OPT No results found for: IFIO2, MODE, SETTIDVOL, SETPEEP  Ionized Calcium:  No results found for: IONCA  Magnesium:    Lab Results   Component Value Date    MG 2.2 04/15/2020     Phosphorus:  No results found for: PHOS     LIVER PROFILE   Recent Labs     04/15/20  1505  04/18/20  0654   AST 25   < > 27   ALT 18   < > 16   LIPASE 18  --   --    BILITOT 1.25*   < > 0.79   ALKPHOS 82   < > 63    < > = values in this interval not displayed.      INR   Recent Labs     04/15/20  1505   INR 1.1     PTT   Lab Results   Component Value Date    APTT 30.5 04/15/2020         RADIOLOGY     (See actual reports for details)    ASSESSMENT/PLAN     Patient Active Problem List   Diagnosis    Atrial fibrillation with RVR ([facility] [address]    LBBB (left bundle branch block)    Elevated troponin    Essential hypertension    H/O TIA (transient ischemic attack) and stroke    NSTEMI (non-ST elevated myocardial infarction) ([facility] [address]    Acute systolic congestive heart failure (HCC)    Acquired hypothyroidism    Mild malnutrition (HCC)     Congestive heart failure. Ejection fraction 25%  Atrial fibrillation on Eliquis  Bilateral pleural effusions on CT scan likely from congestive heart failure. Currently on IV Lasix. On Entresto  Resting quietly. Sheth continue heart failure medications will decrease the size of the effusions. May need thoracentesis effusions do not improve. For safety reasons Eliquis may need to be held if that is the case.   Electronically signed by Dionisio Diggs MD on 4/18/2020 at 8:35 AM

## 2020-04-18 NOTE — PROGRESS NOTES
furosemide  40 mg Oral Daily    metoprolol succinate  50 mg Oral Daily    apixaban  5 mg Oral BID    spironolactone  25 mg Oral Daily    sacubitril-valsartan  1 tablet Oral BID    atorvastatin  80 mg Oral Daily    buPROPion  150 mg Oral BID    FLUoxetine  40 mg Oral Daily    levothyroxine  75 mcg Oral Daily    potassium chloride  40 mEq Oral BID    aspirin  81 mg Oral Daily    sodium chloride flush  10 mL Intravenous 2 times per day     Continuous Infusions:     Echo:      ASSESSMENT     Active Problems:    Atrial fibrillation with RVR ([facility])    LBBB (left bundle branch block)    Elevated troponin    Essential hypertension    H/O TIA (transient ischemic attack) and stroke    NSTEMI (non-ST elevated myocardial infarction) (HCC)    Acute systolic congestive heart failure (HCC)    Acquired hypothyroidism    Mild malnutrition (formerly [facility])  Resolved Problems:    * No resolved hospital problems. *      PLAN     Acute chf with reduced ejection fraction due to atrial fibrillation with rvr his symptoms are improving. New onset atrial fibrillation with rvr he reverted back to sinus rhythm this am.he needs to continue with eliquis indefintely. 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     Electronically signed by Lucie Murillo DO on 4/18/2020 at 11:20 AM

## 2020-04-18 NOTE — CARE COORDINATION
ONGOING DISCHARGE PLAN:    Spoke with patient regarding discharge plan and patient confirms that plan is still to return to home w/ Spouse, w/ no needs. PT is COVID Neg. On RA 96%, Afebrile    Pt. Denies VNS. Eliquid OOP $47, this is affordable & pt. Was given a 30 day Free Card to use. Pt on Oral Lasix. ?DC today. Will continue to follow for additional discharge needs.     Electronically signed by Gina Sy RN on 4/18/2020 at 10:38 AM

## 2020-04-19 LAB
ALBUMIN SERPL-MCNC: 3.2 G/DL (ref 3.5–5.2)
ALBUMIN/GLOBULIN RATIO: ABNORMAL (ref 1–2.5)
ALP BLD-CCNC: 64 U/L (ref 40–129)
ALT SERPL-CCNC: 29 U/L (ref 5–41)
ANION GAP SERPL CALCULATED.3IONS-SCNC: 9 MMOL/L (ref 9–17)
AST SERPL-CCNC: 46 U/L
BILIRUB SERPL-MCNC: 0.7 MG/DL (ref 0.3–1.2)
BUN BLDV-MCNC: 14 MG/DL (ref 8–23)
BUN/CREAT BLD: ABNORMAL (ref 9–20)
CALCIUM SERPL-MCNC: 8.9 MG/DL (ref 8.6–10.4)
CHLORIDE BLD-SCNC: 104 MMOL/L (ref 98–107)
CO2: 28 MMOL/L (ref 20–31)
CREAT SERPL-MCNC: 0.97 MG/DL (ref 0.7–1.2)
GFR AFRICAN AMERICAN: >60 ML/MIN
GFR NON-AFRICAN AMERICAN: >60 ML/MIN
GFR SERPL CREATININE-BSD FRML MDRD: ABNORMAL ML/MIN/{1.73_M2}
GFR SERPL CREATININE-BSD FRML MDRD: ABNORMAL ML/MIN/{1.73_M2}
GLUCOSE BLD-MCNC: 96 MG/DL (ref 70–99)
POTASSIUM SERPL-SCNC: 4.7 MMOL/L (ref 3.7–5.3)
SODIUM BLD-SCNC: 141 MMOL/L (ref 135–144)
TOTAL PROTEIN: 6 G/DL (ref 6.4–8.3)

## 2020-04-19 PROCEDURE — 80053 COMPREHEN METABOLIC PANEL: CPT

## 2020-04-19 PROCEDURE — 6370000000 HC RX 637 (ALT 250 FOR IP): Performed by: INTERNAL MEDICINE

## 2020-04-19 PROCEDURE — 2060000000 HC ICU INTERMEDIATE R&B

## 2020-04-19 PROCEDURE — 36415 COLL VENOUS BLD VENIPUNCTURE: CPT

## 2020-04-19 PROCEDURE — 99231 SBSQ HOSP IP/OBS SF/LOW 25: CPT | Performed by: INTERNAL MEDICINE

## 2020-04-19 PROCEDURE — 6370000000 HC RX 637 (ALT 250 FOR IP): Performed by: NURSE PRACTITIONER

## 2020-04-19 PROCEDURE — 2580000003 HC RX 258: Performed by: INTERNAL MEDICINE

## 2020-04-19 PROCEDURE — 99233 SBSQ HOSP IP/OBS HIGH 50: CPT | Performed by: INTERNAL MEDICINE

## 2020-04-19 RX ADMIN — APIXABAN 5 MG: 5 TABLET, FILM COATED ORAL at 20:54

## 2020-04-19 RX ADMIN — Medication 10 ML: at 08:48

## 2020-04-19 RX ADMIN — APIXABAN 5 MG: 5 TABLET, FILM COATED ORAL at 08:45

## 2020-04-19 RX ADMIN — Medication 10 ML: at 21:01

## 2020-04-19 RX ADMIN — POTASSIUM CHLORIDE 40 MEQ: 1500 TABLET, EXTENDED RELEASE ORAL at 08:44

## 2020-04-19 RX ADMIN — METOPROLOL SUCCINATE 50 MG: 50 TABLET, EXTENDED RELEASE ORAL at 08:45

## 2020-04-19 RX ADMIN — SPIRONOLACTONE 25 MG: 25 TABLET, FILM COATED ORAL at 08:45

## 2020-04-19 RX ADMIN — SACUBITRIL AND VALSARTAN 1 TABLET: 24; 26 TABLET, FILM COATED ORAL at 21:01

## 2020-04-19 RX ADMIN — POTASSIUM CHLORIDE 40 MEQ: 1500 TABLET, EXTENDED RELEASE ORAL at 20:54

## 2020-04-19 RX ADMIN — ASPIRIN 81 MG 81 MG: 81 TABLET ORAL at 08:44

## 2020-04-19 RX ADMIN — ATORVASTATIN CALCIUM 80 MG: 80 TABLET, FILM COATED ORAL at 08:44

## 2020-04-19 RX ADMIN — BUPROPION HYDROCHLORIDE 150 MG: 150 TABLET, EXTENDED RELEASE ORAL at 08:44

## 2020-04-19 RX ADMIN — TRAZODONE HYDROCHLORIDE 50 MG: 50 TABLET ORAL at 20:54

## 2020-04-19 RX ADMIN — FLUOXETINE HYDROCHLORIDE 40 MG: 20 CAPSULE ORAL at 08:44

## 2020-04-19 RX ADMIN — SACUBITRIL AND VALSARTAN 1 TABLET: 24; 26 TABLET, FILM COATED ORAL at 10:01

## 2020-04-19 RX ADMIN — FUROSEMIDE 40 MG: 40 TABLET ORAL at 08:45

## 2020-04-19 RX ADMIN — BUPROPION HYDROCHLORIDE 150 MG: 150 TABLET, EXTENDED RELEASE ORAL at 20:54

## 2020-04-19 ASSESSMENT — PAIN SCALES - GENERAL
PAINLEVEL_OUTOF10: 0
PAINLEVEL_OUTOF10: 0

## 2020-04-19 NOTE — PROGRESS NOTES
Patient condition discussed at length with son Simone Hutchison from RN and Dr. Chuckie Barlow. Simone Hutchison is very concerned about patient getting help at home as he is sole caretaker of frail wife at home. Simone Hutchison was given [facility] number for assistance. RN explained to Simone Hutchison that since his father is alert and oriented, he can make his own decisions and we cannot force help at home but would recommend it to him and explain benefits. Patient's son lives in [place] and feel very helpless.  Electronically signed by Fiordaliza Eller RN on 4/19/2020 at 10:10 AM

## 2020-04-19 NOTE — PROGRESS NOTES
[facility] [address]    HISTORY AND PHYSICAL EXAMINATION            Date:   4/19/2020  Patient name:  Mihir Fitzpatrick  Date of admission:  4/15/2020  3:05 PM  MRN:   [id number]  Account:  [de-identified]  YOB: 1935  PCP:    Rock Pederson MD  Room:   2112/2112-01  Code Status:    Full Code    Chief Complaint:     Chief Complaint   Patient presents with    Chest Pain    Shortness of Breath       History Obtained From:     patient, electronic medical record    History of Present Illness: The patient is a 82 y.o. Non-/non  male who presents with Chest Pain and Shortness of Breath   and he is admitted to the hospital for the management of palpitation, shortness of breath, left-sided chest pain. Symptoms started 2 days back. Patient has history of hypertension, hypothyroidism, TIA in the past,  He mentioned that he noticed palpitation, shortness of breath, symptoms going on for last 2 days. Associated with chest discomfort, symptoms progressively getting worse that make him come to the hospital.  In the emergency room, patient found to have atrial fibrillation, he was also found to have left bundle branch block. Which is reported is new. Patient started on heparin infusion, Cardizem drip  Patient also had elevated BNP, CT chest was done, concerning for bilateral pleural effusion. 4/19   Patient biotics changed to p.o. Is still short of breath, walking, and back from bathroom became short winded      Past Medical History:     Past Medical History:   Diagnosis Date    A-fib ([facility] [address]     CVA (cerebral vascular accident) ([facility] [address]     Myocardial infarct, old         Past Surgical History:     History reviewed. No pertinent surgical history. Medications Prior to Admission:     Prior to Admission medications    Medication Sig Start Date End Date Taking?  Authorizing Provider   amLODIPine (NORVASC) 5 MG tablet Take 5 mg by mouth daily 10/17/19 Yes Historical Provider, MD   FLUoxetine (PROZAC) 20 MG capsule Take 40 mg by mouth daily  3/24/20  Yes Historical Provider, MD   atorvastatin (LIPITOR) 80 MG tablet Take 80 mg by mouth daily 1/1/20  Yes Historical Provider, MD   buPROPion (WELLBUTRIN SR) 150 MG extended release tablet Take 150 mg by mouth 2 times daily 2/16/20  Yes Historical Provider, MD   levothyroxine (SYNTHROID) 75 MCG tablet Take 75 mcg by mouth daily 8/23/19  Yes Historical Provider, MD   traZODone (DESYREL) 50 MG tablet Take 25-75 mg by mouth nightly as needed for Sleep 3/24/20  Yes Historical Provider, MD   potassium chloride (KLOR-CON M) 20 MEQ extended release tablet Take 40 mEq by mouth 2 times daily   Yes Historical Provider, MD        Allergies:     Patient has no known allergies. Social History:     Tobacco:    reports that he has never smoked. He has never used smokeless tobacco.  Alcohol:      has no history on file for alcohol. Drug Use:  has no history on file for drug. Family History:     History reviewed. No pertinent family history. Review of Systems:     Positive and Negative as described in HPI.     CONSTITUTIONAL:  negative for fevers, chills, sweats, fatigue, weight loss  HEENT:  negative for vision, hearing changes, runny nose, throat pain  RESPIRATORY: Positive shortness of breath  CARDIOVASCULAR: Positive for palpitation, occasional chest pain mainly on the left side  GASTROINTESTINAL:  negative for nausea, vomiting, diarrhea, constipation, change in bowel habits, abdominal pain   GENITOURINARY:  negative for difficulty of urination, burning with urination, frequency   INTEGUMENT:  negative for rash, skin lesions, easy bruising   HEMATOLOGIC/LYMPHATIC:  negative for swelling/edema   ALLERGIC/IMMUNOLOGIC:  negative for urticaria , itching  ENDOCRINE:  negative increase in drinking, increase in urination, hot or cold intolerance  MUSCULOSKELETAL:  negative joint pains, muscle aches, swelling of joints  NEUROLOGICAL:  negative for headaches, dizziness, lightheadedness, numbness, pain, tingling extremities  BEHAVIOR/PSYCH:  negative for depression, anxiety    Physical Exam:   BP (!) 142/98   Pulse 78   Temp 97.1 °F (36.2 °C) (Oral)   Resp 18   Ht 6' 6\" (1.981 m)   Wt 210 lb 8.6 oz (95.5 kg)   SpO2 93%   BMI 24.33 kg/m²   Temp (24hrs), Av.5 °F (36.4 °C), Min:97 °F (36.1 °C), Max:97.9 °F (36.6 °C)    No results for input(s): POCGLU in the last 72 hours. Intake/Output Summary (Last 24 hours) at 2020 1001  Last data filed at 2020 1721  Gross per 24 hour   Intake 480 ml   Output --   Net 480 ml       General Appearance:  alert, well appearing, and in no acute distress  Mental status: oriented to person, place, and time with normal affect  Head:  normocephalic, atraumatic.   Eye: no icterus, redness, pupils equal and reactive, extraocular eye movements intact, conjunctiva clear  Ear: normal external ear, no discharge, hearing intact  Nose:  no drainage noted  Mouth: mucous membranes moist  Neck: supple, no carotid bruits, thyroid not palpable, elevated JVD  Lungs: Air entry bilateral decreased, no rales  Cardiovascular: Irregularly irregular heart rate  Abdomen: Soft, nontender, nondistended, normal bowel sounds, no hepatomegaly or splenomegaly  Neurologic: There are no new focal motor or sensory deficits, normal muscle tone and bulk, no abnormal sensation, normal speech, cranial nerves II through XII grossly intact  Skin: No gross lesions, rashes, bruising or bleeding on exposed skin area  Extremities:  peripheral pulses palpable, no pedal edema or calf pain with palpation  Psych: normal affect     Investigations:      Laboratory Testing:  Recent Results (from the past 24 hour(s))   CBC    Collection Time: 20  4:18 PM   Result Value Ref Range    WBC 5.0 3.5 - 11.0 k/uL    RBC 4.06 (L) 4.5 - 5.9 m/uL    Hemoglobin 12.6 (L) 13.5 - 17.5 g/dL    Hematocrit 37.5 (L) 41 - 53 %    MCV 92.3

## 2020-04-19 NOTE — PROGRESS NOTES
Pulmonary Progress Note  Pulmonary and Critical Care Specialists      Patient - Frankie Allen,  Age - 82 y.o.    - 1935      Room Number - 2112/2112-01   MRN -  [id number]   [facility]  Date of Admission -  4/15/2020  3:05 PM    Cortez Lopez MD  Primary Care Physician - Eddie Gaxiola MD     SUBJECTIVE   Patient appears to be in decent spirits. He stated that he urinated so much fluid \" to float the [place]\" yesterday! He only received p.o. Lasix and not IV Lasix yesterday    Unfortunate, because of his incontinence, accurate I's and O's are a challenge    OBJECTIVE   VITALS    height is 6' 6\" (1.981 m) and weight is 210 lb 8.6 oz (95.5 kg). His oral temperature is 97.1 °F (36.2 °C). His blood pressure is 142/98 (abnormal) and his pulse is 78. His respiration is 18 and oxygen saturation is 93%. Body mass index is 24.33 kg/m². Temperature Range: Temp: 97.1 °F (36.2 °C) Temp  Av.5 °F (36.4 °C)  Min: 97 °F (36.1 °C)  Max: 97.9 °F (36.6 °C)  BP Range:  Systolic (41BAM), MTT:273 , Min:116 , LVQ:332     Diastolic (60SRV), COH:52, Min:65, Max:98    Pulse Range: Pulse  Av.3  Min: 72  Max: 78  Respiration Range: Resp  Av.5  Min: 16  Max: 18  Current Pulse Ox[de-identified]  SpO2: 93 %  24HR Pulse Ox Range:  SpO2  Av.3 %  Min: 92 %  Max: 97 %  Oxygen Amount and Delivery: O2 Flow Rate (L/min): 1 L/min    Wt Readings from Last 3 Encounters:   20 210 lb 8.6 oz (95.5 kg)       I/O (24 Hours)    Intake/Output Summary (Last 24 hours) at 2020 0948  Last data filed at 2020 1721  Gross per 24 hour   Intake 480 ml   Output --   Net 480 ml       EXAM     General Appearance  Awake, alert, oriented, in no acute distress  HEENT - normocephalic, atraumatic. Neck - Supple,  trachea midline   Lungs -some crackles at the bases. Posteriorly  Heart Exam:PMI normal. No lifts, heaves, or thrills. RRR.  No murmurs, clicks, gallops, or rubs  Abdomen Exam: Abdomen soft, non-tender. Extremity Exam: No edema    MEDS      furosemide  40 mg Oral Daily    metoprolol succinate  50 mg Oral Daily    apixaban  5 mg Oral BID    spironolactone  25 mg Oral Daily    sacubitril-valsartan  1 tablet Oral BID    atorvastatin  80 mg Oral Daily    buPROPion  150 mg Oral BID    FLUoxetine  40 mg Oral Daily    levothyroxine  75 mcg Oral Daily    potassium chloride  40 mEq Oral BID    aspirin  81 mg Oral Daily    sodium chloride flush  10 mL Intravenous 2 times per day       traZODone, albuterol, sodium chloride flush, acetaminophen **OR** acetaminophen, polyethylene glycol, promethazine **OR** ondansetron    LABS   CBC   Recent Labs     04/18/20  1618   WBC 5.0   HGB 12.6*   HCT 37.5*   MCV 92.3        BMP:   Lab Results   Component Value Date     04/19/2020    K 4.7 04/19/2020     04/19/2020    CO2 28 04/19/2020    BUN 14 04/19/2020    LABALBU 3.2 04/19/2020    CREATININE 0.97 04/19/2020    CALCIUM 8.9 04/19/2020    GFRAA >60 04/19/2020    LABGLOM >60 04/19/2020     ABGs:No results found for: PHART, PO2ART, BCU0LUE No results found for: IFIO2, MODE, SETTIDVOL, SETPEEP  Ionized Calcium:  No results found for: IONCA  Magnesium:    Lab Results   Component Value Date    MG 2.2 04/15/2020     Phosphorus:  No results found for: PHOS     LIVER PROFILE   Recent Labs     04/19/20  0524   AST 46*   ALT 29   BILITOT 0.70   ALKPHOS 64     INR No results for input(s): INR in the last 72 hours.   PTT   Lab Results   Component Value Date    APTT 30.5 04/15/2020         RADIOLOGY     (See actual reports for details)    ASSESSMENT/PLAN     Patient Active Problem List   Diagnosis    Atrial fibrillation with RVR ([facility]    LBBB (left bundle branch block)    Elevated troponin    Essential hypertension    H/O TIA (transient ischemic attack) and stroke    NSTEMI (non-ST elevated myocardial infarction) ([facility]    [facility]    Acquired

## 2020-04-19 NOTE — PLAN OF CARE
Problem: Falls - Risk of:  Goal: Will remain free from falls  Description: Will remain free from falls  Outcome: Met This Shift  Note: Pt able to remain free from falls or injury during shift. Pt able to ambulate safely with stand by assist when needed. Goal: Absence of physical injury  Description: Absence of physical injury  Outcome: Met This Shift     Problem: Nutrition  Goal: Optimal nutrition therapy  Outcome: Met This Shift  Note: Pt showing adequate nutrition at this time, pt is consuming average of 50% of each meal.      Problem: Infection:  Goal: Will remain free from infection  Description: Will remain free from infection  Outcome: Met This Shift  Note: Pt is not exhibiting any new signs or symptoms of infection. Pt being monitored for any changes r/t infection. Problem: Safety:  Goal: Free from accidental physical injury  Description: Free from accidental physical injury  Outcome: Met This Shift  Note: Pt has been free from accidental or intentional injury during shift. Pt was reminded of safety measures to prevent accidents such as a stand by assist,  socks, and educated about ambulating slowly to avoid. Goal: Free from intentional harm  Description: Free from intentional harm  Outcome: Met This Shift     Problem: Daily Care:  Goal: Daily care needs are met  Description: Daily care needs are met  Outcome: Met This Shift     Problem: Pain:  Goal: Patient's pain/discomfort is manageable  Description: Patient's pain/discomfort is manageable  Outcome: Met This Shift     Problem: Skin Integrity:  Goal: Skin integrity will stabilize  Description: Skin integrity will stabilize  Outcome: Met This Shift     Problem: Cardiac:  Goal: Ability to maintain vital signs within normal range will improve  Description: Ability to maintain vital signs within normal range will improve  Outcome: Ongoing  Note: Pt showing vital signs WNL. Pt will continue to be monitored during hospitalization.    Goal: Cardiovascular alteration will improve  Description: Cardiovascular alteration will improve  Outcome: Ongoing  Note: Pt showing improvement in cardiac rhythm. Pt is back to NSR but will continue to be monitored d/t impaired cardiac function.

## 2020-04-19 NOTE — PROGRESS NOTES
aspirin  81 mg Oral Daily    sodium chloride flush  10 mL Intravenous 2 times per day       Social History:     Social History     Socioeconomic History    Marital status:      Spouse name: Not on file    Number of children: Not on file    Years of education: Not on file    Highest education level: Not on file   Occupational History    Not on file   Social Needs    Financial resource strain: Not on file    Food insecurity     Worry: Not on file     Inability: Not on file    Transportation needs     Medical: Not on file     Non-medical: Not on file   Tobacco Use    Smoking status: Never Smoker    Smokeless tobacco: Never Used   Substance and Sexual Activity    Alcohol use: Not on file    Drug use: Not on file    Sexual activity: Not on file   Lifestyle    Physical activity     Days per week: Not on file     Minutes per session: Not on file    Stress: Not on file   Relationships    Social connections     Talks on phone: Not on file     Gets together: Not on file     Attends Buddhist service: Not on file     Active member of club or organization: Not on file     Attends meetings of clubs or organizations: Not on file     Relationship status: Not on file    Intimate partner violence     Fear of current or ex partner: Not on file     Emotionally abused: Not on file     Physically abused: Not on file     Forced sexual activity: Not on file   Other Topics Concern    Not on file   Social History Narrative    Not on file       Family History:   History reviewed. No pertinent family history. Allergies:   Patient has no known allergies.      Review of Systems:     Review of Systems  As per history recent illness other than above 14 systems reviewed were negative  Physical Examination :     Patient Vitals for the past 8 hrs:   BP Temp Temp src Pulse Resp SpO2   04/19/20 1324 (!) 117/93 97.4 °F (36.3 °C) Oral 72 16 97 %   04/19/20 0817 (!) 142/98 97.1 °F (36.2 °C) Oral 78 18 93 %       Physical

## 2020-04-20 ENCOUNTER — APPOINTMENT (OUTPATIENT)
Dept: GENERAL RADIOLOGY | Age: 85
DRG: 280 | End: 2020-04-20
Payer: MEDICARE

## 2020-04-20 VITALS
WEIGHT: 209.88 LBS | RESPIRATION RATE: 18 BRPM | HEART RATE: 69 BPM | DIASTOLIC BLOOD PRESSURE: 88 MMHG | HEIGHT: 78 IN | SYSTOLIC BLOOD PRESSURE: 129 MMHG | OXYGEN SATURATION: 93 % | TEMPERATURE: 97.3 F | BODY MASS INDEX: 24.28 KG/M2

## 2020-04-20 LAB
ALBUMIN SERPL-MCNC: 3.2 G/DL (ref 3.5–5.2)
ALBUMIN/GLOBULIN RATIO: ABNORMAL (ref 1–2.5)
ALP BLD-CCNC: 67 U/L (ref 40–129)
ALT SERPL-CCNC: 64 U/L (ref 5–41)
ANION GAP SERPL CALCULATED.3IONS-SCNC: 8 MMOL/L (ref 9–17)
AST SERPL-CCNC: 73 U/L
BILIRUB SERPL-MCNC: 0.86 MG/DL (ref 0.3–1.2)
BUN BLDV-MCNC: 17 MG/DL (ref 8–23)
BUN/CREAT BLD: ABNORMAL (ref 9–20)
CALCIUM SERPL-MCNC: 8.9 MG/DL (ref 8.6–10.4)
CHLORIDE BLD-SCNC: 105 MMOL/L (ref 98–107)
CO2: 29 MMOL/L (ref 20–31)
CREAT SERPL-MCNC: 1.17 MG/DL (ref 0.7–1.2)
GFR AFRICAN AMERICAN: >60 ML/MIN
GFR NON-AFRICAN AMERICAN: 60 ML/MIN
GFR SERPL CREATININE-BSD FRML MDRD: ABNORMAL ML/MIN/{1.73_M2}
GFR SERPL CREATININE-BSD FRML MDRD: ABNORMAL ML/MIN/{1.73_M2}
GLUCOSE BLD-MCNC: 90 MG/DL (ref 70–99)
POTASSIUM SERPL-SCNC: 4.2 MMOL/L (ref 3.7–5.3)
SODIUM BLD-SCNC: 142 MMOL/L (ref 135–144)
TOTAL PROTEIN: 6 G/DL (ref 6.4–8.3)

## 2020-04-20 PROCEDURE — 2580000003 HC RX 258: Performed by: INTERNAL MEDICINE

## 2020-04-20 PROCEDURE — 36415 COLL VENOUS BLD VENIPUNCTURE: CPT

## 2020-04-20 PROCEDURE — 99239 HOSP IP/OBS DSCHRG MGMT >30: CPT | Performed by: INTERNAL MEDICINE

## 2020-04-20 PROCEDURE — 71046 X-RAY EXAM CHEST 2 VIEWS: CPT

## 2020-04-20 PROCEDURE — 6370000000 HC RX 637 (ALT 250 FOR IP): Performed by: INTERNAL MEDICINE

## 2020-04-20 PROCEDURE — 80053 COMPREHEN METABOLIC PANEL: CPT

## 2020-04-20 PROCEDURE — 6370000000 HC RX 637 (ALT 250 FOR IP): Performed by: NURSE PRACTITIONER

## 2020-04-20 RX ORDER — METOPROLOL SUCCINATE 50 MG/1
50 TABLET, EXTENDED RELEASE ORAL DAILY
Qty: 30 TABLET | Refills: 3 | Status: ON HOLD
Start: 2020-04-21 | End: 2020-04-28 | Stop reason: HOSPADM

## 2020-04-20 RX ORDER — FUROSEMIDE 40 MG/1
40 TABLET ORAL 2 TIMES DAILY
Qty: 60 TABLET | Refills: 3 | Status: ON HOLD
Start: 2020-04-20 | End: 2020-10-23 | Stop reason: HOSPADM

## 2020-04-20 RX ORDER — ASPIRIN 81 MG/1
81 TABLET, CHEWABLE ORAL DAILY
Qty: 30 TABLET | Refills: 3 | Status: SHIPPED | OUTPATIENT
Start: 2020-04-21 | End: 2022-01-24

## 2020-04-20 RX ORDER — SPIRONOLACTONE 25 MG/1
25 TABLET ORAL DAILY
Qty: 30 TABLET | Refills: 3 | Status: ON HOLD
Start: 2020-04-21 | End: 2020-10-23 | Stop reason: HOSPADM

## 2020-04-20 RX ORDER — FUROSEMIDE 40 MG/1
40 TABLET ORAL 2 TIMES DAILY
Status: DISCONTINUED | OUTPATIENT
Start: 2020-04-20 | End: 2020-04-20 | Stop reason: HOSPADM

## 2020-04-20 RX ADMIN — APIXABAN 5 MG: 5 TABLET, FILM COATED ORAL at 08:54

## 2020-04-20 RX ADMIN — SPIRONOLACTONE 25 MG: 25 TABLET, FILM COATED ORAL at 08:54

## 2020-04-20 RX ADMIN — LEVOTHYROXINE SODIUM 75 MCG: 75 TABLET ORAL at 06:18

## 2020-04-20 RX ADMIN — POTASSIUM CHLORIDE 40 MEQ: 1500 TABLET, EXTENDED RELEASE ORAL at 08:53

## 2020-04-20 RX ADMIN — FUROSEMIDE 40 MG: 40 TABLET ORAL at 08:54

## 2020-04-20 RX ADMIN — ATORVASTATIN CALCIUM 80 MG: 80 TABLET, FILM COATED ORAL at 08:54

## 2020-04-20 RX ADMIN — BUPROPION HYDROCHLORIDE 150 MG: 150 TABLET, EXTENDED RELEASE ORAL at 08:53

## 2020-04-20 RX ADMIN — METOPROLOL SUCCINATE 50 MG: 50 TABLET, EXTENDED RELEASE ORAL at 08:54

## 2020-04-20 RX ADMIN — Medication 10 ML: at 08:55

## 2020-04-20 RX ADMIN — SACUBITRIL AND VALSARTAN 1 TABLET: 24; 26 TABLET, FILM COATED ORAL at 08:57

## 2020-04-20 RX ADMIN — FLUOXETINE HYDROCHLORIDE 40 MG: 20 CAPSULE ORAL at 08:53

## 2020-04-20 RX ADMIN — ASPIRIN 81 MG 81 MG: 81 TABLET ORAL at 08:54

## 2020-04-20 NOTE — PROGRESS NOTES
[facility] PHYSICIANS CARDIOLOGY Progress Note    2020 11:43 AM      Subjective:  Mr. Salome Eugenes of exertional shortness of breath with minimal activity such as going to bathroom and back and denies any chest pain or palpitations or lightheadedness or dizziness. Review of systems:  No fever or chills. No cough. No diarrhea. No headaches. I am seeing for the 1st time ever. Reviewed prior cardiology entries as charted. LABS:     Recent Results (from the past 24 hour(s))   Comprehensive Metabolic Panel w/ Reflex to MG    Collection Time: 20  5:05 AM   Result Value Ref Range    Glucose 90 70 - 99 mg/dL    BUN 17 8 - 23 mg/dL    CREATININE 1.17 0.70 - 1.20 mg/dL    Bun/Cre Ratio NOT REPORTED 9 - 20    Calcium 8.9 8.6 - 10.4 mg/dL    Sodium 142 135 - 144 mmol/L    Potassium 4.2 3.7 - 5.3 mmol/L    Chloride 105 98 - 107 mmol/L    CO2 29 20 - 31 mmol/L    Anion Gap 8 (L) 9 - 17 mmol/L    Alkaline Phosphatase 67 40 - 129 U/L    ALT 64 (H) 5 - 41 U/L    AST 73 (H) <40 U/L    Total Bilirubin 0.86 0.3 - 1.2 mg/dL    Total Protein 6.0 (L) 6.4 - 8.3 g/dL    Alb 3.2 (L) 3.5 - 5.2 g/dL    Albumin/Globulin Ratio NOT REPORTED 1.0 - 2.5    GFR Non-African American 60 (L) >60 mL/min    GFR African American >60 >60 mL/min    GFR Comment          GFR Staging NOT REPORTED        Pulse Ox:  SpO2  Av.8 %  Min: 93 %  Max: 97 %    Supplemental O2: O2 Flow Rate (L/min): 1 L/min     Current Meds:    furosemide  40 mg Oral BID    metoprolol succinate  50 mg Oral Daily    apixaban  5 mg Oral BID    spironolactone  25 mg Oral Daily    sacubitril-valsartan  1 tablet Oral BID    atorvastatin  80 mg Oral Daily    buPROPion  150 mg Oral BID    FLUoxetine  40 mg Oral Daily    levothyroxine  75 mcg Oral Daily    potassium chloride  40 mEq Oral BID    aspirin  81 mg Oral Daily    sodium chloride flush  10 mL Intravenous 2 times per day            VITAL SIGNS:    /88   Pulse 69   Temp 97.3 °F (36.3 °C) (Oral)   Resp 18   Ht 6' 6\" (1.981 m)   Wt 209 lb 14.1 oz (95.2 kg)   SpO2 93%   BMI 24.25 kg/m²  1 L/min      Admit Weight:  222 lb (100.7 kg)    Last 3 weights: Wt Readings from Last 3 Encounters:   04/20/20 209 lb 14.1 oz (95.2 kg)       BMI: Body mass index is 24.25 kg/m². INPUT/OUTPUT:      No intake or output data in the 24 hours ending 04/20/20 1143      Telemetry shows Sinus rhythm. EXAM:     General appearance: awake, alert. Pleasant. Neck: No JVD or thyromegaly  Chest:  Diminished air entry at bases bilaterally. No tenderness. No rhonchi or wheezing. Cardiac: Regular rate and rhythm. No definite S3 gallop or rubs. Abdomen: soft, non-tender. Extremities: no cyanosis, no clubbing, no calf tenderness, no leg edema. Pulses: intact bilateral radial pulses. Skin:  warm and dry. Neuro:  Able to move all 4 extremities. 2D echocardiogram April 15, 2020:    Contrast was utilized on this technically difficult study. Left ventricle is normal in size. Mild left ventricular hypertrophy. Difficult assessment of left ventricular function due to irregular rhythm. LV systolic function appears severely reduced. Estimated LV EF 25%. Both atria are normal in size. Right ventricle was not well visualized. Mild mitral regurgitation. Mild tricuspid regurgitation. Mild pulmonary hypertension. Estimated right ventricular systolic pressure  is [id number]. ASSESSMENT:    Newly documented atrial fibrillation this admission - sinus rhythm currently. Acute systolic heart failure with reduced LV ejection fraction-newly documented severe left ventricular systolic dysfunction with LVEF 25% on abnormal 2D echocardiogram April 15, 2020. Question due to tachycardia induced cardiomyopathy given newly diagnosed atrial fibrillation with rapid ventricular rate initially during current hospitalization and currently in sinus rhythm.       Bilateral pleural effusions and possible thoracentesis time of my evaluation. Thank you. Electronically signed by Ronny Pacheco MD, [facility]        PLEASE NOTE:  This progress note was completed using a voice transcription system. Every effort was made to ensure accuracy. However, inadvertent computerized transcription errors may be present.

## 2020-04-20 NOTE — PROGRESS NOTES
Pulmonary Progress Note  Pulmonary and Critical Care Specialists      Patient - Deborah Grande,  Age - 82 y.o.    - 1935      Room Number - 2112/2112-01   N -  [id number]   [facility] # - [de-identified]  Date of Admission -  4/15/2020  3:05 PM        Mario Venegas MD  Primary Care Physician - Henok Moseley MD     SUBJECTIVE   Patient appears to be in good spirits. Patient's O2 saturations are 92 to 97% on room air. He wants to go home. I did show him his chest x-ray. His x-ray today reveals market improvement of the CHF findings compared to the chest x-ray on April 15. Minimal or no pleural effusions appreciated on the chest x-ray PA and left lateral.  The was shown the x-ray with bedside nursing and charge nurse present. He was very happy. OBJECTIVE   VITALS    height is 6' 6\" (1.981 m) and weight is 209 lb 14.1 oz (95.2 kg). His oral temperature is 97.3 °F (36.3 °C). His blood pressure is 129/88 and his pulse is 69. His respiration is 18 and oxygen saturation is 93%. Body mass index is 24.25 kg/m². Temperature Range: Temp: 97.3 °F (36.3 °C) Temp  Av.5 °F (36.4 °C)  Min: 97.3 °F (36.3 °C)  Max: 97.7 °F (36.5 °C)  BP Range:  Systolic (00HSQ), LPH:504 , Min:129 , QGN:830     Diastolic (68AJM), JBO:06, Min:79, Max:88    Pulse Range: Pulse  Av.3  Min: 69  Max: 73  Respiration Range: Resp  Av.3  Min: 16  Max: 18  Current Pulse Ox[de-identified]  SpO2: 93 %  24HR Pulse Ox Range:  SpO2  Av %  Min: 93 %  Max: 95 %  Oxygen Amount and Delivery: O2 Flow Rate (L/min): 1 L/min    Wt Readings from Last 3 Encounters:   20 209 lb 14.1 oz (95.2 kg)       I/O (24 Hours)  No intake or output data in the 24 hours ending 20 1629    EXAM     General Appearance  Awake, alert, oriented, in no acute distress  HEENT - normocephalic, atraumatic.   Neck - Supple,  trachea midline   Lungs -clear no crackles rales or wheezes  Heart Exam:PMI normal. No lifts, infarction) ([facility]    Acute systolic congestive heart failure ([facility]    Acquired hypothyroidism    Mild malnutrition ([facility]     Patient with dyspnea upon exertion. This has resolved. Discharge okay when okay with other services. Patient was content.   Please call if we can be of further assist.    Electronically signed by Richie Monsalve MD on 4/20/2020 at 4:29 PM

## 2020-04-20 NOTE — DISCHARGE INSTR - COC
"SUBJECTIVE:                                                    Windy Knox is a 14 month old female who presents to clinic today with mother because of:    Chief Complaint   Patient presents with     Eye Lid Swelling     Health Maintenance        HPI:  Eye Problem    Problem started: 2 weeks ago  Location:  Right  Pain:  not applicable  Redness:  YES  Discharge:  YES  Swelling  no  Vision problems:  no  History of trauma or foreign body:  no  Sick contacts: None;  Therapies Tried: None    Mother reports that Windy has had intermittent eyelid redness and swelling for \"months\". She was seen one month ago and advised to do warm compresses TID. Over the past week it has moved from the left upper eyelid to the right upper eyelid and now the lower eyelid as well today. She stopped doing warm compresses one week ago because it was not helping. No fever, vomiting, diarrhea, eye drainage. Runny nose for 1-2 days. No cough. Normal appetite. No sick contacts.       ROS:  Negative for constitutional, eye, ear, nose, throat, skin, respiratory, cardiac, and gastrointestinal other than those outlined in the HPI.    PROBLEM LIST:  Patient Active Problem List    Diagnosis Date Noted     Normal  (single liveborn) 2015     Priority: Medium     Plagiocephaly 2016     Started orthotic around 6 months of age.          MEDICATIONS:  Current Outpatient Prescriptions   Medication Sig Dispense Refill     ferrous sulfate (MEGHANN-IN-SOL) 75 (15 FE) MG/ML oral drops Take 0.4 mLs (6 mg) by mouth daily (Patient not taking: Reported on 2017) 50 mL 1      ALLERGIES:  No Known Allergies    Problem list and histories reviewed & adjusted, as indicated.    OBJECTIVE:                                                      Temp 97.8  F (36.6  C) (Rectal)  Wt 20 lb 14.5 oz (9.483 kg)   No blood pressure reading on file for this encounter.    GENERAL: Active, alert, in no acute distress.  SKIN: Clear. No significant rash, " Continuity of Care Form    Patient Name: Kasandra Quijano   :  1935  MRN:  [id number]    Admit date:  4/15/2020  Discharge date:  2020    Code Status Order: Full Code   Advance Directives:   Advance Care Flowsheet Documentation     Date/Time Healthcare Directive Type of Healthcare Directive Copy in [address] Agent's Name Healthcare Agent's Phone Number    20 1135  Yes, patient has an advance directive for healthcare treatment  Durable power of  for health care  Other (Comment) copy requested from patient  Spouse  --  --    04/15/20 2035  Yes, patient has an advance directive for healthcare treatment  Living will  No, copy requested from family  [address]   [phone]          Admitting Physician:  Elvira Enciso MD  PCP: Emmett Ruelas MD    Discharging Nurse: [facility] Unit/Room#: 2112/-01  Discharging Unit Phone Number: [phone]    Emergency Contact:   Extended Emergency Contact Information  Primary Emergency Contact: Patience Sy  Address: [address], [address] [place] [address]  Home Phone: [phone]  Relation: Spouse  Secondary Emergency Contact: Saul Machado, [address] Phone: [phone]  Relation: Child   needed? No    Past Surgical History:  History reviewed. No pertinent surgical history. Immunization History: There is no immunization history on file for this patient.     Active Problems:  Patient Active Problem List   Diagnosis Code    Atrial fibrillation with RVR ([facility]) I48.91    LBBB (left bundle branch block) I44.7    Elevated troponin R79.89    Essential hypertension I10    H/O TIA (transient ischemic attack) and stroke Z80.78    NSTEMI (non-ST elevated myocardial infarction) ([facility] I65.5    Acute systolic congestive heart failure (H[facility]) I50.21    Acquired hypothyroidism E03.9    Mild malnutrition (HCC) E44.1       Isolation/Infection:   Isolation          No Isolation        Patient abnormal pigmentation or lesions  HEAD: Normocephalic.  EYES:  No discharge. Mild erythema surrounding eyelashes of the upper right eyelid. Pinpoint papule on right upper eyelid that appears yellow. Pinpoint erythematous papule on right lower medial eyelid as well. Normal pupils and EOM.  EARS: Normal canals. Tympanic membranes are normal; gray and translucent.  NOSE: Normal without discharge.  MOUTH/THROAT: Clear. No oral lesions. Teeth intact without obvious abnormalities.  NECK: Supple, no masses.  LYMPH NODES: No adenopathy  LUNGS: Clear. No rales, rhonchi, wheezing or retractions  HEART: Regular rhythm. Normal S1/S2. No murmurs.  ABDOMEN: Soft, non-tender, not distended, no masses or hepatosplenomegaly. Bowel sounds normal.     DIAGNOSTICS: None    ASSESSMENT/PLAN:                                                    1. Blepharitis of right upper eyelid, unspecified type: Most likely blepharitis given migratory pattern over the past few months and erythema isolated to eyelids. Mother presented a photo from this AM that had significant lateral erythema of the right upper eyelid, that has now significantly improved on exam this afternoon. No signs of preseptal or septal cellulitis with EOM intact. No conjunctivitis or drainage noted. No signs of isolated papule, such as chalazion or hordeolum.     -Instructed mother to trial sensitive shampoo to gently wipe eyelid to clear follicles of eyelashes for one week  -Call clinic in one week if not improved for trial of erythromycin eye ointment  -Continue warm compresses 2-3 times daily as tolerated.       FOLLOW UP: If not improving or if worsening    Patient discussed with Dr. Rider, pediatric clinic preceptor.     Daren Thornton DO, MPH  Pediatric Resident, PL[address]      Patient seen and examined with resident and agree with above.    DARRION RIDER MD  [facility]'s

## 2020-04-20 NOTE — DISCHARGE INSTR - DIET

## 2020-04-20 NOTE — PROGRESS NOTES
[place] [address] Internal Medicine    Progress Note    4/20/2020    2:28 PM    Name:   Jose Castellanos  MRN:     [id number]     Acct:      [de-identified]   Room:   2112/2112-01   Day:  5  Admit Date:  4/15/2020  3:05 PM    PCP:   Paz Ngo MD  Code Status:  Full Code    Subjective:     C/C:   Chief Complaint   Patient presents with    Chest Pain    Shortness of Breath         Interval History Status: improved. HPI:   5year-old female admitted with chest pain and shortness of breath. 2-day history prior to admission. Shortness of breath worsened on minimal exertion. No cough no fever  Noted to have new left bundle branch block and atrial fibrillation on admission. Started on heparin infusion and Cardizem drip. Also noted to have bilateral pleural effusion. Review of Systems:     Constitutional: Negative for chills, fatigue, fever and unexpected weight change. HENT: Negative for ear discharge, facial swelling, nosebleeds, sore throat, trouble swallowing and voice change. Eyes: Negative for redness and visual disturbance. Respiratory: Negative for cough, positive for shortness of breath  Cardiovascular: Negative for leg swelling positive for palpitations and occasional left side chest pain  Gastrointestinal: Negative for abdominal pain, blood in stool, diarrhea and vomiting. Genitourinary: Negative for difficulty urinating, dysuria and flank pain. Musculoskeletal: Negative for joint swelling. Skin: Negative for color change and rash. Neurological: Negative for dizziness, seizures, syncope and headaches. Hematological: Negative for adenopathy. Does not bruise/bleed easily. Medications:      Allergies:  No Known Allergies    Current Meds:   Scheduled Meds:    furosemide  40 mg Oral BID    metoprolol succinate  50 mg Oral Daily    apixaban  5 mg Oral BID    spironolactone  25 mg Oral Daily    sacubitril-valsartan  1 tablet Oral BID    nontender, nondistended, normal bowel sounds, no masses, hepatomegaly, splenomegaly  Extremities:  no edema, redness, tenderness in the calves  Skin:  no gross lesions, rashes, induration  Neuro:  Alert, oriented X 3, Gait normal. Non-focal.  Assessment:        Primary Problem  <principal problem not specified>    Active Hospital Problems    Diagnosis Date Noted    LBBB (left bundle branch block) [I44.7] 04/16/2020    Elevated troponin [R79.89] 04/16/2020    Essential hypertension [I10] 04/16/2020    H/O TIA (transient ischemic attack) and stroke [Z86.73] 04/16/2020    NSTEMI (non-ST elevated myocardial infarction) ([facility] [I21.4] 53/31/7460    Acute systolic congestive heart failure ([facility] [I50.21] 04/16/2020    Acquired hypothyroidism [E03.9] 04/16/2020    Mild malnutrition ([facility] [E44.1] 04/16/2020    Atrial fibrillation with RVR ([facility] [I48.91] 04/15/2020           DVT prophylaxis: Has been started on Eliquis      Plan:          42-year-old male with newly documented atrial fibrillation and newly diagnosed acute systolic heart failure. 1.  Atrial fibrillation- converted to sinus earlier today  2. Systolic heart failure-EF 25%, on p.o. Lasix. 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   3.  Bilateral pleural effusions    Dispo: Charge today    Poli Woodward MD  4/20/2020  2:28 PM

## 2020-04-21 ENCOUNTER — CARE COORDINATION (OUTPATIENT)
Dept: CASE MANAGEMENT | Age: 85
End: 2020-04-21

## 2020-04-27 ENCOUNTER — TELEPHONE (OUTPATIENT)
Dept: OTHER | Facility: CLINIC | Age: 85
End: 2020-04-27

## 2020-04-27 ENCOUNTER — APPOINTMENT (OUTPATIENT)
Dept: GENERAL RADIOLOGY | Age: 85
DRG: 312 | End: 2020-04-27
Payer: MEDICARE

## 2020-04-27 ENCOUNTER — HOSPITAL ENCOUNTER (INPATIENT)
Age: 85
LOS: 1 days | Discharge: HOME | DRG: 312 | End: 2020-04-28
Attending: EMERGENCY MEDICINE | Admitting: INTERNAL MEDICINE
Payer: MEDICARE

## 2020-04-27 PROBLEM — Z86.79 ATRIAL FIBRILLATION, CURRENTLY IN SINUS RHYTHM: Status: ACTIVE | Noted: 2020-04-27

## 2020-04-27 PROBLEM — I50.20 SYSTOLIC HEART FAILURE (HCC): Status: ACTIVE | Noted: 2020-04-27

## 2020-04-27 PROBLEM — R55 NEAR SYNCOPE: Status: ACTIVE | Noted: 2020-04-27

## 2020-04-27 LAB
ABSOLUTE EOS #: 0.1 K/UL (ref 0–0.4)
ABSOLUTE IMMATURE GRANULOCYTE: ABNORMAL K/UL (ref 0–0.3)
ABSOLUTE LYMPH #: 0.8 K/UL (ref 1–4.8)
ABSOLUTE MONO #: 0.5 K/UL (ref 0.1–1.3)
ALBUMIN SERPL-MCNC: 3.5 G/DL (ref 3.5–5.2)
ALBUMIN/GLOBULIN RATIO: ABNORMAL (ref 1–2.5)
ALP BLD-CCNC: 77 U/L (ref 40–129)
ALT SERPL-CCNC: 22 U/L (ref 5–41)
ANION GAP SERPL CALCULATED.3IONS-SCNC: 11 MMOL/L (ref 9–17)
AST SERPL-CCNC: 22 U/L
BASOPHILS # BLD: 1 % (ref 0–2)
BASOPHILS ABSOLUTE: 0.1 K/UL (ref 0–0.2)
BILIRUB SERPL-MCNC: 0.83 MG/DL (ref 0.3–1.2)
BNP INTERPRETATION: ABNORMAL
BUN BLDV-MCNC: 21 MG/DL (ref 8–23)
BUN/CREAT BLD: ABNORMAL (ref 9–20)
CALCIUM SERPL-MCNC: 8.8 MG/DL (ref 8.6–10.4)
CHLORIDE BLD-SCNC: 101 MMOL/L (ref 98–107)
CO2: 28 MMOL/L (ref 20–31)
CREAT SERPL-MCNC: 1.26 MG/DL (ref 0.7–1.2)
DIFFERENTIAL TYPE: ABNORMAL
EOSINOPHILS RELATIVE PERCENT: 1 % (ref 0–4)
GFR AFRICAN AMERICAN: >60 ML/MIN
GFR NON-AFRICAN AMERICAN: 55 ML/MIN
GFR SERPL CREATININE-BSD FRML MDRD: ABNORMAL ML/MIN/{1.73_M2}
GFR SERPL CREATININE-BSD FRML MDRD: ABNORMAL ML/MIN/{1.73_M2}
GLUCOSE BLD-MCNC: 124 MG/DL (ref 70–99)
HCT VFR BLD CALC: 39.3 % (ref 41–53)
HEMOGLOBIN: 13.4 G/DL (ref 13.5–17.5)
IMMATURE GRANULOCYTES: ABNORMAL %
LIPASE: 32 U/L (ref 13–60)
LYMPHOCYTES # BLD: 14 % (ref 24–44)
MAGNESIUM: 2.2 MG/DL (ref 1.6–2.6)
MCH RBC QN AUTO: 31 PG (ref 26–34)
MCHC RBC AUTO-ENTMCNC: 34 G/DL (ref 31–37)
MCV RBC AUTO: 91.3 FL (ref 80–100)
MONOCYTES # BLD: 8 % (ref 1–7)
NRBC AUTOMATED: ABNORMAL PER 100 WBC
PDW BLD-RTO: 14.6 % (ref 11.5–14.9)
PLATELET # BLD: 186 K/UL (ref 150–450)
PLATELET ESTIMATE: ABNORMAL
PMV BLD AUTO: 7.8 FL (ref 6–12)
POTASSIUM SERPL-SCNC: 3.4 MMOL/L (ref 3.7–5.3)
PRO-BNP: 816 PG/ML
RBC # BLD: 4.31 M/UL (ref 4.5–5.9)
RBC # BLD: ABNORMAL 10*6/UL
SEG NEUTROPHILS: 76 % (ref 36–66)
SEGMENTED NEUTROPHILS ABSOLUTE COUNT: 4 K/UL (ref 1.3–9.1)
SODIUM BLD-SCNC: 140 MMOL/L (ref 135–144)
TOTAL PROTEIN: 6.3 G/DL (ref 6.4–8.3)
TROPONIN INTERP: ABNORMAL
TROPONIN INTERP: ABNORMAL
TROPONIN T: ABNORMAL NG/ML
TROPONIN T: ABNORMAL NG/ML
TROPONIN, HIGH SENSITIVITY: 30 NG/L (ref 0–22)
TROPONIN, HIGH SENSITIVITY: 32 NG/L (ref 0–22)
WBC # BLD: 5.4 K/UL (ref 3.5–11)
WBC # BLD: ABNORMAL 10*3/UL

## 2020-04-27 PROCEDURE — 71045 X-RAY EXAM CHEST 1 VIEW: CPT

## 2020-04-27 PROCEDURE — 80053 COMPREHEN METABOLIC PANEL: CPT

## 2020-04-27 PROCEDURE — 93005 ELECTROCARDIOGRAM TRACING: CPT | Performed by: EMERGENCY MEDICINE

## 2020-04-27 PROCEDURE — 83880 ASSAY OF NATRIURETIC PEPTIDE: CPT

## 2020-04-27 PROCEDURE — 99223 1ST HOSP IP/OBS HIGH 75: CPT | Performed by: INTERNAL MEDICINE

## 2020-04-27 PROCEDURE — 85025 COMPLETE CBC W/AUTO DIFF WBC: CPT

## 2020-04-27 PROCEDURE — 99285 EMERGENCY DEPT VISIT HI MDM: CPT

## 2020-04-27 PROCEDURE — 2060000000 HC ICU INTERMEDIATE R&B

## 2020-04-27 PROCEDURE — 36415 COLL VENOUS BLD VENIPUNCTURE: CPT

## 2020-04-27 PROCEDURE — 6370000000 HC RX 637 (ALT 250 FOR IP): Performed by: STUDENT IN AN ORGANIZED HEALTH CARE EDUCATION/TRAINING PROGRAM

## 2020-04-27 PROCEDURE — 83690 ASSAY OF LIPASE: CPT

## 2020-04-27 PROCEDURE — 83735 ASSAY OF MAGNESIUM: CPT

## 2020-04-27 PROCEDURE — 84484 ASSAY OF TROPONIN QUANT: CPT

## 2020-04-27 PROCEDURE — 2580000003 HC RX 258: Performed by: STUDENT IN AN ORGANIZED HEALTH CARE EDUCATION/TRAINING PROGRAM

## 2020-04-27 RX ORDER — POTASSIUM CHLORIDE 20 MEQ/1
40 TABLET, EXTENDED RELEASE ORAL PRN
Status: DISCONTINUED | OUTPATIENT
Start: 2020-04-27 | End: 2020-04-28 | Stop reason: HOSPADM

## 2020-04-27 RX ORDER — METOPROLOL SUCCINATE 50 MG/1
50 TABLET, EXTENDED RELEASE ORAL DAILY
Status: CANCELLED | OUTPATIENT
Start: 2020-04-27

## 2020-04-27 RX ORDER — SODIUM CHLORIDE 9 MG/ML
INJECTION, SOLUTION INTRAVENOUS CONTINUOUS
Status: DISCONTINUED | OUTPATIENT
Start: 2020-04-27 | End: 2020-04-28 | Stop reason: HOSPADM

## 2020-04-27 RX ORDER — ACETAMINOPHEN 325 MG/1
650 TABLET ORAL EVERY 6 HOURS PRN
Status: DISCONTINUED | OUTPATIENT
Start: 2020-04-27 | End: 2020-04-28 | Stop reason: HOSPADM

## 2020-04-27 RX ORDER — PROMETHAZINE HYDROCHLORIDE 25 MG/1
12.5 TABLET ORAL EVERY 6 HOURS PRN
Status: CANCELLED | OUTPATIENT
Start: 2020-04-27

## 2020-04-27 RX ORDER — MAGNESIUM SULFATE 1 G/100ML
1 INJECTION INTRAVENOUS PRN
Status: DISCONTINUED | OUTPATIENT
Start: 2020-04-27 | End: 2020-04-28 | Stop reason: HOSPADM

## 2020-04-27 RX ORDER — ONDANSETRON 2 MG/ML
4 INJECTION INTRAMUSCULAR; INTRAVENOUS EVERY 6 HOURS PRN
Status: CANCELLED | OUTPATIENT
Start: 2020-04-27

## 2020-04-27 RX ORDER — POLYETHYLENE GLYCOL 3350 17 G/17G
17 POWDER, FOR SOLUTION ORAL DAILY PRN
Status: DISCONTINUED | OUTPATIENT
Start: 2020-04-27 | End: 2020-04-28 | Stop reason: HOSPADM

## 2020-04-27 RX ORDER — SODIUM CHLORIDE 0.9 % (FLUSH) 0.9 %
10 SYRINGE (ML) INJECTION PRN
Status: DISCONTINUED | OUTPATIENT
Start: 2020-04-27 | End: 2020-04-28 | Stop reason: HOSPADM

## 2020-04-27 RX ORDER — ACETAMINOPHEN 650 MG/1
650 SUPPOSITORY RECTAL EVERY 6 HOURS PRN
Status: DISCONTINUED | OUTPATIENT
Start: 2020-04-27 | End: 2020-04-28 | Stop reason: HOSPADM

## 2020-04-27 RX ORDER — LEVOTHYROXINE SODIUM 0.07 MG/1
75 TABLET ORAL DAILY
Status: DISCONTINUED | OUTPATIENT
Start: 2020-04-27 | End: 2020-04-28 | Stop reason: HOSPADM

## 2020-04-27 RX ORDER — SODIUM CHLORIDE 0.9 % (FLUSH) 0.9 %
10 SYRINGE (ML) INJECTION EVERY 12 HOURS SCHEDULED
Status: DISCONTINUED | OUTPATIENT
Start: 2020-04-27 | End: 2020-04-28 | Stop reason: HOSPADM

## 2020-04-27 RX ORDER — SPIRONOLACTONE 25 MG/1
25 TABLET ORAL DAILY
Status: DISCONTINUED | OUTPATIENT
Start: 2020-04-27 | End: 2020-04-28 | Stop reason: HOSPADM

## 2020-04-27 RX ORDER — TRAZODONE HYDROCHLORIDE 50 MG/1
25 TABLET ORAL NIGHTLY PRN
Status: DISCONTINUED | OUTPATIENT
Start: 2020-04-27 | End: 2020-04-28 | Stop reason: HOSPADM

## 2020-04-27 RX ORDER — ASPIRIN 81 MG/1
81 TABLET, CHEWABLE ORAL DAILY
Status: DISCONTINUED | OUTPATIENT
Start: 2020-04-27 | End: 2020-04-28 | Stop reason: HOSPADM

## 2020-04-27 RX ORDER — FAMOTIDINE 20 MG/1
20 TABLET, FILM COATED ORAL 2 TIMES DAILY
Status: DISCONTINUED | OUTPATIENT
Start: 2020-04-27 | End: 2020-04-28 | Stop reason: HOSPADM

## 2020-04-27 RX ORDER — FLUOXETINE HYDROCHLORIDE 20 MG/1
40 CAPSULE ORAL DAILY
Status: DISCONTINUED | OUTPATIENT
Start: 2020-04-27 | End: 2020-04-28 | Stop reason: HOSPADM

## 2020-04-27 RX ORDER — ATORVASTATIN CALCIUM 80 MG/1
80 TABLET, FILM COATED ORAL DAILY
Status: DISCONTINUED | OUTPATIENT
Start: 2020-04-27 | End: 2020-04-28 | Stop reason: HOSPADM

## 2020-04-27 RX ORDER — POTASSIUM CHLORIDE 7.45 MG/ML
10 INJECTION INTRAVENOUS PRN
Status: DISCONTINUED | OUTPATIENT
Start: 2020-04-27 | End: 2020-04-28 | Stop reason: HOSPADM

## 2020-04-27 RX ORDER — BUPROPION HYDROCHLORIDE 150 MG/1
150 TABLET, EXTENDED RELEASE ORAL 2 TIMES DAILY
Status: DISCONTINUED | OUTPATIENT
Start: 2020-04-27 | End: 2020-04-28 | Stop reason: HOSPADM

## 2020-04-27 RX ADMIN — Medication 10 ML: at 19:25

## 2020-04-27 RX ADMIN — SODIUM CHLORIDE: 9 INJECTION, SOLUTION INTRAVENOUS at 19:25

## 2020-04-27 RX ADMIN — APIXABAN 5 MG: 5 TABLET, FILM COATED ORAL at 20:49

## 2020-04-27 RX ADMIN — SACUBITRIL AND VALSARTAN 1 TABLET: 24; 26 TABLET, FILM COATED ORAL at 20:54

## 2020-04-27 RX ADMIN — BUPROPION HYDROCHLORIDE 150 MG: 150 TABLET, EXTENDED RELEASE ORAL at 20:49

## 2020-04-27 RX ADMIN — TRAZODONE HYDROCHLORIDE 25 MG: 50 TABLET ORAL at 22:27

## 2020-04-27 RX ADMIN — FAMOTIDINE 20 MG: 20 TABLET, FILM COATED ORAL at 20:49

## 2020-04-27 RX ADMIN — ATORVASTATIN CALCIUM 80 MG: 80 TABLET, FILM COATED ORAL at 20:54

## 2020-04-27 RX ADMIN — POTASSIUM CHLORIDE 40 MEQ: 1500 TABLET, EXTENDED RELEASE ORAL at 20:49

## 2020-04-27 RX ADMIN — FLUOXETINE HYDROCHLORIDE 40 MG: 20 CAPSULE ORAL at 20:49

## 2020-04-27 ASSESSMENT — ENCOUNTER SYMPTOMS
BACK PAIN: 0
ABDOMINAL PAIN: 0
COUGH: 0
NAUSEA: 1
SHORTNESS OF BREATH: 0
DIARRHEA: 0
RHINORRHEA: 0
VOMITING: 0

## 2020-04-27 ASSESSMENT — PAIN SCALES - GENERAL: PAINLEVEL_OUTOF10: 0

## 2020-04-27 NOTE — H&P
pertinent surgical history. Medications Prior to Admission:        Prior to Admission medications    Medication Sig Start Date End Date Taking? Authorizing Provider   apixaban (ELIQUIS) 5 MG TABS tablet Take 1 tablet by mouth 2 times daily 4/20/20  Yes Aye Puente MD   metoprolol succinate (TOPROL XL) 50 MG extended release tablet Take 1 tablet by mouth daily 4/21/20  Yes Aye Puente MD   sacubitril-valsartan (ENTRESTO) 24-26 MG per tablet Take 1 tablet by mouth 2 times daily 4/20/20  Yes Aye Puente MD   furosemide (LASIX) 40 MG tablet Take 1 tablet by mouth 2 times daily 4/20/20  Yes Aye Puente MD   spironolactone (ALDACTONE) 25 MG tablet Take 1 tablet by mouth daily 4/21/20  Yes Aye Puente MD   aspirin 81 MG chewable tablet Take 1 tablet by mouth daily 4/21/20  Yes Aye Puente MD   FLUoxetine (PROZAC) 20 MG capsule Take 40 mg by mouth daily  3/24/20  Yes Historical Provider, MD   atorvastatin (LIPITOR) 80 MG tablet Take 80 mg by mouth daily 1/1/20  Yes Historical Provider, MD   buPROPion [facility] SR) 150 MG extended release tablet Take 150 mg by mouth 2 times daily 2/16/20  Yes Historical Provider, MD   levothyroxine (SYNTHROID) 75 MCG tablet Take 75 mcg by mouth daily 8/23/19  Yes Historical Provider, MD   traZODone (DESYREL) 50 MG tablet Take 25-75 mg by mouth nightly as needed for Sleep 3/24/20  Yes Historical Provider, MD        Allergies:     Patient has no known allergies. Social History:     Tobacco:    reports that he has never smoked. He has never used smokeless tobacco.  Alcohol:      has no history on file for alcohol. Drug Use:  has no history on file for drug. Family History:     History reviewed. No pertinent family history. Review of Systems:     Positive and Negative as described in HPI.     Review of Systems    Physical Exam:   BP (!) 140/48   Pulse 52   Temp 97.9 °F (36.6 °C) (Axillary)   Resp 20   Ht 6' 6\" (1.981 m)   Wt 209 lb (94.8 kg)   SpO2 Troponin Interp NOT REPORTED    EKG 12 Lead    Collection Time: 04/27/20  1:52 PM   Result Value Ref Range    Ventricular Rate 53 BPM    Atrial Rate 53 BPM    P-R Interval 234 ms    QRS Duration 122 ms    Q-T Interval 522 ms    QTc Calculation (Bazett) 489 ms    P Axis 67 degrees    R Axis -16 degrees    T Axis 129 degrees       Imaging/Diagnostics:  Xr Chest Standard (2 Vw)    Result Date: 4/20/2020  EXAMINATION: TWO XRAY VIEWS OF THE CHEST 4/20/2020 10:06 am COMPARISON: Chest radiograph performed 04/15/2020. HISTORY: ORDERING SYSTEM PROVIDED HISTORY: chf TECHNOLOGIST PROVIDED HISTORY: chf Reason for Exam: a fib- chf Acuity: Unknown Type of Exam: Unknown FINDINGS: There is no acute consolidation or effusion. There is improvement in the pulmonary congestion. There is no pneumothorax. The heart is mildly prominent. The upper abdomen is unremarkable. The extrathoracic soft tissues are unremarkable. Improvement in the pulmonary congestion without acute process. Xr Chest Portable    Result Date: 4/27/2020  EXAMINATION: ONE XRAY VIEW OF THE CHEST 4/27/2020 1:49 pm COMPARISON: Two-view chest from 04/20/2020 HISTORY: ORDERING SYSTEM PROVIDED HISTORY: presyncope TECHNOLOGIST PROVIDED HISTORY: presyncope Reason for Exam: presyncope Acuity: Unknown Type of Exam: Unknown History of congestive heart failure, hypertension, and previous MI FINDINGS: Overlying ECG monitor leads and snaps. Previous left rotator cuff repair. Mildly enlarged but stable appearing cardiac silhouette. Mediastinal structures midline unchanged. Cephalization of blood flow but no radiographic CHF. Lungs and costophrenic angles clear. Possible slight basilar atelectasis or scarring. Old healed left 7th posterolateral rib fracture. Mild convex-right curvature and DJD thoracic spine. Mild shoulder degenerative changes. Mild cardiomegaly and venous hypertension but no radiographic CHF.      Xr Chest Portable    Result Date: 4/15/2020  EXAMINATION: ONE XRAY VIEW OF THE CHEST 4/15/2020 3:26 pm COMPARISON: None HISTORY: ORDERING SYSTEM PROVIDED HISTORY: chest pain TECHNOLOGIST PROVIDED HISTORY: chest pain Reason for Exam: cough Acuity: Unknown Type of Exam: Unknown Initial evaluation FINDINGS: Monitor wires overlie the chest.  The trachea is midline. There is cardiomegaly and mild vascular congestion. There is atelectasis or infiltrates in the lung bases. There is a small left effusion. Cardiomegaly with mild vascular congestion. Atelectasis or infiltrates in the lung bases. Small left effusion. Follow up to resolution is suggested. Ct Chest Pulmonary Embolism W Contrast    Result Date: 4/15/2020  EXAMINATION: CTA OF THE CHEST 4/15/2020 5:10 pm TECHNIQUE: CTA of the chest was performed after the administration of intravenous contrast.  Multiplanar reformatted images are provided for review. MIP images are provided for review. Dose modulation, iterative reconstruction, and/or weight based adjustment of the mA/kV was utilized to reduce the radiation dose to as low as reasonably achievable. COMPARISON: Chest x-ray 04/15/2020 HISTORY: ORDERING SYSTEM PROVIDED HISTORY: chest pain TECHNOLOGIST PROVIDED HISTORY: chest pain Reason for Exam: chest pain Acuity: Acute Type of Exam: Initial Additional signs and symptoms: PT C/O LEFT SIDED CHEST PAIN & SHORTNESS OF BREATH FOR A DAY AND A HALF FINDINGS: Pulmonary Arteries: No evidence of pulmonary embolism to the segmental level. Distal branches not well evaluated due to motion and areas of compressive atelectasis. Main pulmonary artery unremarkable in size. Mediastinum: Heart is enlarged. Coronary calcifications. No significant pericardial effusion. No suspicious mediastinal, hilar or hilar adenopathy identified Lungs/pleura: Moderate to large bilateral effusions greatest on the right. Areas of compressive atelectasis identified involving both lungs.   Dependent ground-glass medical therapies and most importantly because of direct risk to patient if care not provided in a hospital setting.     Mickey Vital MD  4/27/2020  6:17 PM    Copy sent to Dr. Zenaida Stephesn MD

## 2020-04-27 NOTE — ED NOTES
30 minutes given to PCU RN to review chart and call ED with any questions. Pt to be transported to PCU RM 2094 by wheelchair. Current vital signs:  /71   Pulse 61   Temp 98 °F (36.7 °C) (Oral)   Resp 17   Ht 6' 6\" (1.981 m)   Wt 209 lb (94.8 kg)   SpO2 95%   BMI 24.15 kg/m²                MEWS Score: 0     Any medication or safety alerts were reviewed. Any pending diagnostics and notifications were also reviewed, as well as any safety concerns or issues, abnormal labs, abnormal imaging, and abnormal assessment findings.              Favio Lockhart RN  04/27/20 0833

## 2020-04-27 NOTE — PROGRESS NOTES
Pt admitted to PCU room 2094. Pt transported by wheelchair, ambulated to chair per self. Oriented to room. Pt sitting in chair. Telemetry applied. Vitals taken. Will continue to monitor.

## 2020-04-27 NOTE — ED NOTES
English  [place] ([facility]) sandwich, chips and coffee provided to patient per request, ok per Dr Kal Venegas.       Arianna Cabral RN  04/27/20 [id number]

## 2020-04-27 NOTE — ED PROVIDER NOTES
[address] ED  eMERGENCY dEPARTMENT eNCOUnter      Pt Name: Zoe Gomes  MRN: [id number]  [place] 1935  Date of evaluation: 4/27/20      CHIEF COMPLAINT       Chief Complaint   Patient presents with    Nausea     HISTORY OF PRESENT ILLNESS   HPI 82 y.o. male comes to the emergency department with complaints of lightheadedness dizziness and nausea. The patient states that he was sitting getting his haircut by his wife  Sitting getting his hair cut when he suddenly began to feel extremely dizzy, lightheaded, thought he was going to pass out. The symptoms lasted for about 1 to 2 hours. Wife got scared, called a neighbor who then called the paramedics and they transported him to the emergency department. Was recently admitted to the hospital where he was diagnosed with atrial fibrillation, systolic heart failure with an ejection fraction of 20%, pleural effusions, he was put on multiple new medications noted in the medication list.  He says he has been adherent to these. Patient's symptoms of dizziness and nausea are rated as severe in intensity, constant in course, no clear provocative or palliative factor. REVIEW OF SYSTEMS       Review of Systems   Constitutional: Positive for appetite change. Negative for fever. HENT: Negative for congestion and rhinorrhea. Eyes: Negative for visual disturbance. Respiratory: Negative for cough and shortness of breath. Cardiovascular: Negative for chest pain. Gastrointestinal: Positive for nausea. Negative for abdominal pain, diarrhea and vomiting. Genitourinary: Negative for dysuria. Musculoskeletal: Negative for back pain. Skin: Negative for rash. Neurological: Positive for dizziness and light-headedness. PAST MEDICAL HISTORY     Past Medical History:   Diagnosis Date    [facility]-fib [facility])     [facility]cerebral vascular accident) [facility] [address]     Myocardial infarct, old        SURGICAL HISTORY     History reviewed.  No pertinent surgical presenting with presyncope. Ddx arrhythmia vs. Symptomatic bradycardia vs. Nstemi vs anemia vs renal failure. Sinus bradycardia currently. Possible medication side effect. Checking ekg, labs, cxr. Anticipate hospital admission, telemetry monitoring and cardiology consultation. Hospital course:   Laboratory studies reviewed and they are unremarkable. Chest x-ray shows cardiomegaly but no overt CHF. While here in the emergency department his HR fluctuated from high 40's to low 50's. Admit the patient to the hospital for adjustment of his medications, I think that his dizziness was likely medication related. He should also be evaluated for LifeVest given his severe his Salick heart failure. DIAGNOSTIC RESULTS     EKG: All EKG's are interpreted by the Emergency Department Physician who either signs or Co-signs this chart in the absence of a cardiologist.    EKG shows a sinus bradycardia  HR is 53, , , , no NIDHI, No STD, TWI, the axis is normal.        RADIOLOGY:All plain film, CT, MRI, and formal ultrasound images (except ED bedside ultrasound) are read by the radiologist and the images and interpretations are directly viewed by the emergency physician. XR CHEST PORTABLE   Final Result   Mild cardiomegaly and venous hypertension but no radiographic CHF. LABS: All lab results were reviewed by myself, and all abnormals are listed below.   Labs Reviewed   CBC WITH AUTO DIFFERENTIAL - Abnormal; Notable for the following components:       Result Value    RBC 4.31 (*)     Hemoglobin 13.4 (*)     Hematocrit 39.3 (*)     Seg Neutrophils 76 (*)     Lymphocytes 14 (*)     Monocytes 8 (*)     Absolute Lymph # 0.80 (*)     All other components within normal limits   COMPREHENSIVE METABOLIC PANEL - Abnormal; Notable for the following components:    Glucose 124 (*)     CREATININE 1.26 (*)     Potassium 3.4 (*)     Total Protein 6.3 (*)     GFR Non- 55 (*)

## 2020-04-27 NOTE — PROGRESS NOTES
Medication History completed:    No changes to medication list at this encounter.  Medications confirmed with [employer] on 4/15/20 and with 4/20/20 AVS.     Thank you,  Swathi Sharif, PharmD, BCPS  [phone]

## 2020-04-28 VITALS
BODY MASS INDEX: 24.23 KG/M2 | TEMPERATURE: 98 F | WEIGHT: 209.44 LBS | DIASTOLIC BLOOD PRESSURE: 74 MMHG | RESPIRATION RATE: 18 BRPM | HEART RATE: 52 BPM | OXYGEN SATURATION: 95 % | HEIGHT: 78 IN | SYSTOLIC BLOOD PRESSURE: 152 MMHG

## 2020-04-28 LAB
-: NORMAL
ABSOLUTE EOS #: 0.1 K/UL (ref 0–0.4)
ABSOLUTE IMMATURE GRANULOCYTE: ABNORMAL K/UL (ref 0–0.3)
ABSOLUTE LYMPH #: 1.1 K/UL (ref 1–4.8)
ABSOLUTE MONO #: 0.5 K/UL (ref 0.1–1.3)
ANION GAP SERPL CALCULATED.3IONS-SCNC: 9 MMOL/L (ref 9–17)
BASOPHILS # BLD: 3 % (ref 0–2)
BASOPHILS ABSOLUTE: 0.1 K/UL (ref 0–0.2)
BUN BLDV-MCNC: 21 MG/DL (ref 8–23)
BUN/CREAT BLD: NORMAL (ref 9–20)
CALCIUM SERPL-MCNC: 8.7 MG/DL (ref 8.6–10.4)
CHLORIDE BLD-SCNC: 105 MMOL/L (ref 98–107)
CO2: 28 MMOL/L (ref 20–31)
CREAT SERPL-MCNC: 1.08 MG/DL (ref 0.7–1.2)
DIFFERENTIAL TYPE: ABNORMAL
EKG ATRIAL RATE: 53 BPM
EKG P AXIS: 67 DEGREES
EKG P-R INTERVAL: 234 MS
EKG Q-T INTERVAL: 522 MS
EKG QRS DURATION: 122 MS
EKG QTC CALCULATION (BAZETT): 489 MS
EKG R AXIS: -16 DEGREES
EKG T AXIS: 129 DEGREES
EKG VENTRICULAR RATE: 53 BPM
EOSINOPHILS RELATIVE PERCENT: 2 % (ref 0–4)
GFR AFRICAN AMERICAN: >60 ML/MIN
GFR NON-AFRICAN AMERICAN: >60 ML/MIN
GFR SERPL CREATININE-BSD FRML MDRD: NORMAL ML/MIN/{1.73_M2}
GFR SERPL CREATININE-BSD FRML MDRD: NORMAL ML/MIN/{1.73_M2}
GLUCOSE BLD-MCNC: 86 MG/DL (ref 70–99)
HCT VFR BLD CALC: 37.6 % (ref 41–53)
HEMOGLOBIN: 13 G/DL (ref 13.5–17.5)
IMMATURE GRANULOCYTES: ABNORMAL %
LYMPHOCYTES # BLD: 23 % (ref 24–44)
MAGNESIUM: 2 MG/DL (ref 1.6–2.6)
MCH RBC QN AUTO: 31.2 PG (ref 26–34)
MCHC RBC AUTO-ENTMCNC: 34.7 G/DL (ref 31–37)
MCV RBC AUTO: 89.9 FL (ref 80–100)
MONOCYTES # BLD: 9 % (ref 1–7)
NRBC AUTOMATED: ABNORMAL PER 100 WBC
PDW BLD-RTO: 14.2 % (ref 11.5–14.9)
PLATELET # BLD: 165 K/UL (ref 150–450)
PLATELET ESTIMATE: ABNORMAL
PMV BLD AUTO: 8.1 FL (ref 6–12)
POTASSIUM SERPL-SCNC: 3.8 MMOL/L (ref 3.7–5.3)
RBC # BLD: 4.18 M/UL (ref 4.5–5.9)
RBC # BLD: ABNORMAL 10*6/UL
REASON FOR REJECTION: NORMAL
SEG NEUTROPHILS: 63 % (ref 36–66)
SEGMENTED NEUTROPHILS ABSOLUTE COUNT: 3.2 K/UL (ref 1.3–9.1)
SODIUM BLD-SCNC: 142 MMOL/L (ref 135–144)
TROPONIN INTERP: ABNORMAL
TROPONIN T: ABNORMAL NG/ML
TROPONIN, HIGH SENSITIVITY: 31 NG/L (ref 0–22)
WBC # BLD: 5.1 K/UL (ref 3.5–11)
WBC # BLD: ABNORMAL 10*3/UL
ZZ NTE CLEAN UP: ORDERED TEST: NORMAL
ZZ NTE WITH NAME CLEAN UP: SPECIMEN SOURCE: NORMAL

## 2020-04-28 PROCEDURE — 85025 COMPLETE CBC W/AUTO DIFF WBC: CPT

## 2020-04-28 PROCEDURE — 93010 ELECTROCARDIOGRAM REPORT: CPT | Performed by: INTERNAL MEDICINE

## 2020-04-28 PROCEDURE — 93005 ELECTROCARDIOGRAM TRACING: CPT | Performed by: STUDENT IN AN ORGANIZED HEALTH CARE EDUCATION/TRAINING PROGRAM

## 2020-04-28 PROCEDURE — 6370000000 HC RX 637 (ALT 250 FOR IP): Performed by: STUDENT IN AN ORGANIZED HEALTH CARE EDUCATION/TRAINING PROGRAM

## 2020-04-28 PROCEDURE — 83735 ASSAY OF MAGNESIUM: CPT

## 2020-04-28 PROCEDURE — 80048 BASIC METABOLIC PNL TOTAL CA: CPT

## 2020-04-28 PROCEDURE — 36415 COLL VENOUS BLD VENIPUNCTURE: CPT

## 2020-04-28 PROCEDURE — 84484 ASSAY OF TROPONIN QUANT: CPT

## 2020-04-28 RX ORDER — METOPROLOL SUCCINATE 25 MG/1
25 TABLET, EXTENDED RELEASE ORAL DAILY
Qty: 30 TABLET | Refills: 2 | Status: ON HOLD
Start: 2020-04-29 | End: 2020-09-25 | Stop reason: HOSPADM

## 2020-04-28 RX ORDER — METOPROLOL SUCCINATE 25 MG/1
25 TABLET, EXTENDED RELEASE ORAL DAILY
Qty: 30 TABLET | Refills: 2 | Status: CANCELLED | OUTPATIENT
Start: 2020-04-29

## 2020-04-28 RX ORDER — METOPROLOL SUCCINATE 25 MG/1
25 TABLET, EXTENDED RELEASE ORAL DAILY
Status: DISCONTINUED | OUTPATIENT
Start: 2020-04-29 | End: 2020-04-28 | Stop reason: HOSPADM

## 2020-04-28 RX ADMIN — LEVOTHYROXINE SODIUM 75 MCG: 75 TABLET ORAL at 05:10

## 2020-04-28 RX ADMIN — ASPIRIN 81 MG 81 MG: 81 TABLET ORAL at 08:00

## 2020-04-28 RX ADMIN — ATORVASTATIN CALCIUM 80 MG: 80 TABLET, FILM COATED ORAL at 08:00

## 2020-04-28 RX ADMIN — SACUBITRIL AND VALSARTAN 1 TABLET: 24; 26 TABLET, FILM COATED ORAL at 08:01

## 2020-04-28 RX ADMIN — FLUOXETINE HYDROCHLORIDE 40 MG: 20 CAPSULE ORAL at 08:00

## 2020-04-28 RX ADMIN — APIXABAN 5 MG: 5 TABLET, FILM COATED ORAL at 08:00

## 2020-04-28 RX ADMIN — BUPROPION HYDROCHLORIDE 150 MG: 150 TABLET, EXTENDED RELEASE ORAL at 07:59

## 2020-04-28 RX ADMIN — SPIRONOLACTONE 25 MG: 25 TABLET, FILM COATED ORAL at 08:00

## 2020-04-28 RX ADMIN — FAMOTIDINE 20 MG: 20 TABLET, FILM COATED ORAL at 08:00

## 2020-04-28 ASSESSMENT — ENCOUNTER SYMPTOMS
WHEEZING: 0
COUGH: 0
ABDOMINAL DISTENTION: 0
SHORTNESS OF BREATH: 0
BACK PAIN: 0
VOMITING: 0
NAUSEA: 0
CONSTIPATION: 0
STRIDOR: 0
TROUBLE SWALLOWING: 0
DIARRHEA: 0
CHEST TIGHTNESS: 0
ABDOMINAL PAIN: 0

## 2020-04-28 NOTE — PROGRESS NOTES
Physical Therapy  DATE: 2020    NAME: Jose Castellanos  MRN: [id number]   : 1935    Patient not seen this date for Physical Therapy due to:  [] Blood transfusion in progress  [] Hemodialysis  []  Patient Declined  [] Spine Precautions   [] Strict Bedrest  [] Surgery/ Procedure  [] Testing      [x] Other 2020 at 656- HOLD PT evaluation per nurse [address] due to C/O chest pain. EKG just done. Will check status in the p.m. [] PT being discontinued at this time. Patient independent. No further needs. [] PT being discontinued at this time as the patient has been transferred to palliative care. No further needs.     Tierra Walls, PT

## 2020-04-28 NOTE — CONSULTS
[address]  [address] [place] [address], [address]  [phone]               Cardiology Consult           Date of Admission:  4/27/2020  Date of Consultation:  4/28/2020      PCP:  Opal Valentin MD      Chief Complaint: near syncope. History of Present Illness:  Cheryl Gray is a 82 y.o. male who presents with sitting at table, getting haircut become LH. No syncope. Had sharp CP earlier today fleeting    He was recently in with AF with RVR and presented with low HR this time      PMH:   has a past medical history of A-fib [facility]), CVA (cerebral vascular accident) ([facility] [address] and Myocardial infarct, old. PSH:   has no past surgical history on file. Allergies:  No Known Allergies     Home Meds:    Prior to Admission medications    Medication Sig Start Date End Date Taking?  Authorizing Provider   apixaban (ELIQUIS) 5 MG TABS tablet Take 1 tablet by mouth 2 times daily 4/20/20  Yes Moises Louis MD   metoprolol succinate (TOPROL XL) 50 MG extended release tablet Take 1 tablet by mouth daily 4/21/20  Yes Moises Louis MD   sacubitril-valsartan (ENTRESTO) 24-26 MG per tablet Take 1 tablet by mouth 2 times daily 4/20/20  Yes Moises Louis MD   furosemide (LASIX) 40 MG tablet Take 1 tablet by mouth 2 times daily 4/20/20  Yes Moises Louis MD   spironolactone (ALDACTONE) 25 MG tablet Take 1 tablet by mouth daily 4/21/20  Yes Moises Louis MD   aspirin 81 MG chewable tablet Take 1 tablet by mouth daily 4/21/20  Yes Moises Louis MD   FLUoxetine (PROZAC) 20 MG capsule Take 40 mg by mouth daily  3/24/20  Yes Historical Provider, MD   atorvastatin (LIPITOR) 80 MG tablet Take 80 mg by mouth daily 1/1/20  Yes Historical Provider, MD   buPROPion [facility] SR) 150 MG extended release tablet Take 150 mg by mouth 2 times daily 2/16/20  Yes Historical Provider, MD   levothyroxine (SYNTHROID) 75 MCG tablet Take 75 mcg by mouth daily 8/23/19  Yes Historical Provider, MD   traZODone

## 2020-04-28 NOTE — PROGRESS NOTES
Discharge questions reviewed with patient. Prescriptions and changes in medications discussed. All questions answered to patient satisfaction.

## 2020-04-28 NOTE — PROGRESS NOTES
Allergies:  No Known Allergies    Current Meds:   Scheduled Meds:    apixaban  5 mg Oral BID    aspirin  81 mg Oral Daily    atorvastatin  80 mg Oral Daily    buPROPion  150 mg Oral BID    FLUoxetine  40 mg Oral Daily    levothyroxine  75 mcg Oral Daily    sacubitril-valsartan  1 tablet Oral BID    spironolactone  25 mg Oral Daily    sodium chloride flush  10 mL Intravenous 2 times per day    famotidine  20 mg Oral BID     Continuous Infusions:    sodium chloride 35 mL/hr at 20 1925     PRN Meds: traZODone, sodium chloride flush, acetaminophen **OR** acetaminophen, polyethylene glycol, potassium chloride **OR** potassium alternative oral replacement **OR** potassium chloride, magnesium sulfate    Data:     Past Medical History:   has a past medical history of A-fib ([facility] [address] CVA (cerebral vascular accident) ([facility] [address] and Myocardial infarct, old. Social History:   reports that he has never smoked. He has never used smokeless tobacco.     Family History: History reviewed. No pertinent family history. Vitals:  BP (!) 152/74   Pulse 52   Temp 98 °F (36.7 °C) (Oral)   Resp 18   Ht 6' 6\" (1.981 m)   Wt 209 lb 7 oz (95 kg)   SpO2 95%   BMI 24.20 kg/m²   Temp (24hrs), Av.9 °F (36.6 °C), Min:97.8 °F (36.6 °C), Max:98 °F (36.7 °C)    No results for input(s): POCGLU in the last 72 hours. I/O(24Hr):     Intake/Output Summary (Last 24 hours) at 2020 0923  Last data filed at 2020 0525  Gross per 24 hour   Intake 780 ml   Output --   Net 780 ml       Labs:    CBC:   Lab Results   Component Value Date    WBC 5.1 2020    RBC 4.18 2020    HGB 13.0 2020    HCT 37.6 2020    MCV 89.9 2020    MCH 31.2 2020    MCHC 34.7 2020    RDW 14.2 2020     2020    MPV 8.1 2020     BMP:    Lab Results   Component Value Date     2020    K 3.8 2020     2020    CO2 28 2020    BUN 21 2020    LABALBU 3.5 Primary Problem  Near syncope    Active Hospital Problems    Diagnosis Date Noted    Near syncope [R55] 24/22/8104    Systolic heart failure (HCC) [I50.20] 04/27/2020    Atrial fibrillation, currently in sinus rhythm [Z86.79] 04/27/2020    Symptomatic bradycardia [R00.1]     Acquired hypothyroidism [E03.9] 04/16/2020    Essential hypertension [I10] 04/16/2020       Plan:        Near syncope and nausea 2/2 medication effects vs arrhythmia   - cardio consult, okay for dc recommending changing metoprolol to 25 and stopping entresto   - IV fluids at 35, will hold lasix and metoprolol await cardio recs for meds  -ECHO 04/20 - with severely reduced systolic function, LVEF 50%.   - tele monitor   -Will hold off on zofran QTc 489   - Trop on admission 32 -> 30  - repeat troponin and EKG pending      Bradycardia  -hold metoprolol, EKG showing 1st degree AV block   - consistently in the 46s here     Hx Systolic CHF  -Newly diagnosed <2 weeks ago, does not appear to be in acute exacerbation,   -Continue, Aldactone, ASA, will hold metoprolol and lasix     Hx Atrial Fib  - newly diagnosed <2 weeks   -Continue Eliquis, will hold metoprolol  -CHADSVAS 6 POINTS     Hypothyroidism  -Synthroid 75 daily     Depression  -Resume home meds     PT/OT  Diet: cardiac  DVT: home elliquis   Dispo: dc today     Alli Gautam MD  4/28/2020  9:23 AM

## 2020-04-28 NOTE — DISCHARGE SUMMARY
including potassium 3.8, creatinine 1.08. According to cardiology notes from previous admission patient will need repeat echo near time of f/u with cardiology. Significant therapeutic interventions: IV fluids, medication management    Significant Diagnostic Studies:   Labs / Micro:  CBC:   Lab Results   Component Value Date    WBC 5.1 04/28/2020    RBC 4.18 04/28/2020    HGB 13.0 04/28/2020    HCT 37.6 04/28/2020    MCV 89.9 04/28/2020    MCH 31.2 04/28/2020    MCHC 34.7 04/28/2020    RDW 14.2 04/28/2020     04/28/2020     BMP:    Lab Results   Component Value Date    GLUCOSE 86 04/28/2020     04/28/2020    K 3.8 04/28/2020     04/28/2020    CO2 28 04/28/2020    ANIONGAP 9 04/28/2020    BUN 21 04/28/2020    CREATININE 1.08 04/28/2020    BUNCRER NOT REPORTED 04/28/2020    CALCIUM 8.7 04/28/2020    LABGLOM >60 04/28/2020    GFRAA >60 04/28/2020    GFR      04/28/2020    GFR NOT REPORTED 04/28/2020           Radiology:    Xr Chest Standard (2 Vw)    Result Date: 4/20/2020  EXAMINATION: TWO XRAY VIEWS OF THE CHEST 4/20/2020 10:06 am COMPARISON: Chest radiograph performed 04/15/2020. HISTORY: ORDERING SYSTEM PROVIDED HISTORY: chf TECHNOLOGIST PROVIDED HISTORY: chf Reason for Exam: a fib- chf Acuity: Unknown Type of Exam: Unknown FINDINGS: There is no acute consolidation or effusion. There is improvement in the pulmonary congestion. There is no pneumothorax. The heart is mildly prominent. The upper abdomen is unremarkable. The extrathoracic soft tissues are unremarkable. Improvement in the pulmonary congestion without acute process.      Xr Chest Portable    Result Date: 4/27/2020  EXAMINATION: ONE XRAY VIEW OF THE CHEST 4/27/2020 1:49 pm COMPARISON: Two-view chest from 04/20/2020 HISTORY: ORDERING SYSTEM PROVIDED HISTORY: presyncope TECHNOLOGIST PROVIDED HISTORY: presyncope Reason for Exam: presyncope Acuity: Unknown Type of Exam: Unknown History of congestive heart failure, hypertension, and previous MI FINDINGS: Overlying ECG monitor leads and snaps. Previous left rotator cuff repair. Mildly enlarged but stable appearing cardiac silhouette. Mediastinal structures midline unchanged. Cephalization of blood flow but no radiographic CHF. Lungs and costophrenic angles clear. Possible slight basilar atelectasis or scarring. Old healed left 7th posterolateral rib fracture. Mild convex-right curvature and DJD thoracic spine. Mild shoulder degenerative changes. Mild cardiomegaly and venous hypertension but no radiographic CHF. Xr Chest Portable    Result Date: 4/15/2020  EXAMINATION: ONE XRAY VIEW OF THE CHEST 4/15/2020 3:26 pm COMPARISON: None HISTORY: ORDERING SYSTEM PROVIDED HISTORY: chest pain TECHNOLOGIST PROVIDED HISTORY: chest pain Reason for Exam: cough Acuity: Unknown Type of Exam: Unknown Initial evaluation FINDINGS: Monitor wires overlie the chest.  The trachea is midline. There is cardiomegaly and mild vascular congestion. There is atelectasis or infiltrates in the lung bases. There is a small left effusion. Cardiomegaly with mild vascular congestion. Atelectasis or infiltrates in the lung bases. Small left effusion. Follow up to resolution is suggested. Ct Chest Pulmonary Embolism W Contrast    Result Date: 4/15/2020  EXAMINATION: CTA OF THE CHEST 4/15/2020 5:10 pm TECHNIQUE: CTA of the chest was performed after the administration of intravenous contrast.  Multiplanar reformatted images are provided for review. MIP images are provided for review. Dose modulation, iterative reconstruction, and/or weight based adjustment of the mA/kV was utilized to reduce the radiation dose to as low as reasonably achievable.  COMPARISON: Chest x-ray 04/15/2020 HISTORY: ORDERING SYSTEM PROVIDED HISTORY: chest pain TECHNOLOGIST PROVIDED HISTORY: chest pain Reason for Exam: chest pain Acuity: Acute Type of Exam: Initial Additional signs and symptoms: PT C/O LEFT SIDED CHEST PAIN & SHORTNESS OF BREATH FOR A DAY AND A HALF FINDINGS: Pulmonary Arteries: No evidence of pulmonary embolism to the segmental level. Distal branches not well evaluated due to motion and areas of compressive atelectasis. Main pulmonary artery unremarkable in size. Mediastinum: Heart is enlarged. Coronary calcifications. No significant pericardial effusion. No suspicious mediastinal, hilar or hilar adenopathy identified Lungs/pleura: Moderate to large bilateral effusions greatest on the right. Areas of compressive atelectasis identified involving both lungs. Dependent ground-glass opacities and interlobular septal thickening identified and bronchial wall thickening both lower lobes and apices likely due to edema and vascular congestion. No pneumothorax. Upper Abdomen: Gallbladder is mildly distended. Soft Tissues/Bones: Multilevel degenerate changes of the thoracic spine. No evidence of pulmonary embolism to the segmental level. Cardiomegaly with moderate to large effusions and sequelae of congestion/pulmonary edema throughout the lungs. Findings worrisome for CHF. Consultations:    Consults:     Final Specialist Recommendations/Findings:   IP CONSULT TO INTERNAL MEDICINE  IP CONSULT TO CARDIOLOGY  IP CONSULT TO SOCIAL WORK      The patient was seen and examined on day of discharge and this discharge summary is in conjunction with any daily progress note from day of discharge. Discharge plan:       Disposition: Home    Physician Follow Up:      Donta Walls MD  [phone]  [facility] [id number]  [address] [place] [address], [address] [place] [address], [address] [id number] [phone]    Schedule an appointment as soon as possible for a visit in 3 weeks         Requiring Further Evaluation/Follow Up POST HOSPITALIZATION/Incidental Findings: BMP ordered previous discharge on 4/20 to evaluate cr with lasix addition      Diet: cardiac diet    Activity: As tolerated    Instructions to Patient:   - Take all medications as prescribed. STOP TAKING ENTRESTO, METOPROLOL SUCCINATE CHANGED TO 25MG DAILY FROM 50 MG DAILY   - Follow up with PCP   - In case of any worsening condition please visit Emergency Room. Discharge Medications:      Medication List      CHANGE how you take these medications    metoprolol succinate 25 MG extended release tablet  Commonly known as:  TOPROL XL  Take 1 tablet by mouth daily  Start taking on:  April 29, 2020  What changed:    · medication strength  · how much to take        CONTINUE taking these medications    apixaban 5 MG Tabs tablet  Commonly known as:  ELIQUIS  Take 1 tablet by mouth 2 times daily     aspirin 81 MG chewable tablet  Take 1 tablet by mouth daily     atorvastatin 80 MG tablet  Commonly known as:  LIPITOR     buPROPion 150 MG extended release tablet  Commonly known as:  WELLBUTRIN SR     FLUoxetine 20 MG capsule  Commonly known as:  PROZAC     furosemide 40 MG tablet  Commonly known as:  LASIX  Take 1 tablet by mouth 2 times daily     levothyroxine 75 MCG tablet  Commonly known as:  SYNTHROID     spironolactone 25 MG tablet  Commonly known as:  ALDACTONE  Take 1 tablet by mouth daily     traZODone 50 MG tablet  Commonly known as:  DESYREL        STOP taking these medications    sacubitril-valsartan 24-26 MG per tablet  Commonly known as:  ENTRESTO           Where to Get Your Medications      These medications were sent to [address], [address]    Phone:  [phone]   · metoprolol succinate 25 MG extended release tablet         Time Spent on discharge is  35 mins in patient examination, evaluation, counseling as well as medication reconciliation, prescriptions for required medications, discharge plan and follow up.     Electronically signed by   Laverne De Luna MD  4/28/2020  11:33 AM      Thank you Dr. Elidia Whitman

## 2020-04-29 ENCOUNTER — CARE COORDINATION (OUTPATIENT)
Dept: CASE MANAGEMENT | Age: 85
End: 2020-04-29

## 2020-04-29 LAB
EKG ATRIAL RATE: 52 BPM
EKG P AXIS: 55 DEGREES
EKG P-R INTERVAL: 246 MS
EKG Q-T INTERVAL: 474 MS
EKG QRS DURATION: 126 MS
EKG QTC CALCULATION (BAZETT): 440 MS
EKG R AXIS: 0 DEGREES
EKG T AXIS: 127 DEGREES
EKG VENTRICULAR RATE: 52 BPM

## 2020-04-29 PROCEDURE — 93010 ELECTROCARDIOGRAM REPORT: CPT | Performed by: INTERNAL MEDICINE

## 2020-04-29 NOTE — CARE COORDINATION
[facility] Transitions Initial Follow Up Call    Call within 2 business days of discharge: Yes    Patient: Mark Enriquez Patient : 1935   MRN: [id number]  Reason for Admission: nausea  Discharge Date: 20 RARS: Readmission Risk Score: 14      Last Discharge [facility]       Complaint Diagnosis Description Type Department Provider    20 Nausea Symptomatic bradycardia . .. ED to Hosp-Admission (Discharged) (ADMITTED) Belle Vaughn MD; Anuja Small. .. Spoke with: [address]: [facility]    Non-face-to-face services provided:  covid-19   Writer spoke to patient , he is doing good, VN present when writer called  Patient contacted regarding Sandeep Freeman. Care Transition Nurse/ Ambulatory Care Manager contacted the patient by telephone to perform post discharge assessment. Verified name and  with patient as identifiers. Provided introduction to self, and explanation of the CTN/ACM role, and reason for call due to risk factors for infection and/or exposure to COVID-19. Symptoms reviewed with patient who verbalized the following symptoms: no new symptoms and no worsening symptoms. Due to no new or worsening symptoms encounter was not routed to provider for escalation. Patient has following risk factors of: MI (old), CVA and A-fib. CTN/ACM reviewed discharge instructions, medical action plan and red flags such as increased shortness of breath, increasing fever and signs of decompensation with patient who verbalized understanding. Discussed exposure protocols and quarantine with CDC Guidelines What to do if you are sick with coronavirus disease .  Patient was given an opportunity for questions and concerns. The patient agrees to contact the [facility] exposure line [phone], local health department [place] Department of Health: ([phone]) and PCP office for questions related to their healthcare.  CTN/ACM provided contact information for future needs.    Reviewed and educated patient on any new and changed medications related to discharge diagnosis     Patient/family/caregiver given information for [employer] Loop and agrees to enroll no  Patient's preferred e-mail:   Patient's preferred phone number:   Based on Loop alert triggers, patient will be contacted by nurse care manager for worsening symptoms. Plan for follow-up call in 5-7 days based on severity of symptoms and risk factors. Care Transitions 24 Hour Call    Do you have all of your prescriptions and are they filled?:  No  Post Acute Services:  Home Health (Comment: [facility])  Care Transitions Interventions         Follow Up  No future appointments.     Didier Abdalla RN

## 2020-05-06 ENCOUNTER — CARE COORDINATION (OUTPATIENT)
Dept: CASE MANAGEMENT | Age: 85
End: 2020-05-06

## 2020-05-06 NOTE — CARE COORDINATION
[facility] Transitions Follow Up Call    2020    Patient: Tj Mayer  Patient : 1935   MRN: [id number]  Reason for Admission:   Discharge Date: 20 RARS: Readmission Risk Score: 14         Writer tried to call patient 2 times, both time, patient picked up then hung up phone, writer unable to leave a message, covid calls completed//JU    Care Transitions Subsequent and Final Call    Subsequent and Final Calls  Are you currently active with any services?:  Home Health  Care Transitions Interventions  Other Interventions: Follow Up  No future appointments.     Nubia Chapman RN

## 2020-05-16 PROBLEM — R77.8 ELEVATED TROPONIN: Status: RESOLVED | Noted: 2020-04-16 | Resolved: 2020-05-16

## 2020-08-19 RX ORDER — SPIRONOLACTONE 25 MG/1
TABLET ORAL
Qty: 30 TABLET | Refills: 2 | OUTPATIENT
Start: 2020-08-19

## 2020-08-21 RX ORDER — SPIRONOLACTONE 25 MG/1
TABLET ORAL
Qty: 30 TABLET | Refills: 2 | OUTPATIENT
Start: 2020-08-21

## 2020-09-10 ENCOUNTER — HOSPITAL ENCOUNTER (OUTPATIENT)
Dept: OTHER | Age: 85
Discharge: HOME | End: 2020-09-10
Payer: MEDICARE

## 2020-09-10 LAB
ANION GAP SERPL CALCULATED.3IONS-SCNC: 8 MMOL/L (ref 9–17)
BNP INTERPRETATION: ABNORMAL
BUN BLDV-MCNC: 22 MG/DL (ref 8–23)
CHLORIDE BLD-SCNC: 103 MMOL/L (ref 98–107)
CO2: 30 MMOL/L (ref 20–31)
CREAT SERPL-MCNC: 1.13 MG/DL (ref 0.7–1.2)
GFR AFRICAN AMERICAN: >60 ML/MIN
GFR NON-AFRICAN AMERICAN: >60 ML/MIN
GFR SERPL CREATININE-BSD FRML MDRD: NORMAL ML/MIN/{1.73_M2}
GFR SERPL CREATININE-BSD FRML MDRD: NORMAL ML/MIN/{1.73_M2}
MAGNESIUM: 2.1 MG/DL (ref 1.6–2.6)
POTASSIUM SERPL-SCNC: 3.9 MMOL/L (ref 3.7–5.3)
PRO-BNP: 1201 PG/ML
SODIUM BLD-SCNC: 141 MMOL/L (ref 135–144)

## 2020-09-10 PROCEDURE — 80051 ELECTROLYTE PANEL: CPT

## 2020-09-10 PROCEDURE — 99212 OFFICE O/P EST SF 10 MIN: CPT

## 2020-09-10 PROCEDURE — 83880 ASSAY OF NATRIURETIC PEPTIDE: CPT

## 2020-09-10 PROCEDURE — 36415 COLL VENOUS BLD VENIPUNCTURE: CPT

## 2020-09-10 PROCEDURE — 83735 ASSAY OF MAGNESIUM: CPT

## 2020-09-10 PROCEDURE — 82565 ASSAY OF CREATININE: CPT

## 2020-09-10 PROCEDURE — 84520 ASSAY OF UREA NITROGEN: CPT

## 2020-09-10 ASSESSMENT — EJECTION FRACTION
EF_SOURCE: OTHER
EF_VALUE: 42%

## 2020-09-10 NOTE — PROGRESS NOTES
Viewed DVD on CHF. Discussed CHF-definition,cause,S/S and treatments. Reviewed EF  Last was 42%. Voiced some difficulty in watching salt. Encouraged to weigh daily. Questions encouraged. Verbally reviewed importance of medication compliance with patient; patient verbalized understanding. Discussed 2000mg/day sodium restricted diet; patient verbalized understanding. Moderate daily exercise encouraged as tolerated. Discussed rest breaks as needed; patient verbalized understanding. Patient instructed to weigh self at the same time of each day, using same clothes and same scale; reinforced teaching to monitor for 3-5 lb weight increase over 1-2 days, and to notify the CHF clinic at [phone] or physician office if weight change noted. Patient verbalized understanding. Risks of smoking discussed with the patient if applicable; patient strongly discouraged to smoke. Patient verbalized understanding. Signs and symptoms of CHF discussed with patient, such as feeling more tired than normal, feeling short of breath, coughing that increases when you lie down, sudden weight gain, swelling of your feet, legs or belly. Patient verbalized understanding to notify the CHF clinic at [phone] or physician office if these symptoms occur. Compliance with plan of care and further disease process causes discussed with patient, patient encouraged to keep all follow up appointments. Patient verbalized understanding. Verbally reviewed importance of medication compliance with patient; patient verbalized understanding. Discussed 2000mg/day sodium restricted diet; patient verbalized understanding. Moderate daily exercise encouraged as tolerated. Discussed rest breaks as needed; patient verbalized understanding.     Patient instructed to weigh self at the same time of each day, using same clothes and same scale; reinforced teaching to monitor for 3-5 lb weight increase over 1-2 days, and to notify the CHF clinic at [phone] or physician office if weight change noted. Patient verbalized understanding. Risks of smoking discussed with the patient if applicable; patient strongly discouraged to smoke. Patient verbalized understanding. Signs and symptoms of CHF discussed with patient, such as feeling more tired than normal, feeling short of breath, coughing that increases when you lie down, sudden weight gain, swelling of your feet, legs or belly. Patient verbalized understanding to notify the CHF clinic at [phone] or physician office if these symptoms occur. Compliance with plan of care and further disease process causes discussed with patient, patient encouraged to keep all follow up appointments. Patient verbalized understanding.

## 2020-09-19 ENCOUNTER — HOSPITAL ENCOUNTER (INPATIENT)
Age: 85
LOS: 6 days | Discharge: NURSING, REHAB OR LONG-TERM CARE | DRG: 281 | End: 2020-09-25
Attending: EMERGENCY MEDICINE | Admitting: INTERNAL MEDICINE
Payer: MEDICARE

## 2020-09-19 ENCOUNTER — APPOINTMENT (OUTPATIENT)
Dept: GENERAL RADIOLOGY | Age: 85
DRG: 281 | End: 2020-09-19
Payer: MEDICARE

## 2020-09-19 PROBLEM — I48.91 RAPID ATRIAL FIBRILLATION (HCC): Status: ACTIVE | Noted: 2020-09-19

## 2020-09-19 LAB
ABSOLUTE EOS #: 0.1 K/UL (ref 0–0.4)
ABSOLUTE IMMATURE GRANULOCYTE: ABNORMAL K/UL (ref 0–0.3)
ABSOLUTE LYMPH #: 0.8 K/UL (ref 1–4.8)
ABSOLUTE MONO #: 0.5 K/UL (ref 0.1–1.3)
ANION GAP SERPL CALCULATED.3IONS-SCNC: 9 MMOL/L (ref 9–17)
BASOPHILS # BLD: 1 % (ref 0–2)
BASOPHILS ABSOLUTE: 0.1 K/UL (ref 0–0.2)
BNP INTERPRETATION: ABNORMAL
BUN BLDV-MCNC: 23 MG/DL (ref 8–23)
BUN/CREAT BLD: ABNORMAL (ref 9–20)
CALCIUM SERPL-MCNC: 8.6 MG/DL (ref 8.6–10.4)
CHLORIDE BLD-SCNC: 102 MMOL/L (ref 98–107)
CO2: 28 MMOL/L (ref 20–31)
CREAT SERPL-MCNC: 1.31 MG/DL (ref 0.7–1.2)
DIFFERENTIAL TYPE: ABNORMAL
EOSINOPHILS RELATIVE PERCENT: 2 % (ref 0–4)
GFR AFRICAN AMERICAN: >60 ML/MIN
GFR NON-AFRICAN AMERICAN: 52 ML/MIN
GFR SERPL CREATININE-BSD FRML MDRD: ABNORMAL ML/MIN/{1.73_M2}
GFR SERPL CREATININE-BSD FRML MDRD: ABNORMAL ML/MIN/{1.73_M2}
GLUCOSE BLD-MCNC: 109 MG/DL (ref 75–110)
GLUCOSE BLD-MCNC: 113 MG/DL (ref 70–99)
HCT VFR BLD CALC: 37.7 % (ref 41–53)
HEMOGLOBIN: 13 G/DL (ref 13.5–17.5)
IMMATURE GRANULOCYTES: ABNORMAL %
LYMPHOCYTES # BLD: 12 % (ref 24–44)
MCH RBC QN AUTO: 32.1 PG (ref 26–34)
MCHC RBC AUTO-ENTMCNC: 34.6 G/DL (ref 31–37)
MCV RBC AUTO: 92.8 FL (ref 80–100)
MONOCYTES # BLD: 8 % (ref 1–7)
NRBC AUTOMATED: ABNORMAL PER 100 WBC
PDW BLD-RTO: 14.7 % (ref 11.5–14.9)
PLATELET # BLD: 190 K/UL (ref 150–450)
PLATELET ESTIMATE: ABNORMAL
PMV BLD AUTO: 7.8 FL (ref 6–12)
POTASSIUM SERPL-SCNC: 4.2 MMOL/L (ref 3.7–5.3)
PRO-BNP: 3746 PG/ML
RBC # BLD: 4.06 M/UL (ref 4.5–5.9)
RBC # BLD: ABNORMAL 10*6/UL
SEG NEUTROPHILS: 77 % (ref 36–66)
SEGMENTED NEUTROPHILS ABSOLUTE COUNT: 5.1 K/UL (ref 1.3–9.1)
SODIUM BLD-SCNC: 139 MMOL/L (ref 135–144)
THYROXINE, FREE: 1.12 NG/DL (ref 0.93–1.7)
TROPONIN INTERP: ABNORMAL
TROPONIN INTERP: ABNORMAL
TROPONIN T: ABNORMAL NG/ML
TROPONIN T: ABNORMAL NG/ML
TROPONIN, HIGH SENSITIVITY: 32 NG/L (ref 0–22)
TROPONIN, HIGH SENSITIVITY: 34 NG/L (ref 0–22)
TSH SERPL DL<=0.05 MIU/L-ACNC: 6.59 MIU/L (ref 0.3–5)
WBC # BLD: 6.6 K/UL (ref 3.5–11)
WBC # BLD: ABNORMAL 10*3/UL

## 2020-09-19 PROCEDURE — 6370000000 HC RX 637 (ALT 250 FOR IP): Performed by: GENERAL PRACTICE

## 2020-09-19 PROCEDURE — 82947 ASSAY GLUCOSE BLOOD QUANT: CPT

## 2020-09-19 PROCEDURE — 36415 COLL VENOUS BLD VENIPUNCTURE: CPT

## 2020-09-19 PROCEDURE — 85025 COMPLETE CBC W/AUTO DIFF WBC: CPT

## 2020-09-19 PROCEDURE — 2060000000 HC ICU INTERMEDIATE R&B

## 2020-09-19 PROCEDURE — 84484 ASSAY OF TROPONIN QUANT: CPT

## 2020-09-19 PROCEDURE — 71045 X-RAY EXAM CHEST 1 VIEW: CPT

## 2020-09-19 PROCEDURE — 80048 BASIC METABOLIC PNL TOTAL CA: CPT

## 2020-09-19 PROCEDURE — 84443 ASSAY THYROID STIM HORMONE: CPT

## 2020-09-19 PROCEDURE — 83880 ASSAY OF NATRIURETIC PEPTIDE: CPT

## 2020-09-19 PROCEDURE — 99285 EMERGENCY DEPT VISIT HI MDM: CPT

## 2020-09-19 PROCEDURE — 93005 ELECTROCARDIOGRAM TRACING: CPT

## 2020-09-19 PROCEDURE — 84439 ASSAY OF FREE THYROXINE: CPT

## 2020-09-19 RX ORDER — ATORVASTATIN CALCIUM 80 MG/1
80 TABLET, FILM COATED ORAL DAILY
Status: DISCONTINUED | OUTPATIENT
Start: 2020-09-20 | End: 2020-09-19

## 2020-09-19 RX ORDER — LEVOTHYROXINE SODIUM 0.07 MG/1
75 TABLET ORAL DAILY
Status: DISCONTINUED | OUTPATIENT
Start: 2020-09-20 | End: 2020-09-25 | Stop reason: HOSPADM

## 2020-09-19 RX ORDER — SPIRONOLACTONE 25 MG/1
25 TABLET ORAL DAILY
Status: DISCONTINUED | OUTPATIENT
Start: 2020-09-20 | End: 2020-09-25 | Stop reason: HOSPADM

## 2020-09-19 RX ORDER — TRAZODONE HYDROCHLORIDE 50 MG/1
50 TABLET ORAL NIGHTLY
Status: DISCONTINUED | OUTPATIENT
Start: 2020-09-20 | End: 2020-09-25 | Stop reason: HOSPADM

## 2020-09-19 RX ORDER — ATORVASTATIN CALCIUM 80 MG/1
80 TABLET, FILM COATED ORAL NIGHTLY
Status: DISCONTINUED | OUTPATIENT
Start: 2020-09-19 | End: 2020-09-25 | Stop reason: HOSPADM

## 2020-09-19 RX ORDER — SODIUM CHLORIDE 0.9 % (FLUSH) 0.9 %
10 SYRINGE (ML) INJECTION PRN
Status: DISCONTINUED | OUTPATIENT
Start: 2020-09-19 | End: 2020-09-25 | Stop reason: HOSPADM

## 2020-09-19 RX ORDER — SODIUM CHLORIDE 0.9 % (FLUSH) 0.9 %
10 SYRINGE (ML) INJECTION EVERY 12 HOURS SCHEDULED
Status: DISCONTINUED | OUTPATIENT
Start: 2020-09-19 | End: 2020-09-25 | Stop reason: HOSPADM

## 2020-09-19 RX ORDER — ASPIRIN 81 MG/1
81 TABLET, CHEWABLE ORAL DAILY
Status: DISCONTINUED | OUTPATIENT
Start: 2020-09-20 | End: 2020-09-25 | Stop reason: HOSPADM

## 2020-09-19 RX ORDER — ACETAMINOPHEN 325 MG/1
650 TABLET ORAL EVERY 4 HOURS PRN
Status: DISCONTINUED | OUTPATIENT
Start: 2020-09-19 | End: 2020-09-25 | Stop reason: HOSPADM

## 2020-09-19 RX ORDER — FLUOXETINE HYDROCHLORIDE 20 MG/1
40 CAPSULE ORAL DAILY
Status: DISCONTINUED | OUTPATIENT
Start: 2020-09-20 | End: 2020-09-25 | Stop reason: HOSPADM

## 2020-09-19 RX ORDER — METOPROLOL SUCCINATE 25 MG/1
12.5 TABLET, EXTENDED RELEASE ORAL 2 TIMES DAILY
Status: DISCONTINUED | OUTPATIENT
Start: 2020-09-20 | End: 2020-09-20

## 2020-09-19 RX ORDER — METOPROLOL TARTRATE 50 MG/1
25 TABLET, FILM COATED ORAL ONCE
Status: COMPLETED | OUTPATIENT
Start: 2020-09-19 | End: 2020-09-19

## 2020-09-19 RX ORDER — BUPROPION HYDROCHLORIDE 150 MG/1
150 TABLET, EXTENDED RELEASE ORAL 2 TIMES DAILY
Status: DISCONTINUED | OUTPATIENT
Start: 2020-09-19 | End: 2020-09-25 | Stop reason: HOSPADM

## 2020-09-19 RX ADMIN — METOPROLOL TARTRATE 25 MG: 50 TABLET, FILM COATED ORAL at 20:46

## 2020-09-19 ASSESSMENT — PAIN SCALES - GENERAL: PAINLEVEL_OUTOF10: 0

## 2020-09-19 NOTE — ED PROVIDER NOTES
[address] [facility]  Emergency Department Encounter  EmergencyMedicine Resident     Pt Name:Eddie Moseley  MRN: [id number]  [place] 1935  Date of evaluation: 9/19/20  PCP:  Jaimee Higgins MD    CHIEF COMPLAINT       Chief Complaint   Patient presents with    Fatigue       HISTORY OF PRESENT ILLNESS  (Location/Symptom, Timing/Onset, Context/Setting, Quality, Duration, Modifying Factors, Severity.)      Polina Mondragon is a 82 y.o. male who presents with sudden onset palpitations shortness of breath and fatigue and lightheadedness. This happened while he was standing in the kitchen, he became diaphoretic and states he almost passed out, he walked back to his bedroom and laid down and the symptoms subsided over the course of 1 hour. EMS was called and symptoms have \"almost entirely subsided\", except for some generalized fatigue. Patient was diagnosed with A. fib with RVR approximately 5 weeks ago at [address], placed on metoprolol 12.5 mg extended release twice daily along with Eliquis. Patient also has longstanding history of congestive heart failure with ejection fraction of 40%. He states he has been taking all his medications as prescribed no increased swelling of his lower extremities. Prior to onset of symptoms he had been feeling fine. He denies chest pain at any point during this episode    PAST MEDICAL / SURGICAL / SOCIAL / FAMILY HISTORY      has a past medical history of A-fib ([facility] [address] CVA (cerebral vascular accident) ([facility] [address] and Myocardial infarct, old.       has no past surgical history on file.       Social History     Socioeconomic History    Marital status:      Spouse name: Not on file    Number of children: Not on file    Years of education: Not on file    Highest education level: Not on file   Occupational History    Not on file   Social Needs    Financial resource strain: Not on file    Food insecurity     Worry: Not on file     Inability: Not on file   [employer] Transportation needs     Medical: Not on file     Non-medical: Not on file   Tobacco Use    Smoking status: Never Smoker    Smokeless tobacco: Never Used   Substance and Sexual Activity    Alcohol use: Not on file    Drug use: Not on file    Sexual activity: Not on file   Lifestyle    Physical activity     Days per week: Not on file     Minutes per session: Not on file    Stress: Not on file   Relationships    Social connections     Talks on phone: Not on file     Gets together: Not on file     Attends Gnosticist service: Not on file     Active member of club or organization: Not on file     Attends meetings of clubs or organizations: Not on file     Relationship status: Not on file    Intimate partner violence     Fear of current or ex partner: Not on file     Emotionally abused: Not on file     Physically abused: Not on file     Forced sexual activity: Not on file   Other Topics Concern    Not on file   Social History Narrative    Not on file       No family history on file. Allergies:  Patient has no known allergies. Home Medications:  Prior to Admission medications    Medication Sig Start Date End Date Taking?  Authorizing Provider   metoprolol succinate (TOPROL XL) 25 MG extended release tablet Take 1 tablet by mouth daily  Patient taking differently: Take 25 mg by mouth daily Patient was only taking 12.5mg 4/29/20  Yes Pako Delgado MD   apixaban (ELIQUIS) 5 MG TABS tablet Take 1 tablet by mouth 2 times daily 4/20/20  Yes Rochelle Lofton MD   furosemide (LASIX) 40 MG tablet Take 1 tablet by mouth 2 times daily 4/20/20  Yes Rochelle Lofton MD   spironolactone (ALDACTONE) 25 MG tablet Take 1 tablet by mouth daily 4/21/20  Yes Rochelle Lofton MD   aspirin 81 MG chewable tablet Take 1 tablet by mouth daily 4/21/20  Yes Rochelle Lofton MD   FLUoxetine (PROZAC) 20 MG capsule Take 40 mg by mouth daily  3/24/20  Yes Historical Provider, MD   atorvastatin (LIPITOR) 80 MG tablet Take 80 mg by mouth daily 1/1/20  Yes Historical Provider, MD   buPROPion Juan Jose Servant SR) 150 MG extended release tablet Take 150 mg by mouth 2 times daily 2/16/20  Yes Historical Provider, MD   levothyroxine (SYNTHROID) 75 MCG tablet Take 75 mcg by mouth daily 8/23/19  Yes Historical Provider, MD   traZODone (DESYREL) 50 MG tablet Take 25-75 mg by mouth nightly as needed for Sleep 3/24/20  Yes Historical Provider, MD       REVIEW OF SYSTEMS    (2-9 systems for level 4, 10 or more for level 5)      Review of Systems   Constitutional: Negative. HENT: Negative. Eyes: Negative. Respiratory: Positive for shortness of breath. Negative for chest tightness. Cardiovascular: Positive for palpitations. Negative for chest pain. Gastrointestinal: Negative for abdominal pain, nausea and vomiting. Endocrine: Negative. Genitourinary: Negative. Musculoskeletal: Negative. Neurological: Positive for dizziness, weakness and light-headedness. Negative for syncope. Hematological: Negative. Psychiatric/Behavioral: Negative. PHYSICAL EXAM   (up to 7 for level 4, 8 or more for level 5)      INITIAL VITALS:   /88   Pulse 116   Temp 97.9 °F (36.6 °C) (Oral)   Resp 14   Ht 6' 6\" (1.981 m)   Wt 204 lb 5.9 oz (92.7 kg)   SpO2 97%   BMI 23.62 kg/m²     Physical Exam   Gen. Appearance: patient appears well, nondistressed. HEENT: head atraumatic, normocephalic. Pupils equal and reactive to light. Oropharynx clear and moist.  Neck: Supple, normal range of motion. No lymphadenopathy. Pulmonary: Lungs clear to auscultation bilaterally. Equal air entry right left. Cardiovascular:  Tachycardic, irregular   Peripheral pulses strong, regular, equal.   Abdomen: Soft, nontender, nondistended. Bowel sounds normal. No palpable masses. Neurology: GCS 15. Oriented to person place and time. Normal tone and power in all 4 extremities. No sensory deficits.   CNII-XII intact  Skin: Warm, dry, well perfused      DIFFERENTIAL  DIAGNOSIS     PLAN (LABS / IMAGING / EKG):  Orders Placed This Encounter   Procedures    XR CHEST PORTABLE    TSH without Reflex    Basic Metabolic Panel    CBC Auto Differential    T4, Free    Troponin    Troponin    Brain Natriuretic Peptide    CBC    Basic Metabolic Panel w/ Reflex to MG    Magnesium    Troponin    DIET CARDIAC;    Telemetry Monitoring    Vital signs per unit routine    Notify physician    Notify physician    Up as tolerated    Telemetry monitoring - continuous duration    Full Code    Inpatient consult to Internal Medicine    Inpatient consult to Cardiology    POC Glucose Fingerstick    EKG 12 Lead    PATIENT STATUS (FROM ED OR OR/PROCEDURAL) Inpatient       MEDICATIONS ORDERED:  Orders Placed This Encounter   Medications    metoprolol tartrate (LOPRESSOR) tablet 25 mg    sodium chloride flush 0.9 % injection 10 mL    sodium chloride flush 0.9 % injection 10 mL    acetaminophen (TYLENOL) tablet 650 mg    apixaban (ELIQUIS) tablet 5 mg    aspirin chewable tablet 81 mg    DISCONTD: atorvastatin (LIPITOR) tablet 80 mg    buPROPion (WELLBUTRIN SR) extended release tablet 150 mg    FLUoxetine (PROZAC) capsule 40 mg    levothyroxine (SYNTHROID) tablet 75 mcg    spironolactone (ALDACTONE) tablet 25 mg    DISCONTD: metoprolol succinate (TOPROL XL) extended release tablet 12.5 mg    atorvastatin (LIPITOR) tablet 80 mg    traZODone (DESYREL) tablet 50 mg    metoprolol succinate (TOPROL XL) extended release tablet 50 mg    OR Linked Order Group     potassium chloride (KLOR-CON M) extended release tablet 40 mEq     potassium bicarb-citric acid (EFFER-K) effervescent tablet 40 mEq     potassium chloride 10 mEq/100 mL IVPB (Peripheral Line)           DIAGNOSTIC RESULTS / EMERGENCY DEPARTMENT COURSE / MDM     LABS:  Results for orders placed or performed during the hospital encounter of 09/19/20   TSH without Reflex   Result Value Ref Range    TSH 6.59 (H) 0.30 - 5.00 mIU/L   Basic Metabolic Panel   Result Value Ref Range    Glucose 113 (H) 70 - 99 mg/dL    BUN 23 8 - 23 mg/dL    CREATININE 1.31 (H) 0.70 - 1.20 mg/dL    Bun/Cre Ratio NOT REPORTED 9 - 20    Calcium 8.6 8.6 - 10.4 mg/dL    Sodium 139 135 - 144 mmol/L    Potassium 4.2 3.7 - 5.3 mmol/L    Chloride 102 98 - 107 mmol/L    CO2 28 20 - 31 mmol/L    Anion Gap 9 9 - 17 mmol/L    GFR Non-African American 52 (L) >60 mL/min    GFR African American >60 >60 mL/min    GFR Comment          GFR Staging NOT REPORTED    CBC Auto Differential   Result Value Ref Range    WBC 6.6 3.5 - 11.0 k/uL    RBC 4.06 (L) 4.5 - 5.9 m/uL    Hemoglobin 13.0 (L) 13.5 - 17.5 g/dL    Hematocrit 37.7 (L) 41 - 53 %    MCV 92.8 80 - 100 fL    MCH 32.1 26 - 34 pg    MCHC 34.6 31 - 37 g/dL    RDW 14.7 11.5 - 14.9 %    Platelets 634 659 - 179 k/uL    MPV 7.8 6.0 - 12.0 fL    NRBC Automated NOT REPORTED per 100 WBC    Differential Type NOT REPORTED     Seg Neutrophils 77 (H) 36 - 66 %    Lymphocytes 12 (L) 24 - 44 %    Monocytes 8 (H) 1 - 7 %    Eosinophils % 2 0 - 4 %    Basophils 1 0 - 2 %    Immature Granulocytes NOT REPORTED 0 %    Segs Absolute 5.10 1.3 - 9.1 k/uL    Absolute Lymph # 0.80 (L) 1.0 - 4.8 k/uL    Absolute Mono # 0.50 0.1 - 1.3 k/uL    Absolute Eos # 0.10 0.0 - 0.4 k/uL    Basophils Absolute 0.10 0.0 - 0.2 k/uL    Absolute Immature Granulocyte NOT REPORTED 0.00 - 0.30 k/uL    WBC Morphology NOT REPORTED     RBC Morphology NOT REPORTED     Platelet Estimate NOT REPORTED    T4, Free   Result Value Ref Range    Thyroxine, Free 1.12 0.93 - 1.70 ng/dL   Troponin   Result Value Ref Range    Troponin, High Sensitivity 32 (H) 0 - 22 ng/L    Troponin T NOT REPORTED <0.03 ng/mL    Troponin Interp NOT REPORTED    Troponin   Result Value Ref Range    Troponin, High Sensitivity 34 (H) 0 - 22 ng/L    Troponin T NOT REPORTED <0.03 ng/mL    Troponin Interp NOT REPORTED    Brain Natriuretic Peptide   Result Value Ref Range Pro-BNP 3,746 (H) <300 pg/mL    BNP Interpretation Pro-BNP Reference Range:    CBC   Result Value Ref Range    WBC 8.4 3.5 - 11.0 k/uL    RBC 4.02 (L) 4.5 - 5.9 m/uL    Hemoglobin 13.2 (L) 13.5 - 17.5 g/dL    Hematocrit 37.4 (L) 41 - 53 %    MCV 93.1 80 - 100 fL    MCH 32.9 26 - 34 pg    MCHC 35.3 31 - 37 g/dL    RDW 14.1 11.5 - 14.9 %    Platelets 368 430 - 520 k/uL    MPV 7.6 6.0 - 12.0 fL    NRBC Automated NOT REPORTED per 100 WBC   Basic Metabolic Panel w/ Reflex to MG   Result Value Ref Range    Glucose 148 (H) 70 - 99 mg/dL    BUN 18 8 - 23 mg/dL    CREATININE 1.22 (H) 0.70 - 1.20 mg/dL    Bun/Cre Ratio NOT REPORTED 9 - 20    Calcium 9.2 8.6 - 10.4 mg/dL    Sodium 140 135 - 144 mmol/L    Potassium 3.5 (L) 3.7 - 5.3 mmol/L    Chloride 104 98 - 107 mmol/L    CO2 29 20 - 31 mmol/L    Anion Gap 7 (L) 9 - 17 mmol/L    GFR Non-African American 57 (L) >60 mL/min    GFR African American >60 >60 mL/min    GFR Comment          GFR Staging NOT REPORTED    Magnesium   Result Value Ref Range    Magnesium 2.3 1.6 - 2.6 mg/dL   Troponin   Result Value Ref Range    Troponin, High Sensitivity 38 (H) 0 - 22 ng/L    Troponin T NOT REPORTED <0.03 ng/mL    Troponin Interp NOT REPORTED    POC Glucose Fingerstick   Result Value Ref Range    POC Glucose 109 75 - 110 mg/dL   EKG 12 Lead   Result Value Ref Range    Ventricular Rate 115 BPM    Atrial Rate 82 BPM    QRS Duration 158 ms    Q-T Interval 404 ms    QTc Calculation (Bazett) 558 ms    R Axis -53 degrees    T Axis 95 degrees       RADIOLOGY:  None    EKG  Atrial fibrillation with RVR, left axis deviation, left bundle branch block, unchanged from prior    All EKG's are interpreted by the Emergency Department Physician who either signs or Co-signs this chart in the absence of a cardiologist.    EMERGENCY DEPARTMENT COURSE:  82 y.o. male who presents with palpitations lightheadedness resolved over the course of an hour.   Patient is in A. fib with RVR at a rate of 119 on arrival.  Patient takes 12.5 mg of metoprolol extended release twice daily has not taken his evening dose. Cardiology note mentions 25 mg metoprolol extended release twice daily, But patient has not been taking this. Patient has congestive heart failure with a bump in his proBNP. He is resting comfortably and in no acute distress at this time, denies chest pain. Troponin appears to be baseline. EKG shows left bundle branch block with A. fib, unchanged from prior ECGs. 25 mg of Lopressor given here in the department. Patient will be admitted and restarted on his home medications with cardiology evaluation. ACS is unlikely at this time. Patient is anticoagulated already and does not clinically sound like a PE.        ED Course as of Sep 21 0007   Sat Sep 19, 2020   2115 Baseline 1.1   Creatinine(!): 1.31 [BRICE]   2115 Baseline 1200     Pro-BNP(!): 8,488 [BRICE]      ED Course User Index  [BRICE] Olaf Yañez DO       PROCEDURES:  None    CONSULTS:  IP CONSULT TO INTERNAL MEDICINE  IP CONSULT TO CARDIOLOGY    CRITICAL CARE:  None    FINAL IMPRESSION      1.  Atrial fibrillation with RVR ([address]          DISPOSITION / PLAN     DISPOSITION Admitted 09/19/2020 09:41:11 PM      PATIENT REFERRED TO:  Kimberly Hernandes MD  [facility]. [phone]  [place] [phone]            DISCHARGE MEDICATIONS:  Current Discharge Medication List          Robles Whaley MD  Emergency Medicine Resident    (Please note that portions of thisnote were completed with a voice recognition program.  Efforts were made to edit the dictations but occasionally words are mis-transcribed.)       Olaf Yañez DO  Resident  09/19/20 0839       Robles Whaley MD  09/21/20 [id number]

## 2020-09-19 NOTE — ED PROVIDER NOTES
[address]  eMERGENCY dEPARTMENT eNCOUnter   Attending Attestation     Pt Name: Ivy Osborne  MRN: [id number]  [place] 1935  Date of evaluation: 9/19/20    History, EXAM, MDM:    Ivy Osborne is a 82 y.o. male who presents with No chief complaint on file. Recent diagnosis of afib. On eliquis and metoprolol. Today feeling palpitations and fatigue. EKG shows a atrial fibrillation  HR is 115, , , no NIDHI, No STD, No TWI, the axis is leftwards. Laboratory studies show bnp elevation. Likely secondary to his afib with rvr. CXR unremarkable. Providign treatment for rate control. Symptoms most consistent with uncontrolled afib with rvr. Admitting pt to hospital for heart rate control. Vitals:   Vitals:    09/19/20 1941   BP: (!) 140/95   Pulse: 127   Resp: 16   Temp: 97.5 °F (36.4 °C)   TempSrc: Oral   SpO2: 97%   Weight: 205 lb (93 kg)   Height: 6' 6\" (1.981 m)     I performed a history and physical examination of the patient and discussed management with the resident. I reviewed the residents note and agree with the documented findings and plan of care. Any areas of disagreement are noted on the chart. I was personally present for the key portions of any procedures. I have documented in the chart those procedures where I was not present during the key portions. I have personally reviewed all images and agree with the resident's interpretation. I have reviewed the emergency nurses triage note. I agree with the chief complaint, past medical history, past surgical history, allergies, medications, social and family history as documented unless otherwise noted below. Documentation of the HPI, Physical Exam and Medical Decision Making performed by medical students or scribes is based on my personal performance of the HPI, PE and MDM.  For Phys Assistant/ Nurse Practitioner cases/documentation I have had a face to face evaluation of this patient and have completed at least one if not all key elements of the E/M (history, physical exam, and MDM). Additional findings are as noted.     María King MD  Attending Emergency  Physician                María King MD  09/21/20 6210

## 2020-09-19 NOTE — ED NOTES
Bed: 12  Expected date:   Expected time:   Means of arrival:   Comments:  Gabriela Mendez RN  09/19/20 1939

## 2020-09-20 LAB
ANION GAP SERPL CALCULATED.3IONS-SCNC: 7 MMOL/L (ref 9–17)
BUN BLDV-MCNC: 18 MG/DL (ref 8–23)
BUN/CREAT BLD: ABNORMAL (ref 9–20)
CALCIUM SERPL-MCNC: 9.2 MG/DL (ref 8.6–10.4)
CHLORIDE BLD-SCNC: 104 MMOL/L (ref 98–107)
CO2: 29 MMOL/L (ref 20–31)
CREAT SERPL-MCNC: 1.22 MG/DL (ref 0.7–1.2)
GFR AFRICAN AMERICAN: >60 ML/MIN
GFR NON-AFRICAN AMERICAN: 57 ML/MIN
GFR SERPL CREATININE-BSD FRML MDRD: ABNORMAL ML/MIN/{1.73_M2}
GFR SERPL CREATININE-BSD FRML MDRD: ABNORMAL ML/MIN/{1.73_M2}
GLUCOSE BLD-MCNC: 148 MG/DL (ref 70–99)
HCT VFR BLD CALC: 37.4 % (ref 41–53)
HEMOGLOBIN: 13.2 G/DL (ref 13.5–17.5)
MAGNESIUM: 2.3 MG/DL (ref 1.6–2.6)
MCH RBC QN AUTO: 32.9 PG (ref 26–34)
MCHC RBC AUTO-ENTMCNC: 35.3 G/DL (ref 31–37)
MCV RBC AUTO: 93.1 FL (ref 80–100)
NRBC AUTOMATED: ABNORMAL PER 100 WBC
PDW BLD-RTO: 14.1 % (ref 11.5–14.9)
PLATELET # BLD: 200 K/UL (ref 150–450)
PMV BLD AUTO: 7.6 FL (ref 6–12)
POTASSIUM SERPL-SCNC: 3.5 MMOL/L (ref 3.7–5.3)
RBC # BLD: 4.02 M/UL (ref 4.5–5.9)
SODIUM BLD-SCNC: 140 MMOL/L (ref 135–144)
TROPONIN INTERP: ABNORMAL
TROPONIN T: ABNORMAL NG/ML
TROPONIN, HIGH SENSITIVITY: 38 NG/L (ref 0–22)
WBC # BLD: 8.4 K/UL (ref 3.5–11)

## 2020-09-20 PROCEDURE — 6370000000 HC RX 637 (ALT 250 FOR IP): Performed by: INTERNAL MEDICINE

## 2020-09-20 PROCEDURE — 6370000000 HC RX 637 (ALT 250 FOR IP): Performed by: GENERAL PRACTICE

## 2020-09-20 PROCEDURE — 85027 COMPLETE CBC AUTOMATED: CPT

## 2020-09-20 PROCEDURE — 84484 ASSAY OF TROPONIN QUANT: CPT

## 2020-09-20 PROCEDURE — 2060000000 HC ICU INTERMEDIATE R&B

## 2020-09-20 PROCEDURE — 99223 1ST HOSP IP/OBS HIGH 75: CPT | Performed by: INTERNAL MEDICINE

## 2020-09-20 PROCEDURE — 83735 ASSAY OF MAGNESIUM: CPT

## 2020-09-20 PROCEDURE — 2580000003 HC RX 258: Performed by: GENERAL PRACTICE

## 2020-09-20 PROCEDURE — 36415 COLL VENOUS BLD VENIPUNCTURE: CPT

## 2020-09-20 PROCEDURE — 80048 BASIC METABOLIC PNL TOTAL CA: CPT

## 2020-09-20 RX ORDER — METOPROLOL SUCCINATE 50 MG/1
50 TABLET, EXTENDED RELEASE ORAL 2 TIMES DAILY
Status: DISCONTINUED | OUTPATIENT
Start: 2020-09-20 | End: 2020-09-21

## 2020-09-20 RX ORDER — POTASSIUM CHLORIDE 7.45 MG/ML
10 INJECTION INTRAVENOUS PRN
Status: DISCONTINUED | OUTPATIENT
Start: 2020-09-20 | End: 2020-09-25 | Stop reason: HOSPADM

## 2020-09-20 RX ORDER — POTASSIUM CHLORIDE 20 MEQ/1
40 TABLET, EXTENDED RELEASE ORAL PRN
Status: DISCONTINUED | OUTPATIENT
Start: 2020-09-20 | End: 2020-09-25 | Stop reason: HOSPADM

## 2020-09-20 RX ADMIN — ATORVASTATIN CALCIUM 80 MG: 80 TABLET, FILM COATED ORAL at 21:53

## 2020-09-20 RX ADMIN — METOPROLOL SUCCINATE 12.5 MG: 25 TABLET, EXTENDED RELEASE ORAL at 09:21

## 2020-09-20 RX ADMIN — BUPROPION HYDROCHLORIDE 150 MG: 150 TABLET, EXTENDED RELEASE ORAL at 00:11

## 2020-09-20 RX ADMIN — APIXABAN 5 MG: 5 TABLET, FILM COATED ORAL at 09:20

## 2020-09-20 RX ADMIN — TRAZODONE HYDROCHLORIDE 50 MG: 50 TABLET ORAL at 21:53

## 2020-09-20 RX ADMIN — BUPROPION HYDROCHLORIDE 150 MG: 150 TABLET, EXTENDED RELEASE ORAL at 21:53

## 2020-09-20 RX ADMIN — Medication 10 ML: at 00:00

## 2020-09-20 RX ADMIN — METOPROLOL SUCCINATE 50 MG: 50 TABLET, EXTENDED RELEASE ORAL at 21:52

## 2020-09-20 RX ADMIN — ATORVASTATIN CALCIUM 80 MG: 80 TABLET, FILM COATED ORAL at 00:11

## 2020-09-20 RX ADMIN — Medication 10 ML: at 09:24

## 2020-09-20 RX ADMIN — METOPROLOL SUCCINATE 50 MG: 50 TABLET, EXTENDED RELEASE ORAL at 11:31

## 2020-09-20 RX ADMIN — APIXABAN 5 MG: 5 TABLET, FILM COATED ORAL at 21:53

## 2020-09-20 RX ADMIN — FLUOXETINE HYDROCHLORIDE 40 MG: 20 CAPSULE ORAL at 09:20

## 2020-09-20 RX ADMIN — APIXABAN 5 MG: 5 TABLET, FILM COATED ORAL at 00:11

## 2020-09-20 RX ADMIN — SPIRONOLACTONE 25 MG: 25 TABLET, FILM COATED ORAL at 09:20

## 2020-09-20 RX ADMIN — TRAZODONE HYDROCHLORIDE 50 MG: 50 TABLET ORAL at 00:11

## 2020-09-20 RX ADMIN — ASPIRIN 81 MG CHEWABLE TABLET 81 MG: 81 TABLET CHEWABLE at 09:20

## 2020-09-20 RX ADMIN — BUPROPION HYDROCHLORIDE 150 MG: 150 TABLET, EXTENDED RELEASE ORAL at 09:20

## 2020-09-20 RX ADMIN — POTASSIUM CHLORIDE 40 MEQ: 1500 TABLET, EXTENDED RELEASE ORAL at 15:27

## 2020-09-20 RX ADMIN — Medication 10 ML: at 21:53

## 2020-09-20 ASSESSMENT — ENCOUNTER SYMPTOMS
NAUSEA: 0
CHEST TIGHTNESS: 0
DIARRHEA: 0
PHOTOPHOBIA: 0
COUGH: 0
BACK PAIN: 0
EYE DISCHARGE: 0
ABDOMINAL PAIN: 0
VOMITING: 0
SHORTNESS OF BREATH: 0
RHINORRHEA: 0

## 2020-09-20 ASSESSMENT — PAIN SCALES - GENERAL
PAINLEVEL_OUTOF10: 0
PAINLEVEL_OUTOF10: 0

## 2020-09-20 NOTE — PLAN OF CARE
Problem: Falls - Risk of:  Goal: Absence of physical injury  Description: Absence of physical injury  9/20/2020 0532 by Mino Delgado RN  Outcome: Met This Shift  Note: Patient will continue to be free from physical injury. Patient's bed alarm is on and hourly rounding is being completed to ensure all patient needs are met. Problem: Activity:  Goal: Expression of feelings of increased energy will increase  Description: Expression of feelings of increased energy will increase  9/20/2020 0532 by Mino Delgado RN  Outcome: Met This Shift  Note: Patient stated that the symptoms have subsided. Problem: Falls - Risk of:  Goal: Will remain free from falls  Description: Will remain free from falls  9/20/2020 1518 by Jarod Guadalupe RN  Outcome: Ongoing  Note: Patient remained free of falls throughout the shift, bed low and locked, call light used appropriately. 9/20/2020 0532 by Mino Delgado RN  Outcome: Met This Shift  Note: Will continue to monitor patient needs hourly and respond in a timely fashion in order to decrease the risk of patient falls. Problem: Activity:  Goal: Ability to tolerate increased activity will improve  Description: Ability to tolerate increased activity will improve  Outcome: Ongoing  Note: Patient remained in afib with a better rate control. Problem: Cardiac:  Goal: Ability to maintain an adequate cardiac output will improve  Description: Ability to maintain an adequate cardiac output will improve  Outcome: Ongoing     Problem: Coping:  Goal: Level of anxiety will decrease  Description: Level of anxiety will decrease  Outcome: Ongoing     Problem: Safety:  Goal: Will show no signs and symptoms of excessive bleeding  Description: Will show no signs and symptoms of excessive bleeding  9/20/2020 0532 by Mino Delgado RN  Outcome: Ongoing  Note: Will continue to assess and monitor patient for any signs and symptoms of excessive bleeding due to blood thinners.

## 2020-09-20 NOTE — PROGRESS NOTES
Patient has arrived to patient room via wheelchair. Patient independently transferred to unit bed. Patient positioned in unit bed in lowest locked position with side rails up x 2. Patients attached to telemetry, vitals taken and assessment completed. Patient oriented to room and explained call light. No signs of distress noted at this time. Will continue to monitor.     Electronically signed by Annamaria Moss RN on 9/19/2020 at 10:57 PM

## 2020-09-20 NOTE — H&P
[id number] [facility] NOTE/H&P  Date: 9/20/2020  Patient Name: Josey López  MRN: [id number]  Acct: [de-identified]   Date of Admission: 9/19/2020  7:39 PM  YOB: 1935  Primary Care Physician: Kar Dallas MD  Attending Physician: Jackie Mao MD   History Obtained From:  Patient, Electronic Medical Records    Chief Complaint     Chief Complaint   Patient presents with    Fatigue      History of Presenting Illness     Josey López is a 82 y.o. male with PMH significant for chronic Atrial fibrillation on Eliquis who presents with a chief complaint of palpitations and lightheadedness since last evening. Patient felt lightheaded and had some shortness of breath. He then held on the counter and waited until he felt a little better. Jessica Bhagat continued to feel unwell and EMS was called. He was found to be in A fib with RVR and brought to the ER. Review of Systems:  Review of Systems   Constitutional: Negative for activity change, appetite change, chills, diaphoresis, fever and unexpected weight change. HENT: Negative for ear discharge, ear pain, rhinorrhea and tinnitus. Eyes: Negative for photophobia and discharge. Respiratory: Negative for cough, chest tightness and shortness of breath. Cardiovascular: Positive for palpitations. Negative for chest pain. Gastrointestinal: Negative for abdominal pain, diarrhea, nausea and vomiting. Genitourinary: Negative for dysuria and frequency. Musculoskeletal: Negative for back pain and neck pain. Skin: Negative for rash and wound. Neurological: Negative for seizures, weakness and headaches. Psychiatric/Behavioral: Negative for agitation and confusion.      Past Medical History     Past Medical History:   Diagnosis Date    A-fib [facility])     CVA [facility]cerebral vascular accident) ([facility] [address]     Myocardial infarct, old      Past Surgical History   No past surgical history on file.  Allergies   Patient has no known allergies. Home Medications     Prior to Admission medications    Medication Sig Start Date End Date Taking? Authorizing Provider   metoprolol succinate (TOPROL XL) 25 MG extended release tablet Take 1 tablet by mouth daily  Patient taking differently: Take 25 mg by mouth daily Patient was only taking 12.5mg 20  Yes Roberto Javier MD   apixaban (ELIQUIS) 5 MG TABS tablet Take 1 tablet by mouth 2 times daily 20  Yes Nash Ivory MD   furosemide (LASIX) 40 MG tablet Take 1 tablet by mouth 2 times daily 20  Yes Nash Ivory MD   spironolactone (ALDACTONE) 25 MG tablet Take 1 tablet by mouth daily 20  Yes Nash Ivory MD   aspirin 81 MG chewable tablet Take 1 tablet by mouth daily 20  Yes Nash Ivory MD   FLUoxetine (PROZAC) 20 MG capsule Take 40 mg by mouth daily  3/24/20  Yes Historical Provider, MD   atorvastatin (LIPITOR) 80 MG tablet Take 80 mg by mouth daily 20  Yes Historical Provider, MD   buPROPion [facility]) 150 MG extended release tablet Take 150 mg by mouth 2 times daily 20  Yes Historical Provider, MD   levothyroxine (SYNTHROID) 75 MCG tablet Take 75 mcg by mouth daily 19  Yes Historical Provider, MD   traZODone (DESYREL) 50 MG tablet Take 25-75 mg by mouth nightly as needed for Sleep 3/24/20  Yes Historical Provider, MD     Social History     Social History     Tobacco History     Smoking Status  Never Smoker    Smokeless Tobacco Use  Never Used          Alcohol History     Alcohol Use Status  Not Asked          Drug Use     Drug Use Status  Not Asked          Sexual Activity     Sexually Active  Not Asked              Family History   No family history on file.   Physical Exam     Vitals: /84   Pulse 120   Temp 97.6 °F (36.4 °C) (Oral)   Resp 16   Ht 6' 6\" (1.981 m)   Wt 204 lb 5.9 oz (92.7 kg)   SpO2 95%   BMI 23.62 kg/m²   Tmax: Temp (24hrs), Av.7 °F (36.5 °C), Min:97.5 °F (36.4 °C), Max:98.1 °F (36.7 °C)    Last Body weight:   Wt Readings from Last 3 Encounters:   09/20/20 204 lb 5.9 oz (92.7 kg)   04/28/20 209 lb 7 oz (95 kg)   04/20/20 209 lb 14.1 oz (95.2 kg)     Body Mass Index : Body mass index is 23.62 kg/m². Physical Exam  Vitals signs reviewed. Constitutional:       General: He is not in acute distress. Appearance: Normal appearance. He is normal weight. He is not ill-appearing, toxic-appearing or diaphoretic. HENT:      Head: Normocephalic and atraumatic. Mouth/Throat:      Mouth: Mucous membranes are moist.   Eyes:      Extraocular Movements: Extraocular movements intact. Pupils: Pupils are equal, round, and reactive to light. Neck:      Musculoskeletal: Normal range of motion and neck supple. No neck rigidity. Cardiovascular:      Rate and Rhythm: Normal rate. Rhythm irregular. Heart sounds: No murmur. Pulmonary:      Effort: Pulmonary effort is normal. No respiratory distress. Breath sounds: No wheezing or rhonchi. Abdominal:      General: Bowel sounds are normal. There is no distension. Palpations: Abdomen is soft. Musculoskeletal: Normal range of motion. General: No deformity. Skin:     General: Skin is warm and dry. Neurological:      General: No focal deficit present. Mental Status: He is alert.           Investigations   Laboratory Testing:     Recent Results (from the past 24 hour(s))   POC Glucose Fingerstick    Collection Time: 09/19/20  7:42 PM   Result Value Ref Range    POC Glucose 109 75 - 110 mg/dL   TSH without Reflex    Collection Time: 09/19/20  8:19 PM   Result Value Ref Range    TSH 6.59 (H) 0.30 - 5.00 mIU/L   Basic Metabolic Panel    Collection Time: 09/19/20  8:19 PM   Result Value Ref Range    Glucose 113 (H) 70 - 99 mg/dL    BUN 23 8 - 23 mg/dL    CREATININE 1.31 (H) 0.70 - 1.20 mg/dL    Bun/Cre Ratio NOT REPORTED 9 - 20    Calcium 8.6 8.6 - 10.4 mg/dL    Sodium 139 135 - 144 mmol/L    Potassium 4. 2 3.7 - 5.3 mmol/L    Chloride 102 98 - 107 mmol/L    CO2 28 20 - 31 mmol/L    Anion Gap 9 9 - 17 mmol/L    GFR Non-African American 52 (L) >60 mL/min    GFR African American >60 >60 mL/min    GFR Comment          GFR Staging NOT REPORTED    CBC Auto Differential    Collection Time: 09/19/20  8:19 PM   Result Value Ref Range    WBC 6.6 3.5 - 11.0 k/uL    RBC 4.06 (L) 4.5 - 5.9 m/uL    Hemoglobin 13.0 (L) 13.5 - 17.5 g/dL    Hematocrit 37.7 (L) 41 - 53 %    MCV 92.8 80 - 100 fL    MCH 32.1 26 - 34 pg    MCHC 34.6 31 - 37 g/dL    RDW 14.7 11.5 - 14.9 %    Platelets 072 624 - 200 k/uL    MPV 7.8 6.0 - 12.0 fL    NRBC Automated NOT REPORTED per 100 WBC    Differential Type NOT REPORTED     Seg Neutrophils 77 (H) 36 - 66 %    Lymphocytes 12 (L) 24 - 44 %    Monocytes 8 (H) 1 - 7 %    Eosinophils % 2 0 - 4 %    Basophils 1 0 - 2 %    Immature Granulocytes NOT REPORTED 0 %    Segs Absolute 5.10 1.3 - 9.1 k/uL    Absolute Lymph # 0.80 (L) 1.0 - 4.8 k/uL    Absolute Mono # 0.50 0.1 - 1.3 k/uL    Absolute Eos # 0.10 0.0 - 0.4 k/uL    Basophils Absolute 0.10 0.0 - 0.2 k/uL    Absolute Immature Granulocyte NOT REPORTED 0.00 - 0.30 k/uL    WBC Morphology NOT REPORTED     RBC Morphology NOT REPORTED     Platelet Estimate NOT REPORTED    T4, Free    Collection Time: 09/19/20  8:19 PM   Result Value Ref Range    Thyroxine, Free 1.12 0.93 - 1.70 ng/dL   Troponin    Collection Time: 09/19/20  8:19 PM   Result Value Ref Range    Troponin, High Sensitivity 34 (H) 0 - 22 ng/L    Troponin T NOT REPORTED <0.03 ng/mL    Troponin Interp NOT REPORTED    Brain Natriuretic Peptide    Collection Time: 09/19/20  8:19 PM   Result Value Ref Range    Pro-BNP 3,746 (H) <300 pg/mL    BNP Interpretation Pro-BNP Reference Range:    EKG 12 Lead    Collection Time: 09/19/20  8:19 PM   Result Value Ref Range    Ventricular Rate 115 BPM    Atrial Rate 82 BPM    QRS Duration 158 ms    Q-T Interval 404 ms    QTc Calculation (Bazett) 558 ms    R Axis -53 EF. Not in acute exacerbation  · Type 2 MI - troponins flat. Will repeat one more troponin    DVT ppx: contraindicated as patient on Eliquis  Diet: Cardiac    Betsy Uriarte MD  PGY-2, Internal Medicine Resident  [facility] [place]         9/20/2020, 11:27 AM    Attestation and add on       I have discussed the care of Sumit Nielsen , including pertinent history and exam findings,      9/20/20    with the resident. I have seen and examined the patient and the key elements of all parts of the encounter have been performed by me . I agree with the assessment, plan and orders as documented by the resident. Principal Problem:    Atrial fibrillation with RVR (HCC)  Active Problems:    LBBB (left bundle branch block)    Essential hypertension    Acquired hypothyroidism    Systolic heart failure (HCC)  Resolved Problems:    * No resolved hospital problems. *       Cardiology input noted  1. Paroxysmal atrial fibrillation with exacerbation which brought him in the hospital.  He had a rapid ventricular response. He had no chest pain. Now feels better    2. Recent nuclear study showing ejection fraction 42% with no significant ischemia   3. Chronic systolic heart failure no significant congestive heart failure at this time. 4. Chronic left bundle-branch block   5. nonspecific troponin elevation type 2 MI probably secondary to increased heart rate        Increase metoprolol  continue with anticoagulation  if the patient is up in heart rate is reasonable  and the patient is asymptomatic he could be sent home with usual follow-up as a follow-up on October 1st with us        ---- ;        Medications:      Allergies:  No Known Allergies    Current Meds:   Scheduled Meds:    metoprolol succinate  50 mg Oral BID    sodium chloride flush  10 mL Intravenous 2 times per day    apixaban  5 mg Oral BID    aspirin  81 mg Oral Daily    buPROPion  150 mg Oral BID    FLUoxetine  40 mg Oral Daily    levothyroxine  75 mcg Oral Daily    spironolactone  25 mg Oral Daily    atorvastatin  80 mg Oral Nightly    traZODone  50 mg Oral Nightly     Continuous Infusions:   PRN Meds: sodium chloride flush, acetaminophen        [facility] [address]    Phone [phone]   Fax: [phone]  Answering Service: [phone]

## 2020-09-20 NOTE — PROGRESS NOTES
Writer spoke to Dr. Maggie Goodman updating him on patients current heart rate range of the 80's-120's, stated he was okay with it considering he will get another dose of toprolol tonight.

## 2020-09-20 NOTE — ED NOTES
Mode of arrival (squad #, walk in, police, etc) :Pt. Arrived via [facility]        Chief complaint(s): Dizziness/weakness        Arrival Note (brief scenario, treatment PTA, etc).: Pt. States he was at home in the kitchen with his wife, when he began feeling dizzy and not well. Pt. States he was dx. With a-fib a few weeks ogo and he feels like he did prior to him being dx. With a fib. Pt. Is alert and oriented resting on cot in room. Pt. States he just feels tired now. Pt. Skin is pink warm and dry, no signs of respiratory distress. C= \"Have you ever felt that you should Cut down on your drinking? \"  No  A= \"Have people Annoyed you by criticizing your drinking? \"  No  G= \"Have you ever felt bad or Guilty about your drinking? \"  No  E= \"Have you ever had a drink as an Eye-opener first thing in the morning to steady your nerves or to help a hangover? \"  No      Deferred []      Reason for deferring: N/A    *If yes to two or more: probable alcohol abuse. Judith Leal RN  09/19/20 2001

## 2020-09-20 NOTE — CONSULTS
[address]  [address] [place] [address], [address] [address]  [phone]               Cardiology Consult           Date of Admission:  9/19/2020  Date of Consultation:  9/20/2020      PCP:  James James MD      Chief Complaint:  Weakness fatigue    History of Present Illness:  Mehul Horner is a 82 y.o. male who presents with  History of idiopathic cardiomyopathy and chronic systolic heart failure as where as paroxysmal atrial fibrillation. The patient was doing well but developed weakness and heart palpitations when he was standing and did not pass out but felt quite weak. He is better presently. He had no chest discomfort shortness breath. He had a recent stress test that showed no significant ischemia with prior infarcts with ejection fraction 42%. Electrophysiology has seen him and will get a follow-up with him in the office at a later date      PMH:   has a past medical history of A-fib [facility]), CVA (cerebral vascular accident) ([facility] [address] and Myocardial infarct, old. PSH:   has no past surgical history on file. Allergies:  No Known Allergies     Home Meds:    Prior to Admission medications    Medication Sig Start Date End Date Taking?  Authorizing Provider   metoprolol succinate (TOPROL XL) 25 MG extended release tablet Take 1 tablet by mouth daily  Patient taking differently: Take 25 mg by mouth daily Patient was only taking 12.5mg 4/29/20  Yes Roberto Javier MD   apixaban (ELIQUIS) 5 MG TABS tablet Take 1 tablet by mouth 2 times daily 4/20/20  Yes Nash Ivory MD   furosemide (LASIX) 40 MG tablet Take 1 tablet by mouth 2 times daily 4/20/20  Yes Nash Ivory MD   spironolactone (ALDACTONE) 25 MG tablet Take 1 tablet by mouth daily 4/21/20  Yes Nash Ivory MD   aspirin 81 MG chewable tablet Take 1 tablet by mouth daily 4/21/20  Yes Nash Ivory MD   FLUoxetine (PROZAC) 20 MG capsule Take 40 mg by mouth daily  3/24/20  Yes Historical Provider, MD atorvastatin (LIPITOR) 80 MG tablet Take 80 mg by mouth daily 1/1/20  Yes Historical Provider, MD   buPROPion [facility]) 150 MG extended release tablet Take 150 mg by mouth 2 times daily 2/16/20  Yes Historical Provider, MD   levothyroxine (SYNTHROID) 75 MCG tablet Take 75 mcg by mouth daily 8/23/19  Yes Historical Provider, MD   traZODone (DESYREL) 50 MG tablet Take 25-75 mg by mouth nightly as needed for Sleep 3/24/20  Yes Historical Provider, MD        Hospital Meds:    Current Facility-Administered Medications   Medication Dose Route Frequency Provider Last Rate Last Dose    metoprolol succinate (TOPROL XL) extended release tablet 50 mg  50 mg Oral BID Rubens Mcgill MD        sodium chloride flush 0.9 % injection 10 mL  10 mL Intravenous 2 times per day Tj Fan DO   10 mL at 09/20/20 8846    sodium chloride flush 0.9 % injection 10 mL  10 mL Intravenous PRN Matt Fan DO        acetaminophen (TYLENOL) tablet 650 mg  650 mg Oral Q4H PRN Matt Fan DO        apixaban Jone Sniff) tablet 5 mg  5 mg Oral BID Tj Fan DO   5 mg at 09/20/20 0920    aspirin chewable tablet 81 mg  81 mg Oral Daily Tj Fan DO   81 mg at 09/20/20 0920    buPROPion [facility]) extended release tablet 150 mg  150 mg Oral BID Matt Fan DO   150 mg at 09/20/20 0920    FLUoxetine (PROZAC) capsule 40 mg  40 mg Oral Daily Tj Fan DO   40 mg at 09/20/20 0920    levothyroxine (SYNTHROID) tablet 75 mcg  75 mcg Oral Daily Tj Fan DO        spironolactone (ALDACTONE) tablet 25 mg  25 mg Oral Daily Tj Fan DO   25 mg at 09/20/20 0920    atorvastatin (LIPITOR) tablet 80 mg  80 mg Oral Nightly Costa Frances MD   80 mg at 09/20/20 0011    traZODone (DESYREL) tablet 50 mg  50 mg Oral Nightly Costa Frances MD   50 mg at 09/20/20 0011       Social History:       TOBACCO:   reports that he has never smoked. He has never used smokeless tobacco.  ETOH:   has no history on file for alcohol.   DRUGS: tenderness/mass/nodules    Lungs: clear to auscultation bilaterally    Heart:  irregular    Abdomen: Soft, non-tender. Bowel sounds normal. No masses,  no organomegaly    Extremities: extremities normal, atraumatic, no cyanosis or edema    Skin: Skin color, texture, turgor normal. No rashes or lesions    Neurologic: Grossly normal            Labs:      CBC:   Recent Labs     09/19/20 2019 09/20/20  0945   WBC 6.6 8.4   HGB 13.0* 13.2*   HCT 37.7* 37.4*   MCV 92.8 93.1    200     BMP:   Recent Labs     09/19/20 2019 09/20/20  0945    140   K 4.2 3.5*    104   CO2 28 29   BUN 23 18   CREATININE 1.31* 1.22*     PT/INR: No results for input(s): PROTIME, INR in the last 72 hours. APTT: No results for input(s): APTT in the last 72 hours. MAG:   Recent Labs     09/20/20  0945   MG 2.3     D Dimer: No results for input(s): DDIMER in the last 72 hours. Troponin T   Recent Labs     09/19/20 2019 09/19/20  2225   TROPHS 34* 32*     ProBNP Invalid input(s): PRO-BNP          Diagnosis:  Principal Problem:    Atrial fibrillation with RVR (HCC)  Active Problems:    Acquired hypothyroidism  Resolved Problems:    * No resolved hospital problems. *         1. Paroxysmal atrial fibrillation with exacerbation which brought him in the hospital.  He had a rapid ventricular response. He had no chest pain. Now feels better    2. Recent nuclear study showing ejection fraction 42% with no significant ischemia   3. Chronic systolic heart failure no significant congestive heart failure at this time.      4. Chronic left bundle-branch block   5. nonspecific troponin elevation type 2 MI probably secondary to increased heart rate      Increase metoprolol  continue with anticoagulation  if the patient is up in heart rate is reasonable  and the patient is asymptomatic he could be sent home with usual follow-up as a follow-up on October 1st with us      Electronically signed by Jaja Rivero MD on 9/20/2020 at 11:19 AM

## 2020-09-20 NOTE — PROGRESS NOTES
Patient moved from 2084 to 2093 due to malfunctioning telemetry.      Electronically signed by Bradley Finn RN on 9/19/2020 at 11:46 PM

## 2020-09-20 NOTE — CARE COORDINATION
CASE MANAGEMENT NOTE:    Admission Date:  9/19/2020 Dennys Yepez is a 82 y.o.  male    Admitted for : Rapid atrial fibrillation ([facility] [address] [I48.91]    Met with:  Patient    PCP:  Dr. Claudia Little:  Medicare      Current Residence/ Living Arrangements:  independently at home             Current Services PTA:  No    Is patient agreeable to VNS: No    Freedom of choice provided:  Yes    List of [address] provided: No    VNS chosen:  No    DME:  Walker and shower bench    Home Oxygen: No    Nebulizer: No    CPAP/BIPAP: No    Supplier: N/A    Potential Assistance Needed: No    SNF needed: No    Freedom of choice and list provided: NA    Pharmacy:  [facility]       Does Patient want to use MEDS to BEDS? No    Is patient currently receiving oral anticoagulation therapy? Yes - Eliquis PTA    Is the Patient an [facility] with Readmission Risk Score greater than 14%? No  If yes, pt needs a follow up appointment made within 7 days. Family Members/Caregivers that pt would like involved in their care:    Yes    If yes, list name here: Wife Delmi Bautista    Transportation Provider:  Patient             Is patient in Isolation/One on One/Altered Mental Status? No  If yes, skip next question. If no, would they like an I-Pad to  use? No  If yes, call [phone]. Discharge Plan:  9/20: MEDICARE - Patient is from 1-story home with wife. He is independent and drives. DME - walker, shower bench. Has had VNS [place] in past - Does not want this time. Eliquis PTA. Cardio making med adjustments.  //SHAILA                 Electronically signed by: Abdi Woodard RN on 9/20/2020 at 4:28 PM

## 2020-09-21 PROCEDURE — 6370000000 HC RX 637 (ALT 250 FOR IP): Performed by: NURSE PRACTITIONER

## 2020-09-21 PROCEDURE — 6370000000 HC RX 637 (ALT 250 FOR IP): Performed by: INTERNAL MEDICINE

## 2020-09-21 PROCEDURE — 99232 SBSQ HOSP IP/OBS MODERATE 35: CPT | Performed by: INTERNAL MEDICINE

## 2020-09-21 PROCEDURE — 6370000000 HC RX 637 (ALT 250 FOR IP): Performed by: GENERAL PRACTICE

## 2020-09-21 PROCEDURE — 2580000003 HC RX 258: Performed by: GENERAL PRACTICE

## 2020-09-21 PROCEDURE — 2060000000 HC ICU INTERMEDIATE R&B

## 2020-09-21 RX ORDER — METOPROLOL SUCCINATE 100 MG/1
100 TABLET, EXTENDED RELEASE ORAL 2 TIMES DAILY
Status: DISCONTINUED | OUTPATIENT
Start: 2020-09-21 | End: 2020-09-25 | Stop reason: HOSPADM

## 2020-09-21 RX ORDER — DIGOXIN 125 MCG
125 TABLET ORAL DAILY
Status: DISCONTINUED | OUTPATIENT
Start: 2020-09-22 | End: 2020-09-22

## 2020-09-21 RX ORDER — DIGOXIN 250 MCG
250 TABLET ORAL ONCE
Status: COMPLETED | OUTPATIENT
Start: 2020-09-21 | End: 2020-09-21

## 2020-09-21 RX ADMIN — SPIRONOLACTONE 25 MG: 25 TABLET, FILM COATED ORAL at 07:51

## 2020-09-21 RX ADMIN — METOPROLOL SUCCINATE 50 MG: 50 TABLET, EXTENDED RELEASE ORAL at 07:51

## 2020-09-21 RX ADMIN — ATORVASTATIN CALCIUM 80 MG: 80 TABLET, FILM COATED ORAL at 21:14

## 2020-09-21 RX ADMIN — Medication 10 ML: at 21:17

## 2020-09-21 RX ADMIN — ASPIRIN 81 MG CHEWABLE TABLET 81 MG: 81 TABLET CHEWABLE at 07:51

## 2020-09-21 RX ADMIN — METOPROLOL SUCCINATE 100 MG: 100 TABLET, EXTENDED RELEASE ORAL at 21:14

## 2020-09-21 RX ADMIN — FLUOXETINE HYDROCHLORIDE 40 MG: 20 CAPSULE ORAL at 07:51

## 2020-09-21 RX ADMIN — TRAZODONE HYDROCHLORIDE 50 MG: 50 TABLET ORAL at 21:14

## 2020-09-21 RX ADMIN — ACETAMINOPHEN 650 MG: 325 TABLET, FILM COATED ORAL at 07:51

## 2020-09-21 RX ADMIN — BUPROPION HYDROCHLORIDE 150 MG: 150 TABLET, EXTENDED RELEASE ORAL at 07:51

## 2020-09-21 RX ADMIN — BUPROPION HYDROCHLORIDE 150 MG: 150 TABLET, EXTENDED RELEASE ORAL at 21:14

## 2020-09-21 RX ADMIN — LEVOTHYROXINE SODIUM 75 MCG: 75 TABLET ORAL at 06:31

## 2020-09-21 RX ADMIN — APIXABAN 5 MG: 5 TABLET, FILM COATED ORAL at 07:52

## 2020-09-21 RX ADMIN — APIXABAN 5 MG: 5 TABLET, FILM COATED ORAL at 21:14

## 2020-09-21 RX ADMIN — POTASSIUM CHLORIDE 40 MEQ: 1500 TABLET, EXTENDED RELEASE ORAL at 11:15

## 2020-09-21 RX ADMIN — DIGOXIN 250 MCG: 250 TABLET ORAL at 11:15

## 2020-09-21 RX ADMIN — Medication 10 ML: at 07:53

## 2020-09-21 ASSESSMENT — PAIN SCALES - GENERAL
PAINLEVEL_OUTOF10: 0
PAINLEVEL_OUTOF10: 0
PAINLEVEL_OUTOF10: 3

## 2020-09-21 ASSESSMENT — ENCOUNTER SYMPTOMS
ABDOMINAL PAIN: 0
SHORTNESS OF BREATH: 1
VOMITING: 0
NAUSEA: 0
EYES NEGATIVE: 1
CHEST TIGHTNESS: 0

## 2020-09-21 ASSESSMENT — PAIN DESCRIPTION - LOCATION: LOCATION: BACK

## 2020-09-21 ASSESSMENT — PAIN DESCRIPTION - PAIN TYPE: TYPE: CHRONIC PAIN

## 2020-09-21 NOTE — PROGRESS NOTES
[facility] Internal Medicine    Progress Note     9/21/2020    9:52 AM    Name:   Sumit Nielsen  MRN:     [id number]     Acct:      [de-identified]   Room:   2093/2093-01   Day:  2  Admit Date:  9/19/2020  7:39 PM    PCP:   Waqar Chadwick MD  Code Status:  Full Code    Subjective:     C/C:   Chief Complaint   Patient presents with    Fatigue     Principal Problem:    Atrial fibrillation with RVR ([address]  Active Problems:    LBBB (left bundle branch block)    Essential hypertension    Acquired hypothyroidism    Systolic heart failure ([address]  Resolved Problems:    * No resolved hospital problems. *      Interval History Status: not changed. Significant last 24 hr data reviewed ;   Vitals:    09/21/20 0100 09/21/20 0230 09/21/20 0330 09/21/20 0656   BP: (!) 118/90 (!) 140/109 137/89 (!) 122/92   Pulse: 99 114 111 96   Resp: 14 14 14 18   Temp: 97.5 °F (36.4 °C) 97 °F (36.1 °C) 97.3 °F (36.3 °C) 98 °F (36.7 °C)   TempSrc: Oral Oral Oral Oral   SpO2: 96% 95% 95% 97%   Weight:    205 lb 7.5 oz (93.2 kg)   Height:          No results found for this or any previous visit (from the past 24 hour(s)). Recent Labs     09/19/20 1942   POCGLU 109        Xr Chest Portable    Result Date: 9/19/2020  EXAMINATION: ONE XRAY VIEW OF THE CHEST 9/19/2020 8:16 pm COMPARISON: April 27, 2020 HISTORY: ORDERING SYSTEM PROVIDED HISTORY: sob TECHNOLOGIST PROVIDED HISTORY: sob Reason for Exam: Pt states SOB and no other chest complaints Acuity: Unknown Type of Exam: Initial Mechanism of Injury: Pt states SOB and no other chest complaints FINDINGS: The lungs are without acute focal process. There is no effusion or pneumothorax. The cardiomediastinal silhouette is without acute process. The osseous structures are without acute process. No acute process.              HPI:   See history in H and P      Review of Systems:     Constitutional:  negative for chills, fevers, sweats  Respiratory:  negative for cough, dyspnea on exertion, hemoptysis, shortness of breath, wheezing  Cardiovascular: Positive for palpitation positive for leg gave out yesterday had to sit on the floor  Gastrointestinal:  negative for abdominal pain, constipation, diarrhea, nausea, vomiting  Neurological:  negative for dizziness, headache  Data:     Past Medical History:  no change     Social History:  no change    Family History: @no change    Vitals:      I/O (24Hr):     Intake/Output Summary (Last 24 hours) at 9/21/2020 4530  Last data filed at 9/21/2020 7798  Gross per 24 hour   Intake 360 ml   Output 550 ml   Net -190 ml       Labs:    URINE ANALYSIS: No results found for: LABURIN     CBC:  Lab Results   Component Value Date    WBC 8.4 09/20/2020    HGB 13.2 09/20/2020     09/20/2020        BMP:    Lab Results   Component Value Date     09/20/2020    K 3.5 09/20/2020     09/20/2020    CO2 29 09/20/2020    BUN 18 09/20/2020    CREATININE 1.22 09/20/2020    GLUCOSE 148 09/20/2020      LIVER PROFILE:  Lab Results   Component Value Date    ALT 22 04/27/2020    AST 22 04/27/2020    PROT 6.3 04/27/2020    BILITOT 0.83 04/27/2020    LABALBU 3.5 04/27/2020               Radiology:      Physical Examination:        General appearance:  alert, cooperative and no distress  Mental Status:  oriented to person, place and time and normal affect  Lungs:  clear to auscultation bilaterally, normal effort  Heart: Irregularly irregular  tachycardia  Abdomen:  soft, nontender, nondistended, normal bowel sounds, no masses, hepatomegaly, splenomegaly  Extremities:  no edema, redness, tenderness in the calves  Skin:  no gross lesions, rashes, induration    Assessment:        Primary Problem  Atrial fibrillation with RVR [facility])    Active Hospital Problems    Diagnosis Date Noted    Systolic heart failure ([facility] [address] [I50.20] 04/27/2020    Acquired hypothyroidism [E03.9] 04/16/2020    Essential hypertension [I10] 04/16/2020    LBBB (left bundle branch block) [I44.7] 04/16/2020    Atrial fibrillation with RVR ([facility] [address] [I48.91] 04/15/2020       Plan:        1. A. fib with rapid ventricle response rate is not controlled Toprol dose increased 200 twice a day will add low-dose digoxin  2. Chronic systolic congestive heart failure compensated  3. Type II NSTEMI troponin leak secondary to A. fib with RVR  4. Medications:      Allergies:  No Known Allergies    Current Meds:   Scheduled Meds:    metoprolol succinate  100 mg Oral BID    sodium chloride flush  10 mL Intravenous 2 times per day    apixaban  5 mg Oral BID    aspirin  81 mg Oral Daily    buPROPion  150 mg Oral BID    FLUoxetine  40 mg Oral Daily    levothyroxine  75 mcg Oral Daily    spironolactone  25 mg Oral Daily    atorvastatin  80 mg Oral Nightly    traZODone  50 mg Oral Nightly     Continuous Infusions:   PRN Meds: potassium chloride **OR** potassium alternative oral replacement **OR** potassium chloride, sodium chloride flush, acetaminophen       Nighat Paulino MD  9/21/2020  9:52 AM

## 2020-09-21 NOTE — PROGRESS NOTES
RN entered room during hourly rounding to find patient sitting on floor by bed. Non-skid socks on. Patient states he had came out of bathroom to go back to bed and his right knee became uneasy. RN assessed patient; VS obtained. No s/sx distress noted. RN to call Dr. Cherylene Gip.

## 2020-09-21 NOTE — PLAN OF CARE
Problem: Falls - Risk of:  Goal: Will remain free from falls  Description: Will remain free from falls  Outcome: Met This Shift  Goal: Absence of physical injury  Description: Absence of physical injury  Outcome: Met This Shift     Problem: Cardiac:  Goal: Complications related to the disease process, condition or treatment will be avoided or minimized  Description: Complications related to the disease process, condition or treatment will be avoided or minimized  Outcome: Met This Shift     Problem: Coping:  Goal: Level of anxiety will decrease  Description: Level of anxiety will decrease  Outcome: Met This Shift  Goal: General experience of comfort will improve  Description: General experience of comfort will improve  Outcome: Met This Shift     Problem: Safety:  Goal: Will show no signs and symptoms of excessive bleeding  Description: Will show no signs and symptoms of excessive bleeding  Outcome: Met This Shift     Problem:  Activity:  Goal: Ability to tolerate increased activity will improve  Description: Ability to tolerate increased activity will improve  Outcome: Ongoing  Goal: Expression of feelings of increased energy will increase  Description: Expression of feelings of increased energy will increase  Outcome: Ongoing     Problem: Cardiac:  Goal: Ability to maintain an adequate cardiac output will improve  Description: Ability to maintain an adequate cardiac output will improve  Outcome: Ongoing     Problem: Health Behavior:  Goal: Ability to manage health-related needs will improve  Description: Ability to manage health-related needs will improve  Outcome: Ongoing     Problem: Safety:  Goal: Ability to remain free from injury will improve  Description: Ability to remain free from injury will improve  Outcome: Ongoing

## 2020-09-21 NOTE — CARE COORDINATION
DISCHARGE PLANNING NOTE:    Plan is for this patient to return to home. Referral made to [facility] per cardiology. Making med adjustments. Possible discharge tomorrow.      Electronically signed by Silvia Quiroga RN on 9/21/2020 at 11:54 AM

## 2020-09-21 NOTE — PROGRESS NOTES
Progress Note    Patient Name:  Nanette Agarwal    :  1935 8:04 AM      SUBJECTIVE       Mr. Onesimo Eller  has no chest pain, shortness of breath, palpitations, nausea or vomiting      OBJECTIVE     Vital signs:    /89   Pulse 111   Temp 97.3 °F (36.3 °C) (Oral)   Resp 14   Ht 6' 6\" (1.981 m)   Wt 204 lb 5.9 oz (92.7 kg)   SpO2 95%   BMI 23.62 kg/m²         Admit Weight:  205 lb (93 kg)    Last 3 weights: Wt Readings from Last 3 Encounters:   20 204 lb 5.9 oz (92.7 kg)   20 209 lb 7 oz (95 kg)   20 209 lb 14.1 oz (95.2 kg)       BMI: Body mass index is 23.62 kg/m². Input/Output:       Intake/Output Summary (Last 24 hours) at 2020 0804  Last data filed at 2020 4021  Gross per 24 hour   Intake 360 ml   Output 550 ml   Net -190 ml         Exam:     General appearance: awake and alert moves all ext   Lungs: no rhonchi, no wheezes, no rales  Heart: irregularly irregular  Abdomen: positive bowel sounds, no bruits, no masses  Extremities: warm and dry, no cyanosis, no clubbing        Laboratory Studies:     CBC:   Recent Labs     20  0945   WBC 6.6 8.4   HGB 13.0* 13.2*   HCT 37.7* 37.4*   MCV 92.8 93.1    200     BMP:   Recent Labs     20  0945    140   K 4.2 3.5*    104   CO2 28 29   BUN 23 18   CREATININE 1.31* 1.22*     PT/INR: No results for input(s): PROTIME, INR in the last 72 hours. APTT: No results for input(s): APTT in the last 72 hours. MAG:   Recent Labs     20  0945   MG 2.3     D Dimer: No results for input(s): DDIMER in the last 72 hours. Troponin  No results for input(s): TROPONINI in the last 72 hours. Recent Labs     09/1920  0945   TROPONINT NOT REPORTED NOT REPORTED NOT REPORTED      BNP No results for input(s): BNP in the last 72 hours. Recent Labs     20   PROBNP 3,746*         Pulse Ox:  SpO2  Av.8 %  Min: 95 %  Max: 97 %  Supplemental O2:       Current Meds:    metoprolol succinate  50 mg Oral BID    sodium chloride flush  10 mL Intravenous 2 times per day    apixaban  5 mg Oral BID    aspirin  81 mg Oral Daily    buPROPion  150 mg Oral BID    FLUoxetine  40 mg Oral Daily    levothyroxine  75 mcg Oral Daily    spironolactone  25 mg Oral Daily    atorvastatin  80 mg Oral Nightly    traZODone  50 mg Oral Nightly     Continuous Infusions:          ASSESSMENT     Principal Problem:    Atrial fibrillation with RVR ([facility])  Active Problems:    LBBB (left bundle branch block)    Essential hypertension    Acquired hypothyroidism    Systolic heart failure ([facility])  Resolved Problems:    * No resolved hospital problems. *      PLAN     1. Paroxysmal atrial fibrillation  -patient was found to be in RVR on presentation  -still heart rates in the 120s  -increasing toprol to 100mg twice a day  -asymptomatic    2. Chronic systolic heart failure  -recent TTE 4/2020 showed LV function of 25%, recent stress showed LV function of 42%  -creatinine today 1.3  -will need optimization of heart failure medications prior to discharge  -possible referral to CHF clinic  -compensated on exam.     3.  Nonspecific troponin elevation  -Type 2, most likely secondary to increased heart rate  -denies any type of chest pain or anginal symptoms  -recent negative stress testing    4. Chronic left bundle-branch block      Increase metoprolol. Continue Eliquis for anticoagulation. Patient is asymptomatic with heart rates. Will need optimization of heart failure medications once heart rate is better controlled.

## 2020-09-22 PROCEDURE — 2580000003 HC RX 258: Performed by: GENERAL PRACTICE

## 2020-09-22 PROCEDURE — 2060000000 HC ICU INTERMEDIATE R&B

## 2020-09-22 PROCEDURE — 6370000000 HC RX 637 (ALT 250 FOR IP): Performed by: INTERNAL MEDICINE

## 2020-09-22 PROCEDURE — 6370000000 HC RX 637 (ALT 250 FOR IP): Performed by: GENERAL PRACTICE

## 2020-09-22 PROCEDURE — 97162 PT EVAL MOD COMPLEX 30 MIN: CPT

## 2020-09-22 PROCEDURE — 6370000000 HC RX 637 (ALT 250 FOR IP): Performed by: NURSE PRACTITIONER

## 2020-09-22 PROCEDURE — 99232 SBSQ HOSP IP/OBS MODERATE 35: CPT | Performed by: INTERNAL MEDICINE

## 2020-09-22 RX ORDER — DIGOXIN 250 MCG
250 TABLET ORAL DAILY
Status: DISCONTINUED | OUTPATIENT
Start: 2020-09-22 | End: 2020-09-25

## 2020-09-22 RX ORDER — DIGOXIN 0.25 MG/ML
125 INJECTION INTRAMUSCULAR; INTRAVENOUS DAILY
Status: CANCELLED | OUTPATIENT
Start: 2020-09-22

## 2020-09-22 RX ADMIN — Medication 10 ML: at 08:40

## 2020-09-22 RX ADMIN — ASPIRIN 81 MG CHEWABLE TABLET 81 MG: 81 TABLET CHEWABLE at 08:38

## 2020-09-22 RX ADMIN — BUPROPION HYDROCHLORIDE 150 MG: 150 TABLET, EXTENDED RELEASE ORAL at 21:42

## 2020-09-22 RX ADMIN — SPIRONOLACTONE 25 MG: 25 TABLET, FILM COATED ORAL at 08:39

## 2020-09-22 RX ADMIN — TRAZODONE HYDROCHLORIDE 50 MG: 50 TABLET ORAL at 21:42

## 2020-09-22 RX ADMIN — ATORVASTATIN CALCIUM 80 MG: 80 TABLET, FILM COATED ORAL at 21:42

## 2020-09-22 RX ADMIN — DIGOXIN 250 MCG: 250 TABLET ORAL at 08:39

## 2020-09-22 RX ADMIN — LEVOTHYROXINE SODIUM 75 MCG: 75 TABLET ORAL at 06:21

## 2020-09-22 RX ADMIN — BUPROPION HYDROCHLORIDE 150 MG: 150 TABLET, EXTENDED RELEASE ORAL at 08:39

## 2020-09-22 RX ADMIN — APIXABAN 5 MG: 5 TABLET, FILM COATED ORAL at 08:39

## 2020-09-22 RX ADMIN — APIXABAN 5 MG: 5 TABLET, FILM COATED ORAL at 21:42

## 2020-09-22 RX ADMIN — FLUOXETINE HYDROCHLORIDE 40 MG: 20 CAPSULE ORAL at 08:39

## 2020-09-22 RX ADMIN — METOPROLOL SUCCINATE 100 MG: 100 TABLET, EXTENDED RELEASE ORAL at 08:39

## 2020-09-22 ASSESSMENT — PAIN SCALES - GENERAL
PAINLEVEL_OUTOF10: 0

## 2020-09-22 ASSESSMENT — PAIN DESCRIPTION - PAIN TYPE: TYPE: CHRONIC PAIN

## 2020-09-22 ASSESSMENT — PAIN DESCRIPTION - PROGRESSION: CLINICAL_PROGRESSION: NOT CHANGED

## 2020-09-22 ASSESSMENT — PAIN DESCRIPTION - DESCRIPTORS: DESCRIPTORS: DISCOMFORT

## 2020-09-22 ASSESSMENT — PAIN DESCRIPTION - LOCATION: LOCATION: KNEE

## 2020-09-22 ASSESSMENT — PAIN DESCRIPTION - FREQUENCY: FREQUENCY: INTERMITTENT

## 2020-09-22 ASSESSMENT — PAIN - FUNCTIONAL ASSESSMENT: PAIN_FUNCTIONAL_ASSESSMENT: ACTIVITIES ARE NOT PREVENTED

## 2020-09-22 NOTE — PROGRESS NOTES
Physical Therapy    Facility/Department: [facility] PROGRESSIVE CARE  Initial Assessment    NAME: Maurice Osman  : 1935  MRN: [id number]    Date of Service: 2020    Discharge Recommendations:  Patient would benefit from continued therapy after discharge        Assessment   Body structures, Functions, Activity limitations: Decreased functional mobility ; Decreased balance;Decreased safe awareness;Decreased endurance;Decreased posture  Assessment: Pt demo's significantly impaired endurance d/t medical status. Pt is a high fall risk with significant balance deficits. Cont per POC to meet goals and increase safety. Treatment Diagnosis: impaired mobility d/t Afib with RVR  Specific instructions for Next Treatment: Check with RN prior to session, check vitals, attempt RW use  Prognosis: Fair  Decision Making: Medium Complexity  History: Maurice Osman is a 82 y.o. male who presents with sudden onset palpitations shortness of breath and fatigue and lightheadedness. This happened while he was standing in the kitchen, he became diaphoretic and states he almost passed out, he walked back to his bedroom and laid down and the symptoms subsided over the course of 1 hour. EMS was called and symptoms have \"almost entirely subsided\", except for some generalized fatigue. Patient was diagnosed with A. fib with RVR approximately 5 weeks ago at [address], placed on metoprolol 12.5 mg extended release twice daily along with Eliquis. Patient also has longstanding history of congestive heart failure with ejection fraction of 40%. He states he has been taking all his medications as prescribed no increased swelling of his lower extremities. Prior to onset of symptoms he had been feeling fine.   He denies chest pain at any point during this episode  Exam: ROM, MMT, sensation, balance, social hx, mobiltiy, vitals  Clinical Presentation: faitgued after session  PT Education: PT Role;Plan of Care;General Safety;Precautions  Barriers to Learning: none  REQUIRES PT FOLLOW UP: Yes  Activity Tolerance  Activity Tolerance: Treatment limited secondary to medical complications (free text)  Activity Tolerance: Vitals taken after session d/t dizziness: HR , /105, SpO2 94%. Student nurse and Essie Norman RN immediately notified. Encouraged student nurse and RN to use urinal rather than ambulate to bathroom d/t symptoms, vitals, and fall risk       Patient Diagnosis(es): The primary encounter diagnosis was Atrial fibrillation with RVR ([facility] A diagnosis of Systolic heart failure, unspecified HF chronicity (HCC) was also pertinent to this visit. has a past medical history of A-fib [facility]), CVA (cerebral vascular accident) ([facility] [facility] and Myocardial infarct, old.   has no past surgical history on file. Restrictions  Restrictions/Precautions  Restrictions/Precautions: Weight Bearing, Fall Risk, Up as Tolerated  Required Braces or Orthoses?: Yes(uses R knee brace intermittently but states it does not help with stability or pain)  Upper Extremity Weight Bearing Restrictions  Other: Pt denies having wt bearing precautions from R knee scope to repair meniscus a few months ago  Position Activity Restriction  Other position/activity restrictions: Monitor vitals d/t A fib with RVR  Vision/Hearing  Vision: Impaired  Vision Exceptions: Wears glasses at all times  Hearing: Within functional limits     Subjective  General  Chart Reviewed: Yes  Patient assessed for rehabilitation services?: Yes  Additional Pertinent Hx: Pt presents from home with sudden SOB, lightheadedness, diaphoresis in standing position. EKG: A fib with RVR and L bundle branch block, unchanged from prior EKG. Pt has elevated troponin d/t elevated HR, dx with NSTEMI.  Additional PMH: EF 25-42%, cardiomyopathy, CHF  Response To Previous Treatment: Not applicable  Family / Caregiver Present: No  Diagnosis: A fib with RVR  Follows Commands: Within Functional Limits  Other (Comment): Essie Norman RN ok's session. States pt is wanting to get OOB, but has been significantly unsteady. Subjective  Subjective: Pt is resting in bed, no distress noted. Pt is agreeable to PT eval.  Pain Screening  Patient Currently in Pain: No  Pain Assessment  Pain Assessment: 0-10  Pain Level: (mild R knee pain at rest, unrated)  Patient's Stated Pain Goal: No pain  Pain Type: Chronic pain  Pain Location: Knee  Pain Descriptors: Discomfort  Pain Frequency: Intermittent  Clinical Progression: Not changed  Functional Pain Assessment: Activities are not prevented  Non-Pharmaceutical Pain Intervention(s): Declines  Multiple Pain Sites: No  Vital Signs  Patient Currently in Pain: Yes  Pre Treatment Pain Screening  Intervention List: Patient able to continue with treatment    Orientation  Orientation  Overall Orientation Status: Within Functional Limits  Social/Functional History  Social/Functional History  Lives With: Spouse  Type of Home: House  Home Layout: One level  Home Access: Stairs to enter without rails  Entrance Stairs - Number of Steps: 2  Bathroom Shower/Tub: Tub/Shower unit  Bathroom Toilet: Handicap height  Bathroom Equipment: Grab bars in shower, Shower chair(small bathroom, pt uses vanity to assist getting on and off toilet)  Bathroom Accessibility: Not accessible  Home Equipment: Rolling walker  Receives Help From: (was receiving [place] [id number] for a few weeks after prior hospitalization a few weeks ago. 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   Cognition        Objective

## 2020-09-22 NOTE — PLAN OF CARE
Problem: Falls - Risk of:  Goal: Will remain free from falls  Description: Will remain free from falls  9/22/2020 1647 by Fina Manuel RN  Outcome: Ongoing  9/22/2020 0318 by Maura Simental RN  Outcome: Ongoing  Note: Patient remained free from falls. Call light within reach. Goal: Absence of physical injury  Description: Absence of physical injury  9/22/2020 1647 by Fina Manuel RN  Outcome: Ongoing  9/22/2020 0318 by Maura Simental RN  Outcome: Ongoing  Note: No physical injury this shift. Problem: Activity:  Goal: Ability to tolerate increased activity will improve  Description: Ability to tolerate increased activity will improve  Outcome: Ongoing  Goal: Expression of feelings of increased energy will increase  Description: Expression of feelings of increased energy will increase  Outcome: Ongoing     Problem: Cardiac:  Goal: Ability to maintain an adequate cardiac output will improve  Description: Ability to maintain an adequate cardiac output will improve  Outcome: Ongoing  Goal: Complications related to the disease process, condition or treatment will be avoided or minimized  Description: Complications related to the disease process, condition or treatment will be avoided or minimized  Outcome: Ongoing     Problem: Coping:  Goal: Level of anxiety will decrease  Description: Level of anxiety will decrease  9/22/2020 1647 by Fina Manuel RN  Outcome: Ongoing  9/22/2020 0318 by Maura Simental RN  Outcome: Ongoing  Note: No anxiety noted this shift. Goal: General experience of comfort will improve  Description: General experience of comfort will improve  9/22/2020 1647 by Fina Manuel RN  Outcome: Ongoing  9/22/2020 0318 by Maura Simental RN  Outcome: Ongoing  Note: Patient appears comfortable.       Problem: Health Behavior:  Goal: Ability to manage health-related needs will improve  Description: Ability to manage health-related needs will improve  Outcome: Ongoing     Problem: Safety:  Goal: Ability to remain free from injury will improve  Description: Ability to remain free from injury will improve  9/22/2020 1647 by Galen Levin RN  Outcome: Ongoing  9/22/2020 0318 by Solange Rashid RN  Outcome: Ongoing  Note: Patient remained free from injury this shift.    Goal: Will show no signs and symptoms of excessive bleeding  Description: Will show no signs and symptoms of excessive bleeding  Outcome: Ongoing

## 2020-09-22 NOTE — DISCHARGE INSTR - COC
Continuity of Care Form    Patient Name: Imelda Cervantes   :  1935  MRN:  [id number]    Admit date:  2020  Discharge date: 2020    Code Status Order: Full Code   Advance Directives:   Advance Care Flowsheet Documentation       Date/Time Healthcare Directive Type of Healthcare Directive Copy in [address] Agent's Name Healthcare Agent's Phone Number    20 2313  No, patient does not have an advance directive for healthcare treatment -- -- -- -- --            Admitting Physician:  Vika Reynoso MD  PCP: Zulma Travis MD    Discharging Nurse: Jen 25 Unit/Room#: 2093/2093-01  Discharging Unit Phone Number: [phone]    Emergency Contact:   Extended Emergency Contact Information  Primary Emergency Contact: Alex Yang  Address: [address] [place] [address], [address] [place] [address]  Home Phone: [phone]  Relation: Spouse  Secondary Emergency Contact: [facility], [address] Phone: [phone]  Relation: Child   needed? No    Past Surgical History:  No past surgical history on file. Immunization History: There is no immunization history on file for this patient.     Active Problems:  Patient Active Problem List   Diagnosis Code    Atrial fibrillation with RVR ([facility]) I48.91    LBBB (left bundle branch block) I44.7    Essential hypertension I10    H/O TIA (transient ischemic attack) and stroke Z86.73    NSTEMI (non-ST elevated myocardial infarction) ([facility] I21.4    Acquired hypothyroidism E03.9    Mild malnutrition ([facility] E44.1    Near syncope G32    Systolic heart failure ([facility]) I50.20    Atrial fibrillation, currently in sinus rhythm Z86.79    Symptomatic bradycardia R00.1       Isolation/Infection:   Isolation            No Isolation          Patient Infection Status       Infection Onset Added Last Indicated Last Indicated By Review Planned Expiration Resolved Resolved By    None active    Resolved    COVID-19 Rule Out 04/15/20 04/15/20 04/15/20 COVID-19 (Ordered)   04/16/20 Rule-Out Test Resulted            Nurse Assessment:  Last Vital Signs: BP (!) 126/94   Pulse 120   Temp 97.7 °F (36.5 °C) (Oral)   Resp 14   Ht 6' 6\" (1.981 m)   Wt 205 lb 7.5 oz (93.2 kg)   SpO2 94%   BMI 23.74 kg/m²     Last documented pain score (0-10 scale): Pain Level: 0  Last Weight:   Wt Readings from Last 1 Encounters:   09/21/20 205 lb 7.5 oz (93.2 kg)     Mental Status:  oriented and alert    IV Access:  - None    Nursing Mobility/ADLs:  Walking   Assisted  Transfer  Assisted  Bathing  Independent  Dressing  {P DME HLYO:[id number]}  Toileting  Independent  Feeding  Independent  Med Admin  Independent  Med Delivery   whole    Wound Care Documentation and Therapy:        Elimination:  Continence:   · Bowel: Yes  · Bladder: Yes  Urinary Catheter: None   Colostomy/Ileostomy/Ileal Conduit: No       Date of Last BM: 9/25/2020    Intake/Output Summary (Last 24 hours) at 9/22/2020 1310  Last data filed at 9/22/2020 0848  Gross per 24 hour   Intake 365 ml   Output 625 ml   Net -260 ml     I/O last 3 completed shifts: In: 425 [P.O.:425]  Out: 1060 [Urine:1060]    Safety Concerns:     None    Impairments/Disabilities:      None    Nutrition Therapy:  Current Nutrition Therapy:   - Oral Diet:  Cardiac    Routes of Feeding: Oral  Liquids: No Restrictions  Daily Fluid Restriction: no  Last Modified Barium Swallow with Video (Video Swallowing Test): not done    Treatments at the Time of Hospital Discharge:   Respiratory Treatments: none  Oxygen Therapy:  is not on home oxygen therapy. Ventilator:    - No ventilator support    Rehab Therapies: Physical Therapy and Occupational Therapy  Weight Bearing Status/Restrictions: No weight bearing restirctions  Other Medical Equipment (for information only, NOT a DME order):  walker and bath bench  Other Treatments: skilled Nursing assessment and monitoring, medication education.  Please refer patient to [facility], when discharged from your facility for home health services. Patient's personal belongings (please select all that are sent with patient):  Glasses    RN SIGNATURE:  Electronically signed by Niya Smiley RN on 9/25/20 at 2:38 PM EDT    CASE MANAGEMENT/SOCIAL WORK SECTION    Inpatient Status Date: 9/19/2020    Readmission Risk Assessment Score:  Readmission Risk              Risk of Unplanned Readmission:        16           Discharging to Facility/ Agency   [facility] [address]  Phone [phone]  Fax [phone]  Evening fax [phone]        Marquez Salas  P: [phone]  F: [phone]    Home health care agency's  to evaluate patient two weeks prior to discharge from home health to determine post-discharge services. / signature: Electronically signed by Hussein Woodson RN on 9/22/20 at 1:11 PM EDT    PHYSICIAN SECTION    Prognosis: Good    Condition at Discharge: Stable    Rehab Potential (if transferring to Rehab): Good    Recommended Labs or Other Treatments After Discharge:     Physician Certification: I certify the above information and transfer of Dennys Yepez  is necessary for the continuing treatment of the diagnosis listed and that he requires [facility] for less 30 days.      Update Admission H&P: No change in H&P    PHYSICIAN SIGNATURE:  Electronically signed by Will Arcos MD on 9/25/20 at 2:04 PM EDT

## 2020-09-22 NOTE — PLAN OF CARE
Problem: Falls - Risk of:  Goal: Will remain free from falls  Description: Will remain free from falls  9/22/2020 0318 by Hitesh Uribe RN  Outcome: Ongoing  Note: Patient remained free from falls. Call light within reach. Problem: Falls - Risk of:  Goal: Absence of physical injury  Description: Absence of physical injury  9/22/2020 0318 by Hitesh Uribe RN  Outcome: Ongoing  Note: No physical injury this shift. Problem: Coping:  Goal: Level of anxiety will decrease  Description: Level of anxiety will decrease  Outcome: Ongoing  Note: No anxiety noted this shift. Problem: Coping:  Goal: General experience of comfort will improve  Description: General experience of comfort will improve  Outcome: Ongoing  Note: Patient appears comfortable. Problem: Safety:  Goal: Ability to remain free from injury will improve  Description: Ability to remain free from injury will improve  Outcome: Ongoing  Note: Patient remained free from injury this shift.

## 2020-09-22 NOTE — PROGRESS NOTES
Progress Note    Patient Name:  Jaylen Urbano    :  1935 7:03 AM      SUBJECTIVE       Mr. Joycelyn Gutierres  has no chest pain, shortness of breath, palpitations, nausea or vomiting. OBJECTIVE     Vital signs:    BP (!) 126/94   Pulse 120   Temp 97.7 °F (36.5 °C) (Oral)   Resp 14   Ht 6' 6\" (1.981 m)   Wt 205 lb 7.5 oz (93.2 kg)   SpO2 94%   BMI 23.74 kg/m²  0 L/min      Admit Weight:  205 lb (93 kg)    Last 3 weights: Wt Readings from Last 3 Encounters:   20 205 lb 7.5 oz (93.2 kg)   20 209 lb 7 oz (95 kg)   20 209 lb 14.1 oz (95.2 kg)       BMI: Body mass index is 23.74 kg/m². Input/Output:       Intake/Output Summary (Last 24 hours) at 2020 0703  Last data filed at 2020 2313  Gross per 24 hour   Intake 425 ml   Output 1060 ml   Net -635 ml         Exam:     General appearance: awake and alert moves all ext   Lungs: no rhonchi, no wheezes, no rales  Heart: S1 and S2 no murmur  Abdomen: positive bowel sounds, no bruits, no masses  Extremities: warm and dry, no cyanosis, no clubbing        Laboratory Studies:     CBC:   Recent Labs     20  2019 20  0945   WBC 6.6 8.4   HGB 13.0* 13.2*   HCT 37.7* 37.4*   MCV 92.8 93.1    200     BMP:   Recent Labs     20  2019 20  0945    140   K 4.2 3.5*    104   CO2 28 29   BUN 23 18   CREATININE 1.31* 1.22*     PT/INR: No results for input(s): PROTIME, INR in the last 72 hours. APTT: No results for input(s): APTT in the last 72 hours. MAG:   Recent Labs     20  0945   MG 2.3     D Dimer: No results for input(s): DDIMER in the last 72 hours. Troponin  No results for input(s): TROPONINI in the last 72 hours. Recent Labs     20  2225 20  0945   TROPONINT NOT REPORTED NOT REPORTED NOT REPORTED      BNP No results for input(s): BNP in the last 72 hours. Recent Labs     20   PROBNP 3,746*         Pulse Ox:  SpO2  Avg:

## 2020-09-22 NOTE — PROGRESS NOTES
[place] [address] Internal Medicine    Progress Note     9/22/2020    1:05 PM    Name:   Parish Venegas  MRN:     [id number]     Acct:      [de-identified]   Room:   2093/2093-01  IP Day:  3  Admit Date:  9/19/2020  7:39 PM    PCP:   Bin Aguirre MD  Code Status:  Full Code    Subjective:     C/C:   Chief Complaint   Patient presents with    Fatigue     Principal Problem:    Atrial fibrillation with RVR ([address]  Active Problems:    LBBB (left bundle branch block)    Essential hypertension    Acquired hypothyroidism    Systolic heart failure ([address]  Resolved Problems:    * No resolved hospital problems. *      Interval History Status: not changed. Significant last 24 hr data reviewed ;   Vitals:    09/21/20 2000 09/22/20 0030 09/22/20 0645 09/22/20 0704   BP: (!) 138/105 (!) 136/111 (!) 126/94    Pulse: 123 121 120 120   Resp: 18 18 14    Temp: 99 °F (37.2 °C) 99.1 °F (37.3 °C) 97.7 °F (36.5 °C)    TempSrc: Axillary Axillary Oral    SpO2: 95% 95% 94%    Weight:       Height:          No results found for this or any previous visit (from the past 24 hour(s)). Recent Labs     09/19/20 1942   POCGLU 109        Xr Chest Portable    Result Date: 9/19/2020  EXAMINATION: ONE XRAY VIEW OF THE CHEST 9/19/2020 8:16 pm COMPARISON: April 27, 2020 HISTORY: ORDERING SYSTEM PROVIDED HISTORY: sob TECHNOLOGIST PROVIDED HISTORY: sob Reason for Exam: Pt states SOB and no other chest complaints Acuity: Unknown Type of Exam: Initial Mechanism of Injury: Pt states SOB and no other chest complaints FINDINGS: The lungs are without acute focal process. There is no effusion or pneumothorax. The cardiomediastinal silhouette is without acute process. The osseous structures are without acute process. No acute process.              HPI:   See history in H and P      Review of Systems:     Constitutional:  negative for chills, fevers, sweats  Respiratory:  negative for cough, dyspnea on exertion, hemoptysis, shortness of breath, wheezing  Cardiovascular: Positive for palpitation positive for leg gave out yesterday had to sit on the floor  Gastrointestinal:  negative for abdominal pain, constipation, diarrhea, nausea, vomiting  Neurological:  negative for dizziness, headache  Data:     Past Medical History:  no change     Social History:  no change    Family History: @no change    Vitals:      I/O (24Hr):     Intake/Output Summary (Last 24 hours) at 9/22/2020 1305  Last data filed at 9/22/2020 0848  Gross per 24 hour   Intake 365 ml   Output 625 ml   Net -260 ml       Labs:    URINE ANALYSIS: No results found for: LABURIN     CBC:  Lab Results   Component Value Date    WBC 8.4 09/20/2020    HGB 13.2 09/20/2020     09/20/2020        BMP:    Lab Results   Component Value Date     09/20/2020    K 3.5 09/20/2020     09/20/2020    CO2 29 09/20/2020    BUN 18 09/20/2020    CREATININE 1.22 09/20/2020    GLUCOSE 148 09/20/2020      LIVER PROFILE:  Lab Results   Component Value Date    ALT 22 04/27/2020    AST 22 04/27/2020    PROT 6.3 04/27/2020    BILITOT 0.83 04/27/2020    LABALBU 3.5 04/27/2020               Radiology:      Physical Examination:        General appearance:  alert, cooperative and no distress  Mental Status:  oriented to person, place and time and normal affect  Lungs:  clear to auscultation bilaterally, normal effort  Heart: Irregularly irregular  tachycardia  Abdomen:  soft, nontender, nondistended, normal bowel sounds, no masses, hepatomegaly, splenomegaly  Extremities:  no edema, redness, tenderness in the calves  Skin:  no gross lesions, rashes, induration    Assessment:        Primary Problem  Atrial fibrillation with RVR [facility])    Active Hospital Problems    Diagnosis Date Noted    Systolic heart failure ([address] [I50.20] 04/27/2020    Acquired hypothyroidism [E03.9] 04/16/2020    Essential hypertension [I10] 04/16/2020    LBBB (left bundle branch block) [I44.7] 04/16/2020  Atrial fibrillation with RVR ([facility] [I48.91] 04/15/2020       Plan:        1. A. fib with rapid ventricle response rate is not controlled Toprol dose increased 200 twice a day will add low-dose digoxin  2. Chronic systolic congestive heart failure compensated  3. Type II NSTEMI troponin leak secondary to A. fib with RVR  4. Rate not controlled digoxin increased to 250 mcg daily  5. Medications:      Allergies:  No Known Allergies    Current Meds:   Scheduled Meds:    digoxin  250 mcg Oral Daily    metoprolol succinate  100 mg Oral BID    sodium chloride flush  10 mL Intravenous 2 times per day    apixaban  5 mg Oral BID    aspirin  81 mg Oral Daily    buPROPion  150 mg Oral BID    FLUoxetine  40 mg Oral Daily    levothyroxine  75 mcg Oral Daily    spironolactone  25 mg Oral Daily    atorvastatin  80 mg Oral Nightly    traZODone  50 mg Oral Nightly     Continuous Infusions:   PRN Meds: potassium chloride **OR** potassium alternative oral replacement **OR** potassium chloride, sodium chloride flush, acetaminophen       Leland Muñoz MD  9/22/2020  1:05 PM

## 2020-09-23 LAB
ANION GAP SERPL CALCULATED.3IONS-SCNC: 8 MMOL/L (ref 9–17)
BUN BLDV-MCNC: 28 MG/DL (ref 8–23)
BUN/CREAT BLD: ABNORMAL (ref 9–20)
CALCIUM SERPL-MCNC: 8.7 MG/DL (ref 8.6–10.4)
CHLORIDE BLD-SCNC: 100 MMOL/L (ref 98–107)
CO2: 27 MMOL/L (ref 20–31)
CREAT SERPL-MCNC: 1.28 MG/DL (ref 0.7–1.2)
GFR AFRICAN AMERICAN: >60 ML/MIN
GFR NON-AFRICAN AMERICAN: 54 ML/MIN
GFR SERPL CREATININE-BSD FRML MDRD: ABNORMAL ML/MIN/{1.73_M2}
GFR SERPL CREATININE-BSD FRML MDRD: ABNORMAL ML/MIN/{1.73_M2}
GLUCOSE BLD-MCNC: 118 MG/DL (ref 70–99)
MAGNESIUM: 2.3 MG/DL (ref 1.6–2.6)
POTASSIUM SERPL-SCNC: 3.9 MMOL/L (ref 3.7–5.3)
SODIUM BLD-SCNC: 135 MMOL/L (ref 135–144)

## 2020-09-23 PROCEDURE — 2500000003 HC RX 250 WO HCPCS: Performed by: INTERNAL MEDICINE

## 2020-09-23 PROCEDURE — 6370000000 HC RX 637 (ALT 250 FOR IP): Performed by: GENERAL PRACTICE

## 2020-09-23 PROCEDURE — 6370000000 HC RX 637 (ALT 250 FOR IP): Performed by: NURSE PRACTITIONER

## 2020-09-23 PROCEDURE — 80048 BASIC METABOLIC PNL TOTAL CA: CPT

## 2020-09-23 PROCEDURE — 2060000000 HC ICU INTERMEDIATE R&B

## 2020-09-23 PROCEDURE — 83735 ASSAY OF MAGNESIUM: CPT

## 2020-09-23 PROCEDURE — 2580000003 HC RX 258: Performed by: GENERAL PRACTICE

## 2020-09-23 PROCEDURE — 6370000000 HC RX 637 (ALT 250 FOR IP): Performed by: INTERNAL MEDICINE

## 2020-09-23 PROCEDURE — 36415 COLL VENOUS BLD VENIPUNCTURE: CPT

## 2020-09-23 PROCEDURE — 99233 SBSQ HOSP IP/OBS HIGH 50: CPT | Performed by: INTERNAL MEDICINE

## 2020-09-23 RX ORDER — LANOLIN ALCOHOL/MO/W.PET/CERES
3 CREAM (GRAM) TOPICAL NIGHTLY PRN
Status: DISCONTINUED | OUTPATIENT
Start: 2020-09-23 | End: 2020-09-25 | Stop reason: HOSPADM

## 2020-09-23 RX ADMIN — ASPIRIN 81 MG CHEWABLE TABLET 81 MG: 81 TABLET CHEWABLE at 07:38

## 2020-09-23 RX ADMIN — AMIODARONE HYDROCHLORIDE 1 MG/MIN: 1.8 INJECTION, SOLUTION INTRAVENOUS at 12:53

## 2020-09-23 RX ADMIN — BUPROPION HYDROCHLORIDE 150 MG: 150 TABLET, EXTENDED RELEASE ORAL at 21:14

## 2020-09-23 RX ADMIN — ATORVASTATIN CALCIUM 80 MG: 80 TABLET, FILM COATED ORAL at 21:14

## 2020-09-23 RX ADMIN — LEVOTHYROXINE SODIUM 75 MCG: 75 TABLET ORAL at 07:38

## 2020-09-23 RX ADMIN — DIGOXIN 250 MCG: 250 TABLET ORAL at 07:39

## 2020-09-23 RX ADMIN — SPIRONOLACTONE 25 MG: 25 TABLET, FILM COATED ORAL at 07:38

## 2020-09-23 RX ADMIN — AMIODARONE HYDROCHLORIDE 150 MG: 1.5 INJECTION, SOLUTION INTRAVENOUS at 12:40

## 2020-09-23 RX ADMIN — FLUOXETINE HYDROCHLORIDE 40 MG: 20 CAPSULE ORAL at 07:38

## 2020-09-23 RX ADMIN — METOPROLOL SUCCINATE 100 MG: 100 TABLET, EXTENDED RELEASE ORAL at 07:38

## 2020-09-23 RX ADMIN — TRAZODONE HYDROCHLORIDE 50 MG: 50 TABLET ORAL at 21:14

## 2020-09-23 RX ADMIN — APIXABAN 5 MG: 5 TABLET, FILM COATED ORAL at 07:38

## 2020-09-23 RX ADMIN — BUPROPION HYDROCHLORIDE 150 MG: 150 TABLET, EXTENDED RELEASE ORAL at 07:38

## 2020-09-23 RX ADMIN — AMIODARONE HYDROCHLORIDE 0.5 MG/MIN: 1.8 INJECTION, SOLUTION INTRAVENOUS at 18:58

## 2020-09-23 RX ADMIN — APIXABAN 5 MG: 5 TABLET, FILM COATED ORAL at 21:15

## 2020-09-23 RX ADMIN — METOPROLOL SUCCINATE 100 MG: 100 TABLET, EXTENDED RELEASE ORAL at 21:14

## 2020-09-23 RX ADMIN — Medication 10 ML: at 07:46

## 2020-09-23 ASSESSMENT — PAIN SCALES - GENERAL
PAINLEVEL_OUTOF10: 0

## 2020-09-23 NOTE — PROGRESS NOTES
Progress Note    Patient Name:  Maurice Osman    :  1935 3:20 PM      SUBJECTIVE       Mr. Vasquez Leos  has no chest pain, shortness of breath, palpitations, nausea or vomiting      OBJECTIVE     Vital signs:    BP (!) 115/96   Pulse 122   Temp 97.8 °F (36.6 °C) (Oral)   Resp 18   Ht 6' 6\" (1.981 m)   Wt 201 lb 11.5 oz (91.5 kg)   SpO2 95%   BMI 23.31 kg/m²  0 L/min      Admit Weight:  205 lb (93 kg)    Last 3 weights: Wt Readings from Last 3 Encounters:   20 201 lb 11.5 oz (91.5 kg)   20 209 lb 7 oz (95 kg)   20 209 lb 14.1 oz (95.2 kg)       BMI: Body mass index is 23.31 kg/m². Input/Output:       Intake/Output Summary (Last 24 hours) at 2020 1520  Last data filed at 2020 0452  Gross per 24 hour   Intake 400 ml   Output 900 ml   Net -500 ml         Exam:     General appearance: awake and alert moves all ext   Lungs: no rhonchi, no wheezes, no rales  Heart: S1 and S2 no murmur  Abdomen: positive bowel sounds, no bruits, no masses  Extremities: warm and dry, no cyanosis, no clubbing        Laboratory Studies:     CBC: No results for input(s): WBC, HGB, HCT, MCV, PLT in the last 72 hours. BMP:   Recent Labs     20  1315      K 3.9      CO2 27   BUN 28*   CREATININE 1.28*     PT/INR: No results for input(s): PROTIME, INR in the last 72 hours. APTT: No results for input(s): APTT in the last 72 hours. MAG:   Recent Labs     20  1315   MG 2.3     D Dimer: No results for input(s): DDIMER in the last 72 hours. Troponin  No results for input(s): TROPONINI in the last 72 hours. No results for input(s): TROPONINT in the last 72 hours. BNP No results for input(s): BNP in the last 72 hours. No results for input(s): PROBNP in the last 72 hours. Pulse Ox:  SpO2  Av.5 %  Min: 90 %  Max: 95 %  Supplemental O2: O2 Flow Rate (L/min): 0 L/min     Current Meds:    digoxin  250 mcg Oral Daily    metoprolol succinate  100 mg Oral BID    sodium chloride flush  10 mL Intravenous 2 times per day    apixaban  5 mg Oral BID    aspirin  81 mg Oral Daily    buPROPion  150 mg Oral BID    FLUoxetine  40 mg Oral Daily    levothyroxine  75 mcg Oral Daily    spironolactone  25 mg Oral Daily    atorvastatin  80 mg Oral Nightly    traZODone  50 mg Oral Nightly     Continuous Infusions:    Amiodarone      Amiodarone 1 mg/min (09/23/20 1253)            ASSESSMENT     Principal Problem:    Atrial fibrillation with RVR ([facility])  Active Problems:    LBBB (left bundle branch block)    Essential hypertension    Acquired hypothyroidism    Systolic heart failure ([facility])  Resolved Problems:    * No resolved hospital problems. *      PLAN     1.  Paroxysmal atrial fibrillation  -patient was found to be in RVR on presentation  -refractory to increased toprol and digoxin   -amiodarone gtt started today  -asymptomatic  -heart rates now in the 90s  -NPO after midnight for possible DCCV tomorrow if HR remains elevated.      2.  Chronic systolic heart failure  -recent TTE 4/2020 showed LV function of 25%, recent stress showed LV function of 42%  -creatinine today 1.28  -will need optimization of heart failure medications prior to discharge  -possible referral to CHF clinic  -compensated on exam.   -once heart rate is controlled, will add entresto     3.  Nonspecific troponin elevation  -Type 2, most likely secondary to increased heart rate  -denies any type of chest pain or anginal symptoms  -recent negative stress testing     4.  Chronic left bundle-branch block      Patient reviewed with Dr. Corey Gregory, will keep NPO after midnight for possible DCCV tomorrow if remains tachycardic. Continue amiodarone gtt, electrolyte replacement and cardiac monitoring.

## 2020-09-23 NOTE — FLOWSHEET NOTE
09/23/20 1635   Encounter Summary   Services provided to: Patient   Referral/Consult From: Rounding   Complexity of Encounter Low   Length of Encounter 15 minutes   Spiritual/Hinduism   Type Spiritual support   Assessment Sleeping   Intervention Prayer   Outcome Did not respond

## 2020-09-23 NOTE — PROGRESS NOTES
[facility] Internal Medicine    Progress Note     9/23/2020    10:39 AM    Name:   Yari Fulton  MRN:     [id number]     Acct:      [de-identified]   Room:   2093/2093[facility] Day:  4  Admit Date:  9/19/2020  7:39 PM    PCP:   Melvin Brown MD  Code Status:  Full Code    Subjective:     C/C:   Chief Complaint   Patient presents with    Fatigue     Principal Problem:    Atrial fibrillation with RVR ([facility]  Active Problems:    LBBB (left bundle branch block)    Essential hypertension    Acquired hypothyroidism    Systolic heart failure ([facility]  Resolved Problems:    * No resolved hospital problems. *      Interval History Status: not changed. Significant last 24 hr data reviewed ;   Vitals:    09/23/20 0006 09/23/20 0015 09/23/20 0405 09/23/20 0408   BP: (!) 140/76   136/74   Pulse: 96 94  99   Resp: 18   18   Temp: 98.6 °F (37 °C)   98.6 °F (37 °C)   TempSrc:    Oral   SpO2:       Weight:   201 lb 11.5 oz (91.5 kg)    Height:          No results found for this or any previous visit (from the past 24 hour(s)). No results for input(s): POCGLU in the last 72 hours. Xr Chest Portable    Result Date: 9/19/2020  EXAMINATION: ONE XRAY VIEW OF THE CHEST 9/19/2020 8:16 pm COMPARISON: April 27, 2020 HISTORY: ORDERING SYSTEM PROVIDED HISTORY: sob TECHNOLOGIST PROVIDED HISTORY: sob Reason for Exam: Pt states SOB and no other chest complaints Acuity: Unknown Type of Exam: Initial Mechanism of Injury: Pt states SOB and no other chest complaints FINDINGS: The lungs are without acute focal process. There is no effusion or pneumothorax. The cardiomediastinal silhouette is without acute process. The osseous structures are without acute process. No acute process.              HPI:   See history in H and P      Review of Systems:     Constitutional:  negative for chills, fevers, sweats  Respiratory:  negative for cough, dyspnea on exertion, hemoptysis, shortness of breath, ([facility] [I48.91] 04/15/2020       Plan:        1. A. fib with rapid ventricle response rate is not controlled Toprol dose increased 200 twice a day will add low-dose digoxin  2. Chronic systolic congestive heart failure compensated  3. Type II NSTEMI troponin leak secondary to A. fib with RVR  4. Rate not controlled digoxin increased to 250 mcg daily  5. A. fib rate not controlled high in 130s EF 42% already on high dose of metoprolol and digoxin will recommend amiodarone drip cardiology to be called recommendation    Medications:      Allergies:  No Known Allergies    Current Meds:   Scheduled Meds:    digoxin  250 mcg Oral Daily    metoprolol succinate  100 mg Oral BID    sodium chloride flush  10 mL Intravenous 2 times per day    apixaban  5 mg Oral BID    aspirin  81 mg Oral Daily    buPROPion  150 mg Oral BID    FLUoxetine  40 mg Oral Daily    levothyroxine  75 mcg Oral Daily    spironolactone  25 mg Oral Daily    atorvastatin  80 mg Oral Nightly    traZODone  50 mg Oral Nightly     Continuous Infusions:   PRN Meds: melatonin ER, potassium chloride **OR** potassium alternative oral replacement **OR** potassium chloride, sodium chloride flush, acetaminophen       Sudhir Barkley MD  9/23/2020  10:39 AM

## 2020-09-23 NOTE — PLAN OF CARE
Problem: Falls - Risk of:  Goal: Will remain free from falls  Description: Will remain free from falls  Outcome: Ongoing  Note: Pt had no falls through out shift     Problem: Activity:  Goal: Ability to tolerate increased activity will improve  Description: Ability to tolerate increased activity will improve  Outcome: Ongoing  Note: Pt ambulates out of bed to restroom     Problem: Safety:  Goal: Ability to remain free from injury will improve  Description: Ability to remain free from injury will improve  Outcome: Ongoing  Note: Pt uses call light prior to exiting bed.

## 2020-09-23 NOTE — PLAN OF CARE
Problem: Falls - Risk of:  Goal: Will remain free from falls  Description: Will remain free from falls  9/22/2020 2349 by Armida Mukherjee RN  Outcome: Met This Shift  9/22/2020 1647 by Nahum Alves RN  Outcome: Ongoing  Goal: Absence of physical injury  Description: Absence of physical injury  9/22/2020 2349 by Armida Mukherjee RN  Outcome: Met This Shift  9/22/2020 1647 by Nahum Alves RN  Outcome: Ongoing     Problem:  Activity:  Goal: Ability to tolerate increased activity will improve  Description: Ability to tolerate increased activity will improve  9/22/2020 2349 by Armida Mukherjee RN  Outcome: Met This Shift  9/22/2020 1647 by Nahum Alves RN  Outcome: Ongoing  Goal: Expression of feelings of increased energy will increase  Description: Expression of feelings of increased energy will increase  9/22/2020 2349 by Armida Mukherjee RN  Outcome: Met This Shift  9/22/2020 1647 by Nahum Alves RN  Outcome: Ongoing     Problem: Cardiac:  Goal: Ability to maintain an adequate cardiac output will improve  Description: Ability to maintain an adequate cardiac output will improve  9/22/2020 2349 by Armida Mukherjee RN  Outcome: Met This Shift  9/22/2020 1647 by Nahum Alves RN  Outcome: Ongoing  Goal: Complications related to the disease process, condition or treatment will be avoided or minimized  Description: Complications related to the disease process, condition or treatment will be avoided or minimized  9/22/2020 2349 by Armida Mukherjee RN  Outcome: Met This Shift  9/22/2020 1647 by Nahum Alves RN  Outcome: Ongoing     Problem: Coping:  Goal: Level of anxiety will decrease  Description: Level of anxiety will decrease  9/22/2020 2349 by Armida Mukherjee RN  Outcome: Met This Shift  9/22/2020 1647 by Nahum Alves RN  Outcome: Ongoing  Goal: General experience of comfort will improve  Description: General experience of comfort will improve  9/22/2020 2349 by Armida Mukherjee RN  Outcome: Met This Shift  9/22/2020 1647 by Lou Pan RN  Outcome: Ongoing     Problem: Health Behavior:  Goal: Ability to manage health-related needs will improve  Description: Ability to manage health-related needs will improve  9/22/2020 2349 by Kika Shah RN  Outcome: Met This Shift  9/22/2020 1647 by Lou Pan RN  Outcome: Ongoing     Problem: Safety:  Goal: Ability to remain free from injury will improve  Description: Ability to remain free from injury will improve  9/22/2020 2349 by Kika Shah RN  Outcome: Met This Shift  9/22/2020 1647 by Lou Pan RN  Outcome: Ongoing  Goal: Will show no signs and symptoms of excessive bleeding  Description: Will show no signs and symptoms of excessive bleeding  9/22/2020 2349 by Kika Shah RN  Outcome: Met This Shift  9/22/2020 1647 by Lou Pan RN  Outcome: Ongoing

## 2020-09-23 NOTE — CARE COORDINATION
ROYA intern met with Patient to discuss discharge plans. Patient was given list of [address] list of Rehab available. Patient states he would look over list with his wife. Patient also states he is not interested in going but would keep information. ROYA intern will follow-up with Patient if needed. ROYA garzon informed Carney [place]ard planning nurse of this information.      [facility] intern

## 2020-09-23 NOTE — PROGRESS NOTES
Writer spoke with Dr. Shabbir Snyder during rounding, due to patients elevated HR  with dose of lopressor and digoxin he suggested to consult Cardiology for possible amiodarone drip. Cardio paged and writer spoke with Dr Abena Butterfield. Per verbal order from Dr. Abena Butterfield as long as patient has not been off anticoagulant he is able to start amiodarone drip. Patient stated he has not been off eliquis and has been taking this at home as well. Amiodarone 150mg bolus and 1mg/min per Dr. Abena Butterfield ordered.

## 2020-09-24 ENCOUNTER — ANESTHESIA EVENT (OUTPATIENT)
Dept: OPERATING ROOM | Age: 85
DRG: 281 | End: 2020-09-24
Payer: MEDICARE

## 2020-09-24 ENCOUNTER — ANESTHESIA (OUTPATIENT)
Dept: OPERATING ROOM | Age: 85
DRG: 281 | End: 2020-09-24
Payer: MEDICARE

## 2020-09-24 VITALS — DIASTOLIC BLOOD PRESSURE: 66 MMHG | SYSTOLIC BLOOD PRESSURE: 125 MMHG | RESPIRATION RATE: 17 BRPM

## 2020-09-24 LAB
EKG ATRIAL RATE: 82 BPM
EKG Q-T INTERVAL: 404 MS
EKG QRS DURATION: 158 MS
EKG QTC CALCULATION (BAZETT): 558 MS
EKG R AXIS: -53 DEGREES
EKG T AXIS: 95 DEGREES
EKG VENTRICULAR RATE: 115 BPM
SARS-COV-2, RAPID: NOT DETECTED
SARS-COV-2: NORMAL
SARS-COV-2: NORMAL
SOURCE: NORMAL

## 2020-09-24 PROCEDURE — 6370000000 HC RX 637 (ALT 250 FOR IP): Performed by: NURSE PRACTITIONER

## 2020-09-24 PROCEDURE — 6370000000 HC RX 637 (ALT 250 FOR IP): Performed by: INTERNAL MEDICINE

## 2020-09-24 PROCEDURE — 2580000003 HC RX 258: Performed by: GENERAL PRACTICE

## 2020-09-24 PROCEDURE — 2500000003 HC RX 250 WO HCPCS: Performed by: NURSE ANESTHETIST, CERTIFIED REGISTERED

## 2020-09-24 PROCEDURE — 7100000001 HC PACU RECOVERY - ADDTL 15 MIN: Performed by: INTERNAL MEDICINE

## 2020-09-24 PROCEDURE — 3600000001 HC SURGERY LEVEL 1  BASE: Performed by: INTERNAL MEDICINE

## 2020-09-24 PROCEDURE — 2500000003 HC RX 250 WO HCPCS: Performed by: INTERNAL MEDICINE

## 2020-09-24 PROCEDURE — 2580000003 HC RX 258: Performed by: INTERNAL MEDICINE

## 2020-09-24 PROCEDURE — 99233 SBSQ HOSP IP/OBS HIGH 50: CPT | Performed by: INTERNAL MEDICINE

## 2020-09-24 PROCEDURE — 5A2204Z RESTORATION OF CARDIAC RHYTHM, SINGLE: ICD-10-PCS | Performed by: INTERNAL MEDICINE

## 2020-09-24 PROCEDURE — 7100000000 HC PACU RECOVERY - FIRST 15 MIN: Performed by: INTERNAL MEDICINE

## 2020-09-24 PROCEDURE — 2709999900 HC NON-CHARGEABLE SUPPLY: Performed by: INTERNAL MEDICINE

## 2020-09-24 PROCEDURE — 97116 GAIT TRAINING THERAPY: CPT

## 2020-09-24 PROCEDURE — 2580000003 HC RX 258: Performed by: ANESTHESIOLOGY

## 2020-09-24 PROCEDURE — 2060000000 HC ICU INTERMEDIATE R&B

## 2020-09-24 PROCEDURE — 6360000002 HC RX W HCPCS: Performed by: NURSE ANESTHETIST, CERTIFIED REGISTERED

## 2020-09-24 PROCEDURE — 6370000000 HC RX 637 (ALT 250 FOR IP): Performed by: GENERAL PRACTICE

## 2020-09-24 PROCEDURE — U0002 COVID-19 LAB TEST NON-CDC: HCPCS

## 2020-09-24 PROCEDURE — 3700000000 HC ANESTHESIA ATTENDED CARE: Performed by: INTERNAL MEDICINE

## 2020-09-24 PROCEDURE — 97110 THERAPEUTIC EXERCISES: CPT

## 2020-09-24 RX ORDER — MORPHINE SULFATE 2 MG/ML
2 INJECTION, SOLUTION INTRAMUSCULAR; INTRAVENOUS EVERY 5 MIN PRN
Status: DISCONTINUED | OUTPATIENT
Start: 2020-09-24 | End: 2020-09-24 | Stop reason: HOSPADM

## 2020-09-24 RX ORDER — LABETALOL HYDROCHLORIDE 5 MG/ML
5 INJECTION, SOLUTION INTRAVENOUS EVERY 10 MIN PRN
Status: DISCONTINUED | OUTPATIENT
Start: 2020-09-24 | End: 2020-09-24 | Stop reason: HOSPADM

## 2020-09-24 RX ORDER — AMIODARONE HYDROCHLORIDE 200 MG/1
400 TABLET ORAL DAILY
Status: DISCONTINUED | OUTPATIENT
Start: 2020-09-24 | End: 2020-09-25 | Stop reason: HOSPADM

## 2020-09-24 RX ORDER — PROPOFOL 10 MG/ML
INJECTION, EMULSION INTRAVENOUS PRN
Status: DISCONTINUED | OUTPATIENT
Start: 2020-09-24 | End: 2020-09-24 | Stop reason: SDUPTHER

## 2020-09-24 RX ORDER — MEPERIDINE HYDROCHLORIDE 25 MG/ML
12.5 INJECTION INTRAMUSCULAR; INTRAVENOUS; SUBCUTANEOUS EVERY 5 MIN PRN
Status: DISCONTINUED | OUTPATIENT
Start: 2020-09-24 | End: 2020-09-24 | Stop reason: HOSPADM

## 2020-09-24 RX ORDER — ONDANSETRON 2 MG/ML
4 INJECTION INTRAMUSCULAR; INTRAVENOUS
Status: DISCONTINUED | OUTPATIENT
Start: 2020-09-24 | End: 2020-09-24 | Stop reason: HOSPADM

## 2020-09-24 RX ORDER — SODIUM CHLORIDE 9 MG/ML
INJECTION, SOLUTION INTRAVENOUS CONTINUOUS
Status: DISCONTINUED | OUTPATIENT
Start: 2020-09-24 | End: 2020-09-24

## 2020-09-24 RX ORDER — LIDOCAINE HYDROCHLORIDE 20 MG/ML
INJECTION, SOLUTION EPIDURAL; INFILTRATION; INTRACAUDAL; PERINEURAL PRN
Status: DISCONTINUED | OUTPATIENT
Start: 2020-09-24 | End: 2020-09-24 | Stop reason: SDUPTHER

## 2020-09-24 RX ORDER — DIPHENHYDRAMINE HYDROCHLORIDE 50 MG/ML
12.5 INJECTION INTRAMUSCULAR; INTRAVENOUS
Status: DISCONTINUED | OUTPATIENT
Start: 2020-09-24 | End: 2020-09-24 | Stop reason: HOSPADM

## 2020-09-24 RX ADMIN — AMIODARONE HYDROCHLORIDE 0.5 MG/MIN: 1.8 INJECTION, SOLUTION INTRAVENOUS at 06:41

## 2020-09-24 RX ADMIN — APIXABAN 5 MG: 5 TABLET, FILM COATED ORAL at 20:56

## 2020-09-24 RX ADMIN — Medication 10 ML: at 20:56

## 2020-09-24 RX ADMIN — AMIODARONE HYDROCHLORIDE 400 MG: 200 TABLET ORAL at 18:40

## 2020-09-24 RX ADMIN — LEVOTHYROXINE SODIUM 75 MCG: 75 TABLET ORAL at 06:00

## 2020-09-24 RX ADMIN — PROPOFOL 30 MG: 10 INJECTION, EMULSION INTRAVENOUS at 16:30

## 2020-09-24 RX ADMIN — METOPROLOL SUCCINATE 100 MG: 100 TABLET, EXTENDED RELEASE ORAL at 20:56

## 2020-09-24 RX ADMIN — TRAZODONE HYDROCHLORIDE 50 MG: 50 TABLET ORAL at 20:56

## 2020-09-24 RX ADMIN — PROPOFOL 60 MG: 10 INJECTION, EMULSION INTRAVENOUS at 16:29

## 2020-09-24 RX ADMIN — FLUOXETINE HYDROCHLORIDE 40 MG: 20 CAPSULE ORAL at 08:30

## 2020-09-24 RX ADMIN — Medication 10 ML: at 08:32

## 2020-09-24 RX ADMIN — DIGOXIN 250 MCG: 250 TABLET ORAL at 08:30

## 2020-09-24 RX ADMIN — SODIUM CHLORIDE: 9 INJECTION, SOLUTION INTRAVENOUS at 16:28

## 2020-09-24 RX ADMIN — BUPROPION HYDROCHLORIDE 150 MG: 150 TABLET, EXTENDED RELEASE ORAL at 20:56

## 2020-09-24 RX ADMIN — APIXABAN 5 MG: 5 TABLET, FILM COATED ORAL at 08:29

## 2020-09-24 RX ADMIN — LIDOCAINE HYDROCHLORIDE 100 MG: 20 INJECTION, SOLUTION EPIDURAL; INFILTRATION; INTRACAUDAL at 16:29

## 2020-09-24 RX ADMIN — ATORVASTATIN CALCIUM 80 MG: 80 TABLET, FILM COATED ORAL at 20:56

## 2020-09-24 RX ADMIN — BUPROPION HYDROCHLORIDE 150 MG: 150 TABLET, EXTENDED RELEASE ORAL at 08:29

## 2020-09-24 RX ADMIN — AMIODARONE HYDROCHLORIDE 0.5 MG/MIN: 1.8 INJECTION, SOLUTION INTRAVENOUS at 17:45

## 2020-09-24 RX ADMIN — METOPROLOL SUCCINATE 100 MG: 100 TABLET, EXTENDED RELEASE ORAL at 08:30

## 2020-09-24 RX ADMIN — SPIRONOLACTONE 25 MG: 25 TABLET, FILM COATED ORAL at 08:30

## 2020-09-24 RX ADMIN — ASPIRIN 81 MG CHEWABLE TABLET 81 MG: 81 TABLET CHEWABLE at 08:29

## 2020-09-24 ASSESSMENT — PAIN SCALES - GENERAL
PAINLEVEL_OUTOF10: 0

## 2020-09-24 ASSESSMENT — LIFESTYLE VARIABLES: SMOKING_STATUS: 0

## 2020-09-24 ASSESSMENT — ENCOUNTER SYMPTOMS
STRIDOR: 0
SHORTNESS OF BREATH: 0

## 2020-09-24 ASSESSMENT — PAIN - FUNCTIONAL ASSESSMENT: PAIN_FUNCTIONAL_ASSESSMENT: 0-10

## 2020-09-24 ASSESSMENT — PAIN DESCRIPTION - PAIN TYPE: TYPE: OTHER (COMMENT)

## 2020-09-24 ASSESSMENT — PAIN DESCRIPTION - LOCATION: LOCATION: OTHER (COMMENT)

## 2020-09-24 NOTE — PROGRESS NOTES
Physical Therapy  Facility/Department: [facility] PROGRESSIVE CARE  Daily Treatment Note  NAME: Parish Venegas  : 1935  MRN: [id number]    Date of Service: 2020    Discharge Recommendations:  Patient would benefit from continued therapy after discharge        Assessment   Specific instructions for Next Treatment: Check with RN prior to session, check vitals  REQUIRES PT FOLLOW UP: Yes  Activity Tolerance  Activity Tolerance: Patient Tolerated treatment well     Patient Diagnosis(es): The primary encounter diagnosis was Atrial fibrillation with RVR ([facility] A diagnosis of Systolic heart failure, unspecified HF chronicity (HCC) was also pertinent to this visit. has a past medical history of A-fib [facility]), CVA (cerebral vascular accident) ([facility] and Myocardial infarct, old.   has no past surgical history on file. Restrictions  Restrictions/Precautions  Restrictions/Precautions: Weight Bearing, Fall Risk, Up as Tolerated  Required Braces or Orthoses?: Yes(uses R knee brace intermittently but states it does not help with stability or pain)  Upper Extremity Weight Bearing Restrictions  Other: Pt denies having wt bearing precautions from R knee scope to repair meniscus a few months ago  Position Activity Restriction  Other position/activity restrictions: Monitor vitals d/t A fib with RVR  Subjective   General  Chart Reviewed: Yes  Additional Pertinent Hx: Pt presents from home with sudden SOB, lightheadedness, diaphoresis in standing position. EKG: A fib with RVR and L bundle branch block, unchanged from prior EKG. Pt has elevated troponin d/t elevated HR, dx with NSTEMI. Additional PMH: EF 25-42%, cardiomyopathy, CHF  Response To Previous Treatment: Patient with no complaints from previous session.   Family / Caregiver Present: No  Subjective  Subjective: Patient is in bed upon arrival; pleasant and cooperative   General Comment  Comments: BRAYAN Wilkerson approves therapy  Pain Screening  Patient Currently in Pain: Denies  Vital Signs  Pulse: 115  Heart Rate Source: Monitor  BP: 129/86  BP Location: Right upper arm  Patient Currently in Pain: Denies  Patient Observation  Observations: Patients BP upon arrival was 117/102. After sitting on edge of bed and standing, patients BP was 129/86. HR ranged from 110-120       Orientation  Orientation  Overall Orientation Status: Within Functional Limits  Cognition      Objective   Bed mobility  Rolling to Right: Supervision  Supine to Sit: Supervision  Sit to Supine: Supervision  Comment: assisted with IV lines  Transfers  Sit to Stand: Stand by assistance  Stand to sit: Stand by assistance  Comment: verbal cueing needed for hand placement   Ambulation  Ambulation?: Yes  Ambulation 1  Surface: level tile  Device: Rolling Walker  Assistance: Contact guard assistance  Quality of Gait: no LOB noted  Gait Deviations: Increased JOLENE; Decreased step length  Distance: 4 steps forward; 4 steps backward; x2  Comments: Patient had more difficulty stepping backwards; slower candence backwards  Stairs/Curb  Stairs?: No        Other exercises  Other exercises?: Yes  Other exercises 1: bed exercises B LE x10   Other exercises 2: seated edge of bed exercies B LE x10   Other exercises 3: seated on edge of bed ~5 minutes     Goals  Short term goals  Time Frame for Short term goals: 5 days  Short term goal 1: Improve bed mobility to IND  Short term goal 2: Improve transfers with least restrictive device to MOD IND  Short term goal 3: Improve gait with least restrictive device to MOD IND 50 ft  Short term goal 4: Improve sitting and standing balance to good to increase safety  Short term goal 5: Improve endurance to good to allow pt to tolerate PT sessions  Patient Goals   Patient goals : increase activity    Plan    Plan  Times per week: 5-6x/wk  Times per day: Daily  Specific instructions for Next Treatment: Check with RN prior to session, check vitals  Current Treatment Recommendations: Transfer Training, Endurance Training, Patient/Caregiver Education & Training, Balance Training, Gait Training, Functional Mobility Training, Safety Education & Training  Safety Devices  Type of devices: Gait belt, Patient at risk for falls, Left in bed, Call light within reach, Nurse notified      09/24/20 1252   PT Individual Minutes   Time In [facility]   Time Out 1117   Minutes [address] [place] [address]

## 2020-09-24 NOTE — CARE COORDINATION
ONGOING DISCHARGE PLAN:    Spoke with patient regarding discharge plan and patient confirms that plan is now to have LSW follow for SNF, [facility]. Pt. States \" If I do not get stronger, I am afraid, My Wife will not be able to care for me\". PT. Rec SNF. Pt. Has been to [facility] in past & would like to go again. Notified, Vandana Cervantes, to make referral.    Pt. Remains on Amiodarone GTT, for A Fib/RVR. Pt. Is to have DCCV today. Cardio following. Pt. Will have VNS, Lisandra Bailon, when he does return to home. Will continue to follow for additional discharge needs.     Electronically signed by Yulissa Moran RN on 9/24/2020 at 12:30 PM

## 2020-09-24 NOTE — ANESTHESIA POSTPROCEDURE EVALUATION
POST- ANESTHESIA EVALUATION       Pt Name: Jossy Calles  MRN: [id number]  YOB: 1935  Date of evaluation: 9/24/2020  Time:  5:14 PM      /65   Pulse 56   Temp 97.7 °F (36.5 °C) (Infrared)   Resp 17   Ht 6' 6\" (1.981 m)   Wt 199 lb 1.2 oz (90.3 kg)   SpO2 94%   BMI 23.01 kg/m²      Consciousness Level  Awake  Cardiopulmonary Status  Stable  Pain Adequately Treated YES  Nausea / Vomiting  NO  Adequate Hydration  YES  Anesthesia Related Complications NONE      Electronically signed by Tami Clemons MD on 9/24/2020 at 5:14 PM       Department of Anesthesiology  Postprocedure Note    Patient: Jossy Calles  MRN: [id number]  YOB: 1935  Date of evaluation: 9/24/2020  Time:  5:14 PM     Procedure Summary     Date:  09/24/20 Room / Location:  [address] [place] [address] / [address] [place] [address]    Anesthesia Start:  1859 Anesthesia Stop:  0528    Procedure:  CARDIOVERSION (N/A ) Diagnosis:  (ATRIAL FIB)    Surgeon:  Enoch Lara MD Responsible Provider:  Tami Clemons MD    Anesthesia Type:  MAC, general ASA Status:  4          Anesthesia Type: No value filed. Missy Phase I: Missy Score: 9    Missy Phase II:      Last vitals: Reviewed and per EMR flowsheets.        Anesthesia Post Evaluation

## 2020-09-24 NOTE — ANESTHESIA PRE PROCEDURE
Department of Anesthesiology  Preprocedure Note       Name:  Janis Uriarte   Age:  82 y.o.  :  1935                                          MRN:  [id number]         Date:  2020      Surgeon: Braeden Martin):  Yariel Cooper MD    Procedure: Procedure(s):  CARDIOVERSION    Medications prior to admission:   Prior to Admission medications    Medication Sig Start Date End Date Taking?  Authorizing Provider   metoprolol succinate (TOPROL XL) 25 MG extended release tablet Take 1 tablet by mouth daily  Patient taking differently: Take 25 mg by mouth daily Patient was only taking 12.5mg 20  Yes Ivy Shukla MD   apixaban (ELIQUIS) 5 MG TABS tablet Take 1 tablet by mouth 2 times daily 20  Yes Catarina Rodriguez MD   furosemide (LASIX) 40 MG tablet Take 1 tablet by mouth 2 times daily 20  Yes Catarina Rodriguez MD   spironolactone (ALDACTONE) 25 MG tablet Take 1 tablet by mouth daily 20  Yes Catarina Rodriguez MD   aspirin 81 MG chewable tablet Take 1 tablet by mouth daily 20  Yes Catarina Rodriguez MD   FLUoxetine (PROZAC) 20 MG capsule Take 40 mg by mouth daily  3/24/20  Yes Historical Provider, MD   atorvastatin (LIPITOR) 80 MG tablet Take 80 mg by mouth daily 20  Yes Historical Provider, MD   buPROPion [facility]) 150 MG extended release tablet Take 150 mg by mouth 2 times daily 20  Yes Historical Provider, MD   levothyroxine (SYNTHROID) 75 MCG tablet Take 75 mcg by mouth daily 19  Yes Historical Provider, MD   traZODone (DESYREL) 50 MG tablet Take 25-75 mg by mouth nightly as needed for Sleep 3/24/20  Yes Historical Provider, MD       Current medications:    Current Facility-Administered Medications   Medication Dose Route Frequency Provider Last Rate Last Dose    melatonin tablet 3 mg  3 mg Oral Nightly PRN Allison Greene APRN - CRUZ        amiodarone (NEXTERONE) 360 mg in dextrose 5% 200 ml  0.5 mg/min Intravenous Continuous Yariel Cooper MD 16.7 mL/hr at 20 0641 0.5 mg/min at 09/24/20 0641    digoxin (LANOXIN) tablet 250 mcg  250 mcg Oral Daily Eliecer Nicole APRN - CNP   250 mcg at 09/24/20 0830    metoprolol succinate (TOPROL XL) extended release tablet 100 mg  100 mg Oral BID Eliecer Nicole APRN - CNP   100 mg at 09/24/20 0830    potassium chloride (KLOR-CON M) extended release tablet 40 mEq  40 mEq Oral PRN Adin Renee MD   40 mEq at 09/21/20 1115    Or    potassium bicarb-citric acid (EFFER-K) effervescent tablet 40 mEq  40 mEq Oral PRN Adin Renee MD        Or    potassium chloride 10 mEq/100 mL IVPB (Peripheral Line)  10 mEq Intravenous PRN Adin Renee MD        sodium chloride flush 0.9 % injection 10 mL  10 mL Intravenous 2 times per day Tj Fan DO   10 mL at 09/24/20 9198    sodium chloride flush 0.9 % injection 10 mL  10 mL Intravenous PRN Maxcine Adam Fan, DO        acetaminophen (TYLENOL) tablet 650 mg  650 mg Oral Q4H PRN Maxcine Adam Fan, DO   650 mg at 09/21/20 0751    apixaban (ELIQUIS) tablet 5 mg  5 mg Oral BID Maxcine Adam Fan, DO   5 mg at 09/24/20 8911    aspirin chewable tablet 81 mg  81 mg Oral Daily Maxcine Adam Wooan, DO   81 mg at 09/24/20 0829    buPROPion [facility]) extended release tablet 150 mg  150 mg Oral BID Maxcine Adam Fan, DO   150 mg at 09/24/20 0829    FLUoxetine (PROZAC) capsule 40 mg  40 mg Oral Daily Tj Fan DO   40 mg at 09/24/20 0830    levothyroxine (SYNTHROID) tablet 75 mcg  75 mcg Oral Daily Tj Fan DO   75 mcg at 09/24/20 0600    spironolactone (ALDACTONE) tablet 25 mg  25 mg Oral Daily Tj Fan DO   25 mg at 09/24/20 0830    atorvastatin (LIPITOR) tablet 80 mg  80 mg Oral Nightly Peg Garsia MD   80 mg at 09/23/20 2114    traZODone (DESYREL) tablet 50 mg  50 mg Oral Nightly Peg Garsia MD   50 mg at 09/23/20 5923       Allergies:  No Known Allergies    Problem List:    Patient Active Problem List   Diagnosis Code    Atrial fibrillation with RVR (HCC) I48.91    LBBB (left bundle branch block) LABGLOM 54 09/23/2020    GLUCOSE 118 09/23/2020    PROT 6.3 04/27/2020    CALCIUM 8.7 09/23/2020    BILITOT 0.83 04/27/2020    ALKPHOS 77 04/27/2020    AST 22 04/27/2020    ALT 22 04/27/2020       POC Tests: No results for input(s): POCGLU, POCNA, POCK, POCCL, POCBUN, POCHEMO, POCHCT in the last 72 hours. Coags:   Lab Results   Component Value Date    PROTIME 14.6 04/15/2020    INR 1.1 04/15/2020    APTT 30.5 04/15/2020       HCG (If Applicable): No results found for: PREGTESTUR, PREGSERUM, HCG, HCGQUANT     ABGs: No results found for: PHART, PO2ART, ZEB1ZZL, FSO7TND, BEART, R9ICLEOX     Type & Screen (If Applicable):  No results found for: LABABO, LABRH    Drug/Infectious Status (If Applicable):  No results found for: HIV, HEPCAB    COVID-19 Screening (If Applicable):   Lab Results   Component Value Date    COVID19 Not Detected 09/24/2020    COVID19 Not Detected 04/15/2020         Anesthesia Evaluation  Patient summary reviewed no history of anesthetic complications:   Airway: Mallampati: III  TM distance: >3 FB   Neck ROM: full  Mouth opening: > = 3 FB Dental: normal exam         Pulmonary:Negative Pulmonary ROS and normal exam  breath sounds clear to auscultation      (-) pneumonia, COPD, asthma, shortness of breath, recent URI, sleep apnea, rhonchi, wheezes, rales, stridor and not a current smoker          Patient did not smoke on day of surgery.                  Cardiovascular:  Exercise tolerance: poor (<4 METS),   (+) hypertension: no interval change, past MI: no interval change,     (-) pacemaker, valvular problems/murmurs, CAD, CABG/stent, dysrhythmias,  angina,  CHF, orthopnea, PND,  CHAVARRIA, murmur, weak pulses,  friction rub, systolic click, carotid bruit,  JVD and peripheral edema    ECG reviewed  Rhythm: regular  Rate: normal  Echocardiogram reviewed         Beta Blocker:  Dose within 24 Hrs         Neuro/Psych:   (+) CVA: no interval change,    (-) seizures, neuromuscular disease, TIA, headaches, psychiatric history and depression/anxiety            GI/Hepatic/Renal: Neg GI/Hepatic/Renal ROS       (-) hiatal hernia, GERD, PUD, hepatitis, liver disease, no renal disease, bowel prep and no morbid obesity       Endo/Other:    (+) hypothyroidism::., no malignancy/cancer. (-) diabetes mellitus, hyperthyroidism, blood dyscrasia, arthritis, no electrolyte abnormalities, no malignancy/cancer               Abdominal:           Vascular: negative vascular ROS. - PVD, DVT and PE. Anesthesia Plan      MAC and general     ASA 4       Induction: intravenous. MIPS: Postoperative opioids intended and Prophylactic antiemetics administered. Anesthetic plan and risks discussed with patient. Plan discussed with CRNA. The patient was counseled at length about the risks of tasha Covid-19 during their perioperative period and any recovery window from their procedure. The patient was made aware that tasha Covid-19  may worsen their prognosis for recovering from their procedure  and lend to a higher morbidity and/or mortality risk. All material risks, benefits, and reasonable alternatives including postponing the procedure were discussed. The patient DOES wish to proceed with the procedure at this time.             Ayush Nick MD   9/24/2020

## 2020-09-24 NOTE — PROGRESS NOTES
[place] [id number] Internal Medicine    Progress Note     9/24/2020    10:51 AM    Name:   Alisha Caballero  MRN:     [id number]     Acct:      [de-identified]   Room:   [facility] [place] [address] Day:  5  Admit Date:  9/19/2020  7:39 PM    PCP:   Daria Steven MD  Code Status:  Full Code    Subjective:     C/C:   Chief Complaint   Patient presents with    Fatigue     Principal Problem:    Atrial fibrillation with RVR ([address]  Active Problems:    LBBB (left bundle branch block)    Essential hypertension    Acquired hypothyroidism    Systolic heart failure ([address]  Resolved Problems:    * No resolved hospital problems. *      Interval History Status: not changed. Significant last 24 hr data reviewed ;   Vitals:    09/24/20 0035 09/24/20 0045 09/24/20 0816 09/24/20 0829   BP:  132/76 121/81 (!) 155/93   Pulse:  108 121 104   Resp:  16 20    Temp:  98.2 °F (36.8 °C) 98 °F (36.7 °C)    TempSrc:  Oral Oral    SpO2:  93% 95%    Weight: 199 lb 1.2 oz (90.3 kg)      Height:          Recent Results (from the past 24 hour(s))   BASIC METABOLIC PANEL    Collection Time: 09/23/20  1:15 PM   Result Value Ref Range    Glucose 118 (H) 70 - 99 mg/dL    BUN 28 (H) 8 - 23 mg/dL    CREATININE 1.28 (H) 0.70 - 1.20 mg/dL    Bun/Cre Ratio NOT REPORTED 9 - 20    Calcium 8.7 8.6 - 10.4 mg/dL    Sodium 135 135 - 144 mmol/L    Potassium 3.9 3.7 - 5.3 mmol/L    Chloride 100 98 - 107 mmol/L    CO2 27 20 - 31 mmol/L    Anion Gap 8 (L) 9 - 17 mmol/L    GFR Non-African American 54 (L) >60 mL/min    GFR African American >60 >60 mL/min    GFR Comment          GFR Staging NOT REPORTED    MAGNESIUM    Collection Time: 09/23/20  1:15 PM   Result Value Ref Range    Magnesium 2.3 1.6 - 2.6 mg/dL     No results for input(s): POCGLU in the last 72 hours.      Xr Chest Portable    Result Date: 9/19/2020  EXAMINATION: ONE XRAY VIEW OF THE CHEST 9/19/2020 8:16 pm COMPARISON: April 27, 2020 HISTORY: ORDERING SYSTEM PROVIDED HISTORY: sob TECHNOLOGIST PROVIDED HISTORY: sob Reason for Exam: Pt states SOB and no other chest complaints Acuity: Unknown Type of Exam: Initial Mechanism of Injury: Pt states SOB and no other chest complaints FINDINGS: The lungs are without acute focal process. There is no effusion or pneumothorax. The cardiomediastinal silhouette is without acute process. The osseous structures are without acute process. No acute process. HPI:   See history in H and P      Review of Systems:     Constitutional:  negative for chills, fevers, sweats  Respiratory:  negative for cough, dyspnea on exertion, hemoptysis, shortness of breath, wheezing  Cardiovascular: Positive for palpitation positive for leg gave out yesterday had to sit on the floor  Gastrointestinal:  negative for abdominal pain, constipation, diarrhea, nausea, vomiting  Neurological:  negative for dizziness, headache  Data:     Past Medical History:  no change     Social History:  no change    Family History: @no change    Vitals:      I/O (24Hr):   No intake or output data in the 24 hours ending 09/24/20 1051    Labs:    URINE ANALYSIS: No results found for: LABURIN     CBC:  Lab Results   Component Value Date    WBC 8.4 09/20/2020    HGB 13.2 09/20/2020     09/20/2020        BMP:    Lab Results   Component Value Date     09/23/2020    K 3.9 09/23/2020     09/23/2020    CO2 27 09/23/2020    BUN 28 09/23/2020    CREATININE 1.28 09/23/2020    GLUCOSE 118 09/23/2020      LIVER PROFILE:  Lab Results   Component Value Date    ALT 22 04/27/2020    AST 22 04/27/2020    PROT 6.3 04/27/2020    BILITOT 0.83 04/27/2020    LABALBU 3.5 04/27/2020               Radiology:      Physical Examination:        General appearance:  alert, cooperative and no distress  Mental Status:  oriented to person, place and time and normal affect  Lungs:  clear to auscultation bilaterally, normal effort  Heart: Irregularly irregular  tachycardia  Abdomen:  soft, nontender, nondistended, normal bowel sounds, no masses, hepatomegaly, splenomegaly  Extremities:  no edema, redness, tenderness in the calves  Skin:  no gross lesions, rashes, induration    Assessment:        Primary Problem  Atrial fibrillation with RVR [facility])    Active Hospital Problems    Diagnosis Date Noted    [facility] [I50.20] 04/27/2020    Acquired hypothyroidism [E03.9] 04/16/2020    Essential hypertension [I10] 04/16/2020    LBBB (left bundle branch block) [I44.7] 04/16/2020    Atrial fibrillation with RVR ([facility] [I48.91] 04/15/2020       Plan:        1. A. fib with rapid ventricle response rate is not controlled Toprol dose increased 200 twice a day will add low-dose digoxin  2. Chronic systolic congestive heart failure compensated  3. Type II NSTEMI troponin leak secondary to A. fib with RVR  4. Rate not controlled digoxin increased to 250 mcg daily  5. A. fib rate not controlled high in 130s EF 42% already on high dose of metoprolol and digoxin will recommend amiodarone drip cardiology to be called recommendation  6. Sep 24  7. A. fib with RVR rate not controlled amiodarone drip started yesterday  8. We will continue the drip and transition to oral  9. Cardioversion planned today    Medications:      Allergies:  No Known Allergies    Current Meds:   Scheduled Meds:    digoxin  250 mcg Oral Daily    metoprolol succinate  100 mg Oral BID    sodium chloride flush  10 mL Intravenous 2 times per day    apixaban  5 mg Oral BID    aspirin  81 mg Oral Daily    buPROPion  150 mg Oral BID    FLUoxetine  40 mg Oral Daily    levothyroxine  75 mcg Oral Daily    spironolactone  25 mg Oral Daily    atorvastatin  80 mg Oral Nightly    traZODone  50 mg Oral Nightly     Continuous Infusions:    Amiodarone 0.5 mg/min (09/24/20 0641)     PRN Meds: melatonin, potassium chloride **OR** potassium alternative oral replacement **OR** potassium chloride, sodium chloride flush, acetaminophen       Luis S Elkin Tellez MD  9/24/2020  10:51 AM

## 2020-09-24 NOTE — PROGRESS NOTES
Writer spoke to Charge Nurse about surgery calling and saying that there was need for a COVID swab, after reviewing with supervisor, a COVID 19 swab is not needed for a cardioversion.

## 2020-09-24 NOTE — CARE COORDINATION
ROYA received info that this patient is now saying that if he does not start to move better than he will likely need to go for some rehabilitation. He has been to [facility] before and if he needs he would go to [facility] again. SW faxed the referral to [facility] and Eyal Mcpherson in admissions reported that he is accepted and can admit when he is ready. SW following.

## 2020-09-24 NOTE — PLAN OF CARE
Problem: Falls - Risk of:  Goal: Will remain free from falls  Description: Will remain free from falls  Outcome: Met This Shift  Note: Patient remained free of falls throughout the shift, bed low and locked, call light within reach, bed alarm on. Problem: Activity:  Goal: Ability to tolerate increased activity will improve  Description: Ability to tolerate increased activity will improve  Outcome: Ongoing  Note: Patient had cardioversion done, returned to a sinus bradycardia after. Problem: Cardiac:  Goal: Ability to maintain an adequate cardiac output will improve  Description: Ability to maintain an adequate cardiac output will improve  Outcome: Ongoing     Problem: Coping:  Goal: Level of anxiety will decrease  Description: Level of anxiety will decrease  Outcome: Ongoing  Note: Patient expresses concerns about strength and is agreeable to getting some rehab.       Problem: Health Behavior:  Goal: Ability to manage health-related needs will improve  Description: Ability to manage health-related needs will improve  Outcome: Ongoing     Problem: Safety:  Goal: Ability to remain free from injury will improve  Description: Ability to remain free from injury will improve  Outcome: Ongoing     Problem: Skin Integrity:  Goal: Will show no infection signs and symptoms  Description: Will show no infection signs and symptoms  Outcome: Ongoing

## 2020-09-24 NOTE — PROGRESS NOTES
Progress Note    Patient Name:  Cynthia Marmolejo    :  1935 7:14 AM      SUBJECTIVE       Mr. Albertina Denton  has no chest pain, shortness of breath, palpitations, nausea or vomiting      OBJECTIVE     Vital signs:    /76   Pulse 108   Temp 98.2 °F (36.8 °C) (Oral)   Resp 16   Ht 6' 6\" (1.981 m)   Wt 199 lb 1.2 oz (90.3 kg)   SpO2 93%   BMI 23.01 kg/m²  0 L/min      Admit Weight:  205 lb (93 kg)    Last 3 weights: Wt Readings from Last 3 Encounters:   20 199 lb 1.2 oz (90.3 kg)   20 209 lb 7 oz (95 kg)   20 209 lb 14.1 oz (95.2 kg)       BMI: Body mass index is 23.01 kg/m². Input/Output:     No intake or output data in the 24 hours ending 20 0714      Exam:     General appearance: awake and alert moves all ext   Lungs: no rhonchi, no wheezes, no rales  Heart: S1 and S2 no murmur  Abdomen: positive bowel sounds, no bruits, no masses  Extremities: warm and dry, no cyanosis, no clubbing        Laboratory Studies:     CBC: No results for input(s): WBC, HGB, HCT, MCV, PLT in the last 72 hours. BMP:   Recent Labs     20  1315      K 3.9      CO2 27   BUN 28*   CREATININE 1.28*     PT/INR: No results for input(s): PROTIME, INR in the last 72 hours. APTT: No results for input(s): APTT in the last 72 hours. MAG:   Recent Labs     20  1315   MG 2.3     D Dimer: No results for input(s): DDIMER in the last 72 hours. Troponin  No results for input(s): TROPONINI in the last 72 hours. No results for input(s): TROPONINT in the last 72 hours. BNP No results for input(s): BNP in the last 72 hours. No results for input(s): PROBNP in the last 72 hours. Pulse Ox:  SpO2  Av %  Min: 93 %  Max: 95 %  Supplemental O2: O2 Flow Rate (L/min): 0 L/min     Current Meds:    digoxin  250 mcg Oral Daily    metoprolol succinate  100 mg Oral BID    sodium chloride flush  10 mL Intravenous 2 times per day    apixaban  5 mg Oral BID    aspirin  81 mg Oral Daily    buPROPion  150 mg Oral BID    FLUoxetine  40 mg Oral Daily    levothyroxine  75 mcg Oral Daily    spironolactone  25 mg Oral Daily    atorvastatin  80 mg Oral Nightly    traZODone  50 mg Oral Nightly     Continuous Infusions:    Amiodarone 0.5 mg/min (09/24/20 0641)            ASSESSMENT     Principal Problem:    Atrial fibrillation with RVR (HCC)  Active Problems:    LBBB (left bundle branch block)    Essential hypertension    Acquired hypothyroidism    Systolic heart failure (HCC)  Resolved Problems:    * No resolved hospital problems. *      PLAN     1.  Paroxysmal atrial fibrillation  -patient was found to be in RVR on presentation  -refractory to increased toprol, digoxin and amiodarone   -amiodarone gtt continues  -asymptomatic  -heart rates now in the 90s, up into the 120s with any movement or activity  -NPO after midnight for DCCV today, has been on anticoagulation greater than 3 weeks with no missed doses per patient report.      2.  Chronic systolic heart failure  -recent TTE 4/2020 showed LV function of 25%, recent stress showed LV function of 42%  -creatinine stable  -will need optimization of heart failure medications prior to discharge  -possible referral to CHF clinic  -compensated on exam.   -once heart rate is controlled, will add entresto     3.  Nonspecific troponin elevation  -Type 2, most likely secondary to increased heart rate  -denies any type of chest pain or anginal symptoms  -recent negative stress testing     4.  Chronic left bundle-branch block       Spoke with Dr. Saray Byers, will arrange DCCV today. Keep NPO, ok for meds with sip of water.

## 2020-09-24 NOTE — FLOWSHEET NOTE
09/24/20 1233   Encounter Summary   Services provided to: Patient   Referral/Consult From: Rounding   Complexity of Encounter Low   Length of Encounter 15 minutes   Spiritual/Jain   Type Spiritual support   Assessment Sleeping   Intervention Prayer   Outcome Did not respond

## 2020-09-25 VITALS
RESPIRATION RATE: 16 BRPM | TEMPERATURE: 97.4 F | OXYGEN SATURATION: 94 % | WEIGHT: 204.81 LBS | HEIGHT: 78 IN | HEART RATE: 52 BPM | BODY MASS INDEX: 23.7 KG/M2 | DIASTOLIC BLOOD PRESSURE: 73 MMHG | SYSTOLIC BLOOD PRESSURE: 143 MMHG

## 2020-09-25 LAB
ANION GAP SERPL CALCULATED.3IONS-SCNC: 9 MMOL/L (ref 9–17)
BUN BLDV-MCNC: 27 MG/DL (ref 8–23)
BUN/CREAT BLD: ABNORMAL (ref 9–20)
CALCIUM SERPL-MCNC: 8.5 MG/DL (ref 8.6–10.4)
CHLORIDE BLD-SCNC: 104 MMOL/L (ref 98–107)
CO2: 26 MMOL/L (ref 20–31)
CREAT SERPL-MCNC: 1.27 MG/DL (ref 0.7–1.2)
GFR AFRICAN AMERICAN: >60 ML/MIN
GFR NON-AFRICAN AMERICAN: 54 ML/MIN
GFR SERPL CREATININE-BSD FRML MDRD: ABNORMAL ML/MIN/{1.73_M2}
GFR SERPL CREATININE-BSD FRML MDRD: ABNORMAL ML/MIN/{1.73_M2}
GLUCOSE BLD-MCNC: 82 MG/DL (ref 70–99)
HCT VFR BLD CALC: 34.3 % (ref 41–53)
HEMOGLOBIN: 12 G/DL (ref 13.5–17.5)
MCH RBC QN AUTO: 32.4 PG (ref 26–34)
MCHC RBC AUTO-ENTMCNC: 35 G/DL (ref 31–37)
MCV RBC AUTO: 92.6 FL (ref 80–100)
NRBC AUTOMATED: ABNORMAL PER 100 WBC
PDW BLD-RTO: 14.2 % (ref 11.5–14.9)
PLATELET # BLD: 198 K/UL (ref 150–450)
PMV BLD AUTO: 8.1 FL (ref 6–12)
POTASSIUM SERPL-SCNC: 3.9 MMOL/L (ref 3.7–5.3)
RBC # BLD: 3.7 M/UL (ref 4.5–5.9)
SODIUM BLD-SCNC: 139 MMOL/L (ref 135–144)
WBC # BLD: 7.1 K/UL (ref 3.5–11)

## 2020-09-25 PROCEDURE — 80048 BASIC METABOLIC PNL TOTAL CA: CPT

## 2020-09-25 PROCEDURE — 6370000000 HC RX 637 (ALT 250 FOR IP): Performed by: INTERNAL MEDICINE

## 2020-09-25 PROCEDURE — 6370000000 HC RX 637 (ALT 250 FOR IP): Performed by: NURSE PRACTITIONER

## 2020-09-25 PROCEDURE — 36415 COLL VENOUS BLD VENIPUNCTURE: CPT

## 2020-09-25 PROCEDURE — 99239 HOSP IP/OBS DSCHRG MGMT >30: CPT | Performed by: INTERNAL MEDICINE

## 2020-09-25 PROCEDURE — 2580000003 HC RX 258: Performed by: INTERNAL MEDICINE

## 2020-09-25 PROCEDURE — 85027 COMPLETE CBC AUTOMATED: CPT

## 2020-09-25 RX ORDER — LISINOPRIL 2.5 MG/1
2.5 TABLET ORAL DAILY
Qty: 30 TABLET | Refills: 3 | Status: SHIPPED | OUTPATIENT
Start: 2020-09-26 | End: 2022-01-24

## 2020-09-25 RX ORDER — AMIODARONE HYDROCHLORIDE 400 MG/1
400 TABLET ORAL DAILY
Qty: 30 TABLET | Refills: 3 | Status: ON HOLD
Start: 2020-09-25 | End: 2020-10-23 | Stop reason: HOSPADM

## 2020-09-25 RX ORDER — METOPROLOL SUCCINATE 100 MG/1
100 TABLET, EXTENDED RELEASE ORAL 2 TIMES DAILY
Qty: 30 TABLET | Refills: 3 | Status: ON HOLD
Start: 2020-09-25 | End: 2020-10-23 | Stop reason: HOSPADM

## 2020-09-25 RX ORDER — LISINOPRIL 5 MG/1
2.5 TABLET ORAL DAILY
Status: DISCONTINUED | OUTPATIENT
Start: 2020-09-25 | End: 2020-09-25 | Stop reason: HOSPADM

## 2020-09-25 RX ADMIN — DIGOXIN 250 MCG: 250 TABLET ORAL at 10:08

## 2020-09-25 RX ADMIN — LEVOTHYROXINE SODIUM 75 MCG: 75 TABLET ORAL at 05:41

## 2020-09-25 RX ADMIN — FLUOXETINE HYDROCHLORIDE 40 MG: 20 CAPSULE ORAL at 10:08

## 2020-09-25 RX ADMIN — ASPIRIN 81 MG CHEWABLE TABLET 81 MG: 81 TABLET CHEWABLE at 10:08

## 2020-09-25 RX ADMIN — METOPROLOL SUCCINATE 100 MG: 100 TABLET, EXTENDED RELEASE ORAL at 10:09

## 2020-09-25 RX ADMIN — APIXABAN 5 MG: 5 TABLET, FILM COATED ORAL at 10:08

## 2020-09-25 RX ADMIN — AMIODARONE HYDROCHLORIDE 400 MG: 200 TABLET ORAL at 10:08

## 2020-09-25 RX ADMIN — LISINOPRIL 2.5 MG: 5 TABLET ORAL at 10:08

## 2020-09-25 RX ADMIN — Medication 10 ML: at 10:51

## 2020-09-25 RX ADMIN — BUPROPION HYDROCHLORIDE 150 MG: 150 TABLET, EXTENDED RELEASE ORAL at 10:08

## 2020-09-25 RX ADMIN — SPIRONOLACTONE 25 MG: 25 TABLET, FILM COATED ORAL at 10:08

## 2020-09-25 NOTE — PROGRESS NOTES
Writer entered the room to find patient upset about \"waiting all day to go to [place]  at bedside, letting patient know that hospital set up transportation would not be available until after nine tonight, patient became upset and did not want to go if that was the case. After discussing it with his wife, a ride from a Neighbor, Kia Amin was arranged to [facility] to drop him off there. Social work aware, writer to call report to [facility].

## 2020-09-25 NOTE — PROGRESS NOTES
Progress Note    Patient Name:  Milly Sen    :  1935 7:19 AM      SUBJECTIVE       Mr. Merry Burleson  has no chest pain, shortness of breath, palpitations, nausea or vomiting      OBJECTIVE     Vital signs:    BP (!) 127/53   Pulse 55   Temp 98.1 °F (36.7 °C)   Resp 18   Ht 6' 6\" (1.981 m)   Wt 204 lb 12.9 oz (92.9 kg)   SpO2 93%   BMI 23.67 kg/m²  2 L/min      Admit Weight:  205 lb (93 kg)    Last 3 weights: Wt Readings from Last 3 Encounters:   20 204 lb 12.9 oz (92.9 kg)   20 209 lb 7 oz (95 kg)   20 209 lb 14.1 oz (95.2 kg)       BMI: Body mass index is 23.67 kg/m². Input/Output:       Intake/Output Summary (Last 24 hours) at 2020 0719  Last data filed at 2020 1842  Gross per 24 hour   Intake 800 ml   Output --   Net 800 ml         Exam:     General appearance: awake and alert moves all ext   Lungs: no rhonchi, no wheezes, no rales  Heart: S1 and S2 no murmur  Abdomen: positive bowel sounds, no bruits, no masses  Extremities: warm and dry, no cyanosis, no clubbing        Laboratory Studies:     CBC: No results for input(s): WBC, HGB, HCT, MCV, PLT in the last 72 hours. BMP:   Recent Labs     20  1315      K 3.9      CO2 27   BUN 28*   CREATININE 1.28*     PT/INR: No results for input(s): PROTIME, INR in the last 72 hours. APTT: No results for input(s): APTT in the last 72 hours. MAG:   Recent Labs     20  1315   MG 2.3     D Dimer: No results for input(s): DDIMER in the last 72 hours. Troponin  No results for input(s): TROPONINI in the last 72 hours. No results for input(s): TROPONINT in the last 72 hours. BNP No results for input(s): BNP in the last 72 hours. No results for input(s): PROBNP in the last 72 hours. Pulse Ox:  SpO2  Av.9 %  Min: 93 %  Max: 100 %  Supplemental O2: O2 Flow Rate (L/min): 2 L/min     Current Meds:    amiodarone  400 mg Oral Daily    digoxin  250 mcg Oral Daily    metoprolol succinate  100 mg Oral BID    sodium chloride flush  10 mL Intravenous 2 times per day    apixaban  5 mg Oral BID    aspirin  81 mg Oral Daily    buPROPion  150 mg Oral BID    FLUoxetine  40 mg Oral Daily    levothyroxine  75 mcg Oral Daily    spironolactone  25 mg Oral Daily    atorvastatin  80 mg Oral Nightly    traZODone  50 mg Oral Nightly     Continuous Infusions:          ASSESSMENT     Principal Problem:    Atrial fibrillation with RVR ([facility])  Active Problems:    LBBB (left bundle branch block)    Essential hypertension    Acquired hypothyroidism    Systolic heart failure (HCC)  Resolved Problems:    * No resolved hospital problems. *      PLAN     1.  Paroxysmal atrial fibrillation  -patient was found to be in RVR on presentation  -refractory to increased toprol, digoxin and amiodarone   -DCCV done 9/24 with return to sinus rhyth  -maintained in sinus rthym on monitor  -ok for d/c from cardiology standpoint  -continue amiodarone oral  -will arrange f/u with EP service at discharge    2.  Chronic systolic heart failure  -recent TTE 4/2020 showed LV function of 25%, recent stress showed LV function of 42%  -creatinine stable  -will need optimization of heart failure medications prior to discharge  -possible referral to CHF clinic  -compensated on exam.   -start lisinopril 2.5mg today, uptitration as op     3.  Nonspecific troponin elevation  -Type 2, most likely secondary to increased heart rate  -denies any type of chest pain or anginal symptoms  -recent negative stress testing     4.  Chronic left bundle-branch block      Patient resting in bed. Maintained sinus rhythm, will arrange out pt follow up with CHF clinic and EP service. Continue amiodarone oral, will add lisinopril today.

## 2020-09-25 NOTE — PROGRESS NOTES
Writer spoke to [facility] RN regarding patient coming to the facility upon hospital discharge. All care, meds and procedures gone over at this time and all questions answered. Writer let the nurse know that patient was coming via neighbor and not hospital transport, and told writer someone would meet them at the front entrance. Patient discharged to [facility] via [facility]. All belonginngs with patient and all questions answered at this time. Discharge paper work given to him, and writer told him to hand it to the [facility] who would be meeting them at the entrance.

## 2020-09-25 NOTE — PROGRESS NOTES
Writer spoke to brigida Vides for amiodarone drip to be d/c'd and to start oral 400mg amiodarone daily.

## 2020-09-25 NOTE — DISCHARGE SUMMARY
[place] [address] Internal Medicine    Discharge Summary     Patient ID: Andres Lindsay  :  1935   MRN: [id number]     ACCOUNT:  [de-identified]   Patient's PCP: Damian Scott MD  Admit Date: 2020   Discharge Date: 2020    Length of Stay: 6  Code Status:  Full Code  Admitting Physician: Laurie Manuel MD  Discharge Physician: Sudhir Barkley MD     Active Discharge Diagnoses:     Primary Problem  Atrial fibrillation with RVR [facility])      [place] Problems    Diagnosis Date Noted    Systolic heart failure ([facility] [address] [I50.20] 2020    Acquired hypothyroidism [E03.9] 2020    Essential hypertension [I10] 2020    LBBB (left bundle branch block) [I44.7] 2020    Atrial fibrillation with RVR ([facility] [address] [I48.91] 04/15/2020       Admission Condition:  fair     Discharged Condition: fair    Hospital Stay:     Hospital Course:  Andres Lindsay is a 82 y.o. male who was admitted for the management of Atrial fibrillation with RVR ([facility] [address] , presented to ER with Fatigue  82year-old gentleman with a history of paroxysmal atrial fibrillation on aspirin and Eliquis admitted with palpitation and fatigue diagnosed with atrial fibrillation with rapid ventricular response patient was initially managed with increasing dose of metoprolol from 25 200 twice a day he did not improve and then this dose of digoxin was given which she failed patient underwent DC cardioversion which converted him back to sinus rhythm  Plan is to discharge on metoprolol 100 twice a day digoxin is discontinued amiodarone oral discontinued low-dose lisinopril started patient is advised to decrease the dose of metoprolol from 100-50 if heart rate below 50 patient is going to [employer]        Significant therapeutic interventions:     Significant Diagnostic Studies:   Labs / Micro:        ,     Radiology:    Xr Chest Portable    Result Date: 2020  EXAMINATION: ONE XRAY VIEW known as:  LIPITOR     buPROPion 150 MG extended release tablet  Commonly known as:  WELLBUTRIN SR     FLUoxetine 20 MG capsule  Commonly known as:  PROZAC     furosemide 40 MG tablet  Commonly known as:  LASIX  Take 1 tablet by mouth 2 times daily     levothyroxine 75 MCG tablet  Commonly known as:  SYNTHROID     spironolactone 25 MG tablet  Commonly known as:  ALDACTONE  Take 1 tablet by mouth daily     traZODone 50 MG tablet  Commonly known as:  DESYREL           Where to Get Your Medications      These medications were sent to [address], [place] [address]    Phone:  [phone]   · amiodarone 400 MG tablet  · lisinopril 2.5 MG tablet  · metoprolol succinate 100 MG extended release tablet         Time Spent on discharge is  35 mins in patient examination, evaluation, counseling as well as medication reconciliation, prescriptions for required medications, discharge plan and follow up. Electronically signed by   Yasemin Mar MD  9/25/2020  2:01 PM      Thank you Dr. Asad Soto MD for the opportunity to be involved in this patient's care.

## 2020-09-25 NOTE — PROGRESS NOTES
Patient declining heart monitor at this time because he is \"going to [facility] soon and does not need it\".

## 2020-09-25 NOTE — CARE COORDINATION
ROYA received a request to see this patient and inform him that everything for him to go to [facility] has been completed as he believes he has to set this up himself. ROYA did go in the room and informed the patient that [facility] already has everything that they need and they have accepted him to admit there. ROYA informed the patient that SW was told that he would likely DC there on this date. Patient was satisfied with this information. Patient asked ROYA if there was a phone in his room, SW located it and put it within reach of the patient. SW following.

## 2020-09-25 NOTE — PLAN OF CARE
Problem: Falls - Risk of:  Goal: Will remain free from falls  Description: Will remain free from falls  9/25/2020 0401 by Luna Benedict RN  Outcome: Met This Shift  Patient remained free from falls this shift. Bed in lowest position, call light within reach, bed side rails up and frequent RN rounds made. Problem: Cardiac:  Goal: Ability to maintain an adequate cardiac output will improve  Description: Ability to maintain an adequate cardiac output will improve  9/25/2020 0401 by Luna Benedict RN  Outcome: Met This Shift  Patient maintained an adequate cardiac output this shift. HR within limits and patient sleeping restfully. Problem: Skin Integrity:  Goal: Absence of new skin breakdown  Description: Absence of new skin breakdown  Outcome: Met This Shift  Patient remained free from absence of new skin breakdown this shift. Skin kept clean and dry.

## 2020-09-30 ENCOUNTER — HOSPITAL ENCOUNTER (OUTPATIENT)
Age: 85
Setting detail: SPECIMEN
Discharge: HOME | End: 2020-09-30
Payer: MEDICARE

## 2020-09-30 LAB
ANION GAP SERPL CALCULATED.3IONS-SCNC: 10 MMOL/L (ref 9–17)
BUN BLDV-MCNC: 19 MG/DL (ref 8–23)
BUN/CREAT BLD: ABNORMAL (ref 9–20)
CALCIUM SERPL-MCNC: 8.5 MG/DL (ref 8.6–10.4)
CHLORIDE BLD-SCNC: 101 MMOL/L (ref 98–107)
CO2: 30 MMOL/L (ref 20–31)
CREAT SERPL-MCNC: 1.45 MG/DL (ref 0.7–1.2)
GFR AFRICAN AMERICAN: 56 ML/MIN
GFR NON-AFRICAN AMERICAN: 46 ML/MIN
GFR SERPL CREATININE-BSD FRML MDRD: ABNORMAL ML/MIN/{1.73_M2}
GFR SERPL CREATININE-BSD FRML MDRD: ABNORMAL ML/MIN/{1.73_M2}
GLUCOSE BLD-MCNC: 75 MG/DL (ref 70–99)
HCT VFR BLD CALC: 38.6 % (ref 40.7–50.3)
HEMOGLOBIN: 12.7 G/DL (ref 13–17)
MCH RBC QN AUTO: 31.3 PG (ref 25.2–33.5)
MCHC RBC AUTO-ENTMCNC: 32.9 G/DL (ref 28.4–34.8)
MCV RBC AUTO: 95.1 FL (ref 82.6–102.9)
NRBC AUTOMATED: 0 PER 100 WBC
PDW BLD-RTO: 13.2 % (ref 11.8–14.4)
PLATELET # BLD: 221 K/UL (ref 138–453)
PMV BLD AUTO: 9.4 FL (ref 8.1–13.5)
POTASSIUM SERPL-SCNC: 3.7 MMOL/L (ref 3.7–5.3)
RBC # BLD: 4.06 M/UL (ref 4.21–5.77)
SODIUM BLD-SCNC: 141 MMOL/L (ref 135–144)
WBC # BLD: 6.2 K/UL (ref 3.5–11.3)

## 2020-09-30 PROCEDURE — 80048 BASIC METABOLIC PNL TOTAL CA: CPT

## 2020-09-30 PROCEDURE — 85027 COMPLETE CBC AUTOMATED: CPT

## 2020-09-30 PROCEDURE — 36415 COLL VENOUS BLD VENIPUNCTURE: CPT

## 2020-09-30 PROCEDURE — P9603 ONE-WAY ALLOW PRORATED MILES: HCPCS

## 2020-10-22 ENCOUNTER — APPOINTMENT (OUTPATIENT)
Dept: CT IMAGING | Age: 85
DRG: 312 | End: 2020-10-22
Payer: MEDICARE

## 2020-10-22 ENCOUNTER — APPOINTMENT (OUTPATIENT)
Dept: GENERAL RADIOLOGY | Age: 85
DRG: 312 | End: 2020-10-22
Payer: MEDICARE

## 2020-10-22 ENCOUNTER — HOSPITAL ENCOUNTER (INPATIENT)
Age: 85
LOS: 2 days | Discharge: HOME | DRG: 312 | End: 2020-10-24
Attending: EMERGENCY MEDICINE | Admitting: INTERNAL MEDICINE
Payer: MEDICARE

## 2020-10-22 PROBLEM — R55 SYNCOPE AND COLLAPSE: Status: ACTIVE | Noted: 2020-10-22

## 2020-10-22 PROBLEM — Z96.1 PSEUDOPHAKIA OF BOTH EYES: Status: ACTIVE | Noted: 2019-02-08

## 2020-10-22 PROBLEM — F41.9 ANXIETY: Status: ACTIVE | Noted: 2017-05-11

## 2020-10-22 PROBLEM — E78.5 HYPERLIPIDEMIA: Status: ACTIVE | Noted: 2020-10-22

## 2020-10-22 PROBLEM — Z85.828 HISTORY OF MOHS MICROGRAPHIC SURGERY FOR SKIN CANCER: Status: ACTIVE | Noted: 2017-12-01

## 2020-10-22 PROBLEM — Z98.890 HISTORY OF MOHS MICROGRAPHIC SURGERY FOR SKIN CANCER: Status: ACTIVE | Noted: 2017-12-01

## 2020-10-22 PROBLEM — I07.1 MILD TRICUSPID REGURGITATION: Status: ACTIVE | Noted: 2020-07-20

## 2020-10-22 PROBLEM — I27.20 MILD PULMONARY HYPERTENSION (HCC): Status: ACTIVE | Noted: 2020-07-20

## 2020-10-22 PROBLEM — R55 SYNCOPE: Status: ACTIVE | Noted: 2020-10-22

## 2020-10-22 PROBLEM — M48.061 SPINAL STENOSIS OF LUMBAR REGION: Status: ACTIVE | Noted: 2017-05-11

## 2020-10-22 PROBLEM — E87.6 HYPOKALEMIA: Status: ACTIVE | Noted: 2020-10-22

## 2020-10-22 PROBLEM — I34.0 MILD MITRAL REGURGITATION: Status: ACTIVE | Noted: 2020-10-22

## 2020-10-22 PROBLEM — K57.30 DIVERTICULOSIS OF LARGE INTESTINE: Status: ACTIVE | Noted: 2017-05-11

## 2020-10-22 LAB
ABSOLUTE EOS #: 0 K/UL (ref 0–0.4)
ABSOLUTE IMMATURE GRANULOCYTE: ABNORMAL K/UL (ref 0–0.3)
ABSOLUTE LYMPH #: 0.7 K/UL (ref 1–4.8)
ABSOLUTE MONO #: 0.5 K/UL (ref 0.1–1.3)
ALBUMIN SERPL-MCNC: 3.7 G/DL (ref 3.5–5.2)
ALBUMIN/GLOBULIN RATIO: NORMAL (ref 1–2.5)
ALP BLD-CCNC: 91 U/L (ref 40–129)
ALT SERPL-CCNC: 23 U/L (ref 5–41)
ANION GAP SERPL CALCULATED.3IONS-SCNC: 10 MMOL/L (ref 9–17)
AST SERPL-CCNC: 28 U/L
BASOPHILS # BLD: 1 % (ref 0–2)
BASOPHILS ABSOLUTE: 0.1 K/UL (ref 0–0.2)
BILIRUB SERPL-MCNC: 0.9 MG/DL (ref 0.3–1.2)
BILIRUBIN DIRECT: 0.26 MG/DL
BILIRUBIN URINE: NEGATIVE
BILIRUBIN, INDIRECT: 0.64 MG/DL (ref 0–1)
BNP INTERPRETATION: ABNORMAL
BUN BLDV-MCNC: 14 MG/DL (ref 8–23)
BUN/CREAT BLD: ABNORMAL (ref 9–20)
CALCIUM SERPL-MCNC: 9.3 MG/DL (ref 8.6–10.4)
CHLORIDE BLD-SCNC: 103 MMOL/L (ref 98–107)
CO2: 31 MMOL/L (ref 20–31)
COLOR: YELLOW
COMMENT UA: NORMAL
CREAT SERPL-MCNC: 1.22 MG/DL (ref 0.7–1.2)
DIFFERENTIAL TYPE: ABNORMAL
EKG ATRIAL RATE: 51 BPM
EKG Q-T INTERVAL: 468 MS
EKG QRS DURATION: 124 MS
EKG QTC CALCULATION (BAZETT): 431 MS
EKG R AXIS: -16 DEGREES
EKG T AXIS: 122 DEGREES
EKG VENTRICULAR RATE: 51 BPM
EOSINOPHILS RELATIVE PERCENT: 0 % (ref 0–4)
GFR AFRICAN AMERICAN: >60 ML/MIN
GFR NON-AFRICAN AMERICAN: 57 ML/MIN
GFR SERPL CREATININE-BSD FRML MDRD: ABNORMAL ML/MIN/{1.73_M2}
GFR SERPL CREATININE-BSD FRML MDRD: ABNORMAL ML/MIN/{1.73_M2}
GLOBULIN: NORMAL G/DL (ref 1.5–3.8)
GLUCOSE BLD-MCNC: 118 MG/DL (ref 75–110)
GLUCOSE BLD-MCNC: 119 MG/DL (ref 70–99)
GLUCOSE URINE: NEGATIVE
HCT VFR BLD CALC: 38.4 % (ref 41–53)
HEMOGLOBIN: 13.3 G/DL (ref 13.5–17.5)
IMMATURE GRANULOCYTES: ABNORMAL %
KETONES, URINE: NEGATIVE
LEUKOCYTE ESTERASE, URINE: NEGATIVE
LIPASE: 26 U/L (ref 13–60)
LYMPHOCYTES # BLD: 6 % (ref 24–44)
MAGNESIUM: 2 MG/DL (ref 1.6–2.6)
MCH RBC QN AUTO: 31.6 PG (ref 26–34)
MCHC RBC AUTO-ENTMCNC: 34.7 G/DL (ref 31–37)
MCV RBC AUTO: 91.3 FL (ref 80–100)
MONOCYTES # BLD: 4 % (ref 1–7)
NITRITE, URINE: NEGATIVE
NRBC AUTOMATED: ABNORMAL PER 100 WBC
PDW BLD-RTO: 14.7 % (ref 11.5–14.9)
PH UA: 7 (ref 5–8)
PLATELET # BLD: 198 K/UL (ref 150–450)
PLATELET ESTIMATE: ABNORMAL
PMV BLD AUTO: 7.5 FL (ref 6–12)
POTASSIUM SERPL-SCNC: 3.3 MMOL/L (ref 3.7–5.3)
POTASSIUM SERPL-SCNC: 3.6 MMOL/L (ref 3.7–5.3)
PRO-BNP: 2302 PG/ML
PROTEIN UA: NEGATIVE
RBC # BLD: 4.21 M/UL (ref 4.5–5.9)
RBC # BLD: ABNORMAL 10*6/UL
SEG NEUTROPHILS: 89 % (ref 36–66)
SEGMENTED NEUTROPHILS ABSOLUTE COUNT: 9.8 K/UL (ref 1.3–9.1)
SODIUM BLD-SCNC: 144 MMOL/L (ref 135–144)
SPECIFIC GRAVITY UA: 1.01 (ref 1–1.03)
THYROXINE, FREE: 1.38 NG/DL (ref 0.93–1.7)
TOTAL CK: 96 U/L (ref 39–308)
TOTAL PROTEIN: 7 G/DL (ref 6.4–8.3)
TROPONIN INTERP: ABNORMAL
TROPONIN INTERP: ABNORMAL
TROPONIN T: ABNORMAL NG/ML
TROPONIN T: ABNORMAL NG/ML
TROPONIN, HIGH SENSITIVITY: 33 NG/L (ref 0–22)
TROPONIN, HIGH SENSITIVITY: 36 NG/L (ref 0–22)
TSH SERPL DL<=0.05 MIU/L-ACNC: 6.26 MIU/L (ref 0.3–5)
TURBIDITY: CLEAR
URINE HGB: NEGATIVE
UROBILINOGEN, URINE: NORMAL
WBC # BLD: 11.1 K/UL (ref 3.5–11)
WBC # BLD: ABNORMAL 10*3/UL

## 2020-10-22 PROCEDURE — 99284 EMERGENCY DEPT VISIT MOD MDM: CPT

## 2020-10-22 PROCEDURE — 83690 ASSAY OF LIPASE: CPT

## 2020-10-22 PROCEDURE — 2060000000 HC ICU INTERMEDIATE R&B

## 2020-10-22 PROCEDURE — 6370000000 HC RX 637 (ALT 250 FOR IP): Performed by: INTERNAL MEDICINE

## 2020-10-22 PROCEDURE — 83880 ASSAY OF NATRIURETIC PEPTIDE: CPT

## 2020-10-22 PROCEDURE — 84443 ASSAY THYROID STIM HORMONE: CPT

## 2020-10-22 PROCEDURE — 84439 ASSAY OF FREE THYROXINE: CPT

## 2020-10-22 PROCEDURE — 36415 COLL VENOUS BLD VENIPUNCTURE: CPT

## 2020-10-22 PROCEDURE — 6370000000 HC RX 637 (ALT 250 FOR IP): Performed by: STUDENT IN AN ORGANIZED HEALTH CARE EDUCATION/TRAINING PROGRAM

## 2020-10-22 PROCEDURE — 83735 ASSAY OF MAGNESIUM: CPT

## 2020-10-22 PROCEDURE — 97162 PT EVAL MOD COMPLEX 30 MIN: CPT

## 2020-10-22 PROCEDURE — 70450 CT HEAD/BRAIN W/O DYE: CPT

## 2020-10-22 PROCEDURE — 82947 ASSAY GLUCOSE BLOOD QUANT: CPT

## 2020-10-22 PROCEDURE — 93010 ELECTROCARDIOGRAM REPORT: CPT | Performed by: INTERNAL MEDICINE

## 2020-10-22 PROCEDURE — 72125 CT NECK SPINE W/O DYE: CPT

## 2020-10-22 PROCEDURE — 80076 HEPATIC FUNCTION PANEL: CPT

## 2020-10-22 PROCEDURE — 71046 X-RAY EXAM CHEST 2 VIEWS: CPT

## 2020-10-22 PROCEDURE — 80048 BASIC METABOLIC PNL TOTAL CA: CPT

## 2020-10-22 PROCEDURE — 85025 COMPLETE CBC W/AUTO DIFF WBC: CPT

## 2020-10-22 PROCEDURE — 84132 ASSAY OF SERUM POTASSIUM: CPT

## 2020-10-22 PROCEDURE — 82550 ASSAY OF CK (CPK): CPT

## 2020-10-22 PROCEDURE — 93005 ELECTROCARDIOGRAM TRACING: CPT | Performed by: EMERGENCY MEDICINE

## 2020-10-22 PROCEDURE — 99223 1ST HOSP IP/OBS HIGH 75: CPT | Performed by: INTERNAL MEDICINE

## 2020-10-22 PROCEDURE — 84484 ASSAY OF TROPONIN QUANT: CPT

## 2020-10-22 PROCEDURE — 2580000003 HC RX 258: Performed by: STUDENT IN AN ORGANIZED HEALTH CARE EDUCATION/TRAINING PROGRAM

## 2020-10-22 PROCEDURE — 81003 URINALYSIS AUTO W/O SCOPE: CPT

## 2020-10-22 RX ORDER — POTASSIUM CHLORIDE 20 MEQ/1
40 TABLET, EXTENDED RELEASE ORAL ONCE
Status: COMPLETED | OUTPATIENT
Start: 2020-10-22 | End: 2020-10-22

## 2020-10-22 RX ORDER — METOPROLOL SUCCINATE 50 MG/1
50 TABLET, EXTENDED RELEASE ORAL 2 TIMES DAILY
Status: DISCONTINUED | OUTPATIENT
Start: 2020-10-22 | End: 2020-10-22

## 2020-10-22 RX ORDER — METOPROLOL SUCCINATE 100 MG/1
100 TABLET, EXTENDED RELEASE ORAL 2 TIMES DAILY
Status: DISCONTINUED | OUTPATIENT
Start: 2020-10-22 | End: 2020-10-22

## 2020-10-22 RX ORDER — POTASSIUM CHLORIDE 7.45 MG/ML
10 INJECTION INTRAVENOUS PRN
Status: DISCONTINUED | OUTPATIENT
Start: 2020-10-22 | End: 2020-10-24 | Stop reason: HOSPADM

## 2020-10-22 RX ORDER — SODIUM CHLORIDE 9 MG/ML
INJECTION, SOLUTION INTRAVENOUS CONTINUOUS
Status: DISCONTINUED | OUTPATIENT
Start: 2020-10-22 | End: 2020-10-22

## 2020-10-22 RX ORDER — METOPROLOL SUCCINATE 25 MG/1
25 TABLET, EXTENDED RELEASE ORAL DAILY
Status: DISCONTINUED | OUTPATIENT
Start: 2020-10-22 | End: 2020-10-24 | Stop reason: HOSPADM

## 2020-10-22 RX ORDER — HYDRALAZINE HYDROCHLORIDE 20 MG/ML
10 INJECTION INTRAMUSCULAR; INTRAVENOUS EVERY 6 HOURS PRN
Status: DISCONTINUED | OUTPATIENT
Start: 2020-10-22 | End: 2020-10-24 | Stop reason: HOSPADM

## 2020-10-22 RX ORDER — ACETAMINOPHEN 650 MG/1
650 SUPPOSITORY RECTAL EVERY 6 HOURS PRN
Status: DISCONTINUED | OUTPATIENT
Start: 2020-10-22 | End: 2020-10-24 | Stop reason: HOSPADM

## 2020-10-22 RX ORDER — ASPIRIN 81 MG/1
81 TABLET, CHEWABLE ORAL DAILY
Status: DISCONTINUED | OUTPATIENT
Start: 2020-10-22 | End: 2020-10-22

## 2020-10-22 RX ORDER — SPIRONOLACTONE 25 MG/1
25 TABLET ORAL DAILY
Status: DISCONTINUED | OUTPATIENT
Start: 2020-10-22 | End: 2020-10-23

## 2020-10-22 RX ORDER — LEVOTHYROXINE SODIUM 0.07 MG/1
75 TABLET ORAL DAILY
Status: DISCONTINUED | OUTPATIENT
Start: 2020-10-22 | End: 2020-10-24 | Stop reason: HOSPADM

## 2020-10-22 RX ORDER — LISINOPRIL 5 MG/1
2.5 TABLET ORAL
Status: DISCONTINUED | OUTPATIENT
Start: 2020-10-23 | End: 2020-10-24 | Stop reason: HOSPADM

## 2020-10-22 RX ORDER — FLUOXETINE HYDROCHLORIDE 20 MG/1
40 CAPSULE ORAL DAILY
Status: DISCONTINUED | OUTPATIENT
Start: 2020-10-22 | End: 2020-10-23

## 2020-10-22 RX ORDER — LISINOPRIL 5 MG/1
2.5 TABLET ORAL ONCE
Status: COMPLETED | OUTPATIENT
Start: 2020-10-22 | End: 2020-10-22

## 2020-10-22 RX ORDER — POTASSIUM CHLORIDE 20 MEQ/1
20 TABLET, EXTENDED RELEASE ORAL
Status: DISCONTINUED | OUTPATIENT
Start: 2020-10-23 | End: 2020-10-24 | Stop reason: HOSPADM

## 2020-10-22 RX ORDER — ACETAMINOPHEN 325 MG/1
650 TABLET ORAL EVERY 6 HOURS PRN
Status: DISCONTINUED | OUTPATIENT
Start: 2020-10-22 | End: 2020-10-24 | Stop reason: HOSPADM

## 2020-10-22 RX ORDER — POTASSIUM CHLORIDE 20 MEQ/1
40 TABLET, EXTENDED RELEASE ORAL PRN
Status: DISCONTINUED | OUTPATIENT
Start: 2020-10-22 | End: 2020-10-24 | Stop reason: HOSPADM

## 2020-10-22 RX ORDER — PROMETHAZINE HYDROCHLORIDE 25 MG/1
12.5 TABLET ORAL EVERY 6 HOURS PRN
Status: DISCONTINUED | OUTPATIENT
Start: 2020-10-22 | End: 2020-10-24 | Stop reason: HOSPADM

## 2020-10-22 RX ORDER — ATORVASTATIN CALCIUM 80 MG/1
80 TABLET, FILM COATED ORAL DAILY
Status: DISCONTINUED | OUTPATIENT
Start: 2020-10-22 | End: 2020-10-24 | Stop reason: HOSPADM

## 2020-10-22 RX ORDER — SODIUM CHLORIDE 0.9 % (FLUSH) 0.9 %
10 SYRINGE (ML) INJECTION PRN
Status: DISCONTINUED | OUTPATIENT
Start: 2020-10-22 | End: 2020-10-24 | Stop reason: HOSPADM

## 2020-10-22 RX ORDER — SODIUM CHLORIDE 0.9 % (FLUSH) 0.9 %
10 SYRINGE (ML) INJECTION EVERY 12 HOURS SCHEDULED
Status: DISCONTINUED | OUTPATIENT
Start: 2020-10-22 | End: 2020-10-24 | Stop reason: HOSPADM

## 2020-10-22 RX ORDER — TRAZODONE HYDROCHLORIDE 50 MG/1
50 TABLET ORAL NIGHTLY PRN
Status: DISCONTINUED | OUTPATIENT
Start: 2020-10-22 | End: 2020-10-23

## 2020-10-22 RX ORDER — LISINOPRIL 5 MG/1
2.5 TABLET ORAL DAILY
Status: DISCONTINUED | OUTPATIENT
Start: 2020-10-22 | End: 2020-10-22

## 2020-10-22 RX ORDER — ONDANSETRON 2 MG/ML
4 INJECTION INTRAMUSCULAR; INTRAVENOUS EVERY 6 HOURS PRN
Status: DISCONTINUED | OUTPATIENT
Start: 2020-10-22 | End: 2020-10-24 | Stop reason: HOSPADM

## 2020-10-22 RX ORDER — BUPROPION HYDROCHLORIDE 150 MG/1
150 TABLET, EXTENDED RELEASE ORAL 2 TIMES DAILY
Status: DISCONTINUED | OUTPATIENT
Start: 2020-10-22 | End: 2020-10-24 | Stop reason: HOSPADM

## 2020-10-22 RX ORDER — POLYETHYLENE GLYCOL 3350 17 G/17G
17 POWDER, FOR SOLUTION ORAL DAILY PRN
Status: DISCONTINUED | OUTPATIENT
Start: 2020-10-22 | End: 2020-10-24 | Stop reason: HOSPADM

## 2020-10-22 RX ORDER — FUROSEMIDE 40 MG/1
40 TABLET ORAL 2 TIMES DAILY
Status: DISCONTINUED | OUTPATIENT
Start: 2020-10-22 | End: 2020-10-23

## 2020-10-22 RX ADMIN — LISINOPRIL 2.5 MG: 5 TABLET ORAL at 12:09

## 2020-10-22 RX ADMIN — POTASSIUM CHLORIDE 40 MEQ: 1500 TABLET, EXTENDED RELEASE ORAL at 12:07

## 2020-10-22 RX ADMIN — Medication 10 ML: at 10:38

## 2020-10-22 RX ADMIN — SPIRONOLACTONE 25 MG: 25 TABLET, FILM COATED ORAL at 10:37

## 2020-10-22 RX ADMIN — ASPIRIN 81 MG: 81 TABLET, CHEWABLE ORAL at 10:36

## 2020-10-22 RX ADMIN — BUPROPION HYDROCHLORIDE 150 MG: 150 TABLET, EXTENDED RELEASE ORAL at 10:37

## 2020-10-22 RX ADMIN — POTASSIUM BICARBONATE 20 MEQ: 782 TABLET, EFFERVESCENT ORAL at 08:28

## 2020-10-22 RX ADMIN — APIXABAN 5 MG: 5 TABLET, FILM COATED ORAL at 10:37

## 2020-10-22 RX ADMIN — FLUOXETINE HYDROCHLORIDE 40 MG: 20 CAPSULE ORAL at 10:37

## 2020-10-22 RX ADMIN — Medication 10 ML: at 21:26

## 2020-10-22 RX ADMIN — METOPROLOL SUCCINATE 25 MG: 25 TABLET, EXTENDED RELEASE ORAL at 12:07

## 2020-10-22 RX ADMIN — FUROSEMIDE 40 MG: 40 TABLET ORAL at 18:24

## 2020-10-22 RX ADMIN — BUPROPION HYDROCHLORIDE 150 MG: 150 TABLET, EXTENDED RELEASE ORAL at 21:19

## 2020-10-22 RX ADMIN — LEVOTHYROXINE SODIUM 75 MCG: 75 TABLET ORAL at 10:37

## 2020-10-22 RX ADMIN — APIXABAN 5 MG: 5 TABLET, FILM COATED ORAL at 21:19

## 2020-10-22 RX ADMIN — ATORVASTATIN CALCIUM 80 MG: 80 TABLET, FILM COATED ORAL at 10:37

## 2020-10-22 ASSESSMENT — ENCOUNTER SYMPTOMS
ABDOMINAL PAIN: 0
COUGH: 0
SHORTNESS OF BREATH: 0
VOMITING: 1
DIARRHEA: 1
BACK PAIN: 0
NAUSEA: 1

## 2020-10-22 ASSESSMENT — PAIN SCALES - GENERAL
PAINLEVEL_OUTOF10: 0
PAINLEVEL_OUTOF10: 0

## 2020-10-22 NOTE — ED NOTES
Mode of arrival (squad #, walk in, police, etc) : [place]        Chief complaint(s): weakness        Arrival Note (brief scenario, treatment PTA, etc). : Pt arrives tot he ED with the complaint of feeling weak. Pt states he felt like he needed to poop but when he sat down was unable. Pt states he was so weak he ended up on the floor. Pt is not sure if he fell but has no pain or injuries. Pt states he was on the floor for 3 hours before his life noticed and came. Pt states he called EMS because he was unable to get up on his own, but states that currently he doesn't have many complaints besides the overall fatigue. Pt states that he did have an episode of vomiting but has not had once since. Pt is A/O x 4        C= \"Have you ever felt that you should Cut down on your drinking? \"  No  A= \"Have people Annoyed you by criticizing your drinking? \"  No  G= \"Have you ever felt bad or Guilty about your drinking? \"  No  E= \"Have you ever had a drink as an Eye-opener first thing in the morning to steady your nerves or to help a hangover? \"  No      Deferred []      Reason for deferring: N/A    *If yes to two or more: probable alcohol abuse. Michelle Mcrae RN  10/22/20 0574

## 2020-10-22 NOTE — FLOWSHEET NOTE
Pt is retired  with 39 years of service. Pt said that he had a terrible night at home and his wife who can not drive which hinders her to come to see him. 10/22/20 1550   Encounter Summary   Services provided to: Patient   Referral/Consult From: [facility]   (10-22-20)   Complexity of Encounter Moderate   Length of Encounter 15 minutes   Spiritual Assessment Completed Yes   Spiritual/Scientology   Type Spiritual support   Assessment Approachable; Hopeful;Peaceful   Intervention Active listening;Explored feelings, thoughts, concerns;Prayer;Sustaining presence/ Ministry of presence   Outcome Comfort;Expressed gratitude;Expressed feelings/needs/concerns

## 2020-10-22 NOTE — FLOWSHEET NOTE
10/22/20 1106   Provider Notification   Reason for Communication New orders   Provider Name Dr. Delmis Krishnan   Provider Notification Physician   Method of Communication Call   Response No new orders   Notification Time 1106     Dr. Delmis Krishnan updated on the patient's admission and current home medications, see orders placed.

## 2020-10-22 NOTE — PROGRESS NOTES
Physical Therapy    Facility/Department: [address] [place] [address] CARE  Initial Assessment    NAME: Mario Alberto Cummings  : 1935  MRN: [id number]    Date of Service: 10/22/2020    Discharge Recommendations:  Patient would benefit from continued therapy after discharge        Assessment   Body structures, Functions, Activity limitations: Decreased functional mobility ; Decreased safe awareness;Decreased balance;Decreased strength;Decreased posture  Assessment: Pt has limited tolerance of session d/t weakness and dizziness. Pt is a high fall risk. Cont per POC to prepare for d/c. Treatment Diagnosis: impaired mobility d/t syncope and collapse  Specific instructions for Next Treatment: monitor vitals  Prognosis: Good  Decision Making: Medium Complexity  History: This is an 42-year-old male with a history of atrial fibrillation on Eliquis who was cardioverted about a month ago secondary to A. fib with RVR, heart failure with an ejection fraction of 25% who comes in today. The patient states that he was in his normal state of health. He woke up in the middle of the night and thought that he had to have a bowel movement. And so he went to the restroom and then he woke up on the floor of the bathroom. Patient states he does not know how he got there. Patient states that he was on the bathroom floor for about 3 hours he kept trying to get up but felt lightheaded whenever he tried to sit up and so he had to keep lying down. He says that his wife is very hard of hearing and it took a while for her to get up and then he was unable to rise from the floor and so that is why they called the ambulance.   Exam: ROM, MMT, balance, social hx, sensation, mobility, orthostatic BP  Clinical Presentation: fatigue, dizziness  PT Education: PT Role;Plan of Care;General Safety;Precautions  Barriers to Learning: none  REQUIRES PT FOLLOW UP: Yes  Activity Tolerance  Activity Tolerance: Patient limited by endurance  Activity Tolerance: Seated BP 138/69 and HR 60 bpm, Standing /63 and HR 66 bpm       Patient Diagnosis(es): The primary encounter diagnosis was Syncope and collapse. A diagnosis of Congestive heart failure, unspecified HF chronicity, unspecified heart failure type [facility]) was also pertinent to this visit. has a past medical history of A-fib [facility]), CVA (cerebral vascular accident) ([address] and Myocardial infarct, old.   has a past surgical history that includes Cardioversion (N/A, 9/24/2020). Restrictions  Restrictions/Precautions  Restrictions/Precautions: Fall Risk  Required Braces or Orthoses?: No  Position Activity Restriction  Other position/activity restrictions: Afib, recent cardioversion 9/24/20, telemetry, IV  Vision/Hearing  Vision: Impaired  Vision Exceptions: Wears glasses at all times  Hearing: Within functional limits     Subjective  General  Chart Reviewed: Yes  Patient assessed for rehabilitation services?: Yes  Additional Pertinent Hx: Pt presents to ER s/p episode of weakness and lightheadedness while ambulating to bathroom in the night. Pt denies fall; states his UEs and LEs were weak and he eased himself down to floor. Pt was down for 3 hrs and unable to get self up. CT head and Cspine (-), CXR (-). Response To Previous Treatment: Not applicable  Family / Caregiver Present: No  Diagnosis: syncope and collapse  Follows Commands: Within Functional Limits  Other (Comment): Yandy Parra RN ok's session, states pt had (+) orthostatic BPs earlier today. Subjective  Subjective: Pt is sleeping in bed and agreeable to PT. Denies fall, states he experienced sudden onset of weakness.   Pain Screening  Patient Currently in Pain: Denies  Vital Signs  Patient Currently in Pain: Denies  Pre Treatment Pain Screening  Intervention List: Patient able to continue with treatment    Orientation  Orientation  Overall Orientation Status: Within Normal Limits  Social/Functional History  Social/Functional History  Lives With: Spouse  Type of Home: House  Home Layout: One level  Home Access: Stairs to enter without rails  Entrance Stairs - Number of Steps: 2  Bathroom Shower/Tub: Walk-in shower(with built in seat)  Bathroom Toilet: Handicap height  Bathroom Equipment: Grab bars in shower  Bathroom Accessibility: Accessible  Home Equipment: Rolling walker, Cane(pt uses walking stick when outside of home)  ADL Assistance: [facility] Responsibilities: No(has not had house making responsibilites since being dx with Afib.)  Ambulation Assistance: Independent  Transfer Assistance: Independent  Active : Yes  Mode of Transportation: Car(states he has a little difficulty getting in and out d/t pt's height)  Occupation: Retired  Additional Comments: Home health care 1x/wk, home PT 2x/wk  Cognition        Objective     Observation/Palpation  Observation: dry abrasion noted on R olecranon process, telemetry, IV  Edema: none noted    AROM RLE (degrees)  RLE AROM: WFL  AROM LLE (degrees)  LLE AROM : WFL  AROM RUE (degrees)  RUE AROM : WFL  AROM LUE (degrees)  LUE AROM : WFL  Strength RLE  Comment: grossly 4/5  Strength LLE  Comment: grossly 4/5  Strength RUE  Comment: grossly 4/5  Strength LUE  Comment: grossly 4/5     Sensation  Overall Sensation Status: WFL  Bed mobility  Supine to Sit: Minimal assistance  Sit to Supine: Stand by assistance  Transfers  Sit to Stand: Minimal Assistance  Stand to sit: Contact guard assistance  Ambulation  Ambulation?: Yes  More Ambulation?: No  Ambulation 1  Surface: level tile  Device: Hand-Held Assist  Assistance: Contact guard assistance  Quality of Gait: fwd head, flexed hips and knees, unsteadiness, reaches for stability  Gait Deviations: Staggers  Distance: 5 ft x2  Comments: Ambulates bed <> toilet  Stairs/Curb  Stairs?: No     Balance  Posture: Fair  Sitting - Static: Fair;+  Sitting - Dynamic: Fair;+  Standing - Static: Fair;-  Standing - Dynamic: Fair;-        Plan   Plan  Times per week: 6-7x  Times per day:

## 2020-10-22 NOTE — ED PROVIDER NOTES
EMERGENCY DEPARTMENT ENCOUNTER    Pt Name: Jaylen Urbano  MRN: [id number]  [place] 1935  Date of evaluation: 10/22/20  CHIEF COMPLAINT       Chief Complaint   Patient presents with    Fatigue    Emesis     HISTORY OF PRESENT ILLNESS   HPI    This is an 82-year-old male with a history of atrial fibrillation on Eliquis who was cardioverted about a month ago secondary to A. fib with RVR, heart failure with an ejection fraction of 25% who comes in today. The patient states that he was in his normal state of health. He woke up in the middle of the night and thought that he had to have a bowel movement. And so he went to the restroom and then he woke up on the floor of the bathroom. Patient states he does not know how he got there. Patient states that he was on the bathroom floor for about 3 hours he kept trying to get up but felt lightheaded whenever he tried to sit up and so he had to keep lying down. He says that his wife is very hard of hearing and it took a while for her to get up and then he was unable to rise from the floor and so that is why they called the ambulance. Patient states that he did have one episode of emesis that was nonbloody. He no longer feels like he wants to vomit. He denies any abdominal pain. The patient denies any chest pain shortness of breath fevers or chills. Says that he has a mild cough. No burning with urination. No history of similar symptoms no history of syncope. REVIEW OF SYSTEMS     Review of Systems   Constitutional: Negative for fever. HENT: Negative for congestion. Respiratory: Negative for cough and shortness of breath. Cardiovascular: Negative for chest pain. Gastrointestinal: Positive for diarrhea, nausea and vomiting. Negative for abdominal pain. Genitourinary: Negative for dysuria. Musculoskeletal: Negative for back pain. Skin: Negative for rash. Neurological: Positive for syncope. Negative for headaches.    All other systems reviewed and are negative. PASTMEDICAL HISTORY     Past Medical History:   Diagnosis Date    A-fib [facility])     CVA (cerebral vascular accident) ([address]     Myocardial infarct, old      SURGICAL HISTORY       Past Surgical History:   Procedure Laterality Date    CARDIOVERSION N/A 9/24/2020    CARDIOVERSION performed by Linda Cardoza MD at [facility]       Previous Medications    AMIODARONE (PACERONE) 400 MG TABLET    Take 1 tablet by mouth daily    APIXABAN (ELIQUIS) 5 MG TABS TABLET    Take 1 tablet by mouth 2 times daily    ASPIRIN 81 MG CHEWABLE TABLET    Take 1 tablet by mouth daily    ATORVASTATIN (LIPITOR) 80 MG TABLET    Take 80 mg by mouth daily    BUPROPION (WELLBUTRIN SR) 150 MG EXTENDED RELEASE TABLET    Take 150 mg by mouth 2 times daily    FLUOXETINE (PROZAC) 20 MG CAPSULE    Take 40 mg by mouth daily     FUROSEMIDE (LASIX) 40 MG TABLET    Take 1 tablet by mouth 2 times daily    LEVOTHYROXINE (SYNTHROID) 75 MCG TABLET    Take 75 mcg by mouth daily    LISINOPRIL (PRINIVIL;ZESTRIL) 2.5 MG TABLET    Take 1 tablet by mouth daily    METOPROLOL SUCCINATE (TOPROL XL) 100 MG EXTENDED RELEASE TABLET    Take 1 tablet by mouth 2 times daily    SPIRONOLACTONE (ALDACTONE) 25 MG TABLET    Take 1 tablet by mouth daily    TRAZODONE (DESYREL) 50 MG TABLET    Take 25-75 mg by mouth nightly as needed for Sleep     ALLERGIES     has No Known Allergies. FAMILY HISTORY     has no family status information on file. SOCIAL HISTORY       Social History     Tobacco Use    Smoking status: Never Smoker    Smokeless tobacco: Never Used   Substance Use Topics    Alcohol use: Not on file    Drug use: Not on file     PHYSICAL EXAM     INITIAL VITALS: BP (!) 158/80   Pulse 52   Temp 98.1 °F (36.7 °C) (Oral)   Resp 18   Ht 6' 6\" (1.981 m)   Wt 204 lb (92.5 kg)   SpO2 98%   BMI 23.57 kg/m²    Physical Exam  Vitals signs and nursing note reviewed.    Constitutional:       General: He is not in acute

## 2020-10-22 NOTE — PLAN OF CARE
Problem: Falls - Risk of:  Goal: Will remain free from falls  Description: Will remain free from falls  Outcome: Met This Shift  Note: The patient remained free from falls this shift, call light within reach, bed in locked and lowest position. Side rails up x2. Continue to monitor closely. Problem: Cardiac:  Goal: Ability to maintain vital signs within normal range will improve  Description: Ability to maintain vital signs within normal range will improve  Outcome: Ongoing  Note: Orthostatic blood pressures performed. Problem: Health Behavior:  Goal: Will modify at least one risk factor affecting health status  Description: Will modify at least one risk factor affecting health status  Outcome: Ongoing  Note: Bed alarm in place; patient will be standby assistance for any ambulation.

## 2020-10-22 NOTE — FLOWSHEET NOTE
10/22/20 1041   Provider Notification   Reason for Communication New orders   Provider Name Dr. Geraldine Schuster   Provider Notification Physician   Method of Communication Face to face   Response At bedside   Notification Time  with Dr. Geraldine Schuster regarding the patient's admission, at this time orders received to decrease Toprol XL to 50 mg and okay to give lisinopril. Order compression stockings. Addendum: Dr. Geraldine Schuster is not the cardiologist of record for this patient, therefore the orders have been canceled and the appropriate cardiology group has been added, awaiting their input for cardiology medications.

## 2020-10-22 NOTE — H&P
[address]     HISTORY AND PHYSICAL EXAMINATION            Date:   10/22/2020  Patient name:  Jami Holland  Date of admission:  10/22/2020  6:21 AM  MRN:   [id number]  Account:  [de-identified]  YOB: 1935  PCP:    Melvin Brown MD  Room:   2092/2092-01  Code Status:    Full Code    Chief Complaint:     Chief Complaint   Patient presents with    Fatigue    Emesis   This morning    History Obtained From:     patient, electronic medical record. History of Present Illness: The patient is a 82 y.o. Non-/non  male who presents withFatigue and Emesis    82years old male patient with a past medical history of atrial fibrillation on Eliquis, cardioversion about a month ago secondary to atrial fibrillation with RVR, heart failure with an ejection fraction 25% in April 2020    Patient was doing well until this morning at about 2 AM when went to the bathroom to pass a bowel motion. He felt a sudden weakness in the upper and lower limbs and all his limbs just gave away. There was no LOC nor he fell or sustain any trauma. He was on the floor and wasn't able to stand up and described \"I was too weak in my thighs\". He tried to call his wife but she couldn't hear him. He was on the floor for about 3 hours and tried to sleep. He felt nauseous and had one episode of vomiting (non-bloody). There was no alarming symptoms and this is his first episode of such an event. No shortness of breath, no chest pain, no palpitations. No abdominal pain, no urinary symptoms.      Past Medical History:     Past Medical History:   Diagnosis Date    A-fib [facility])     CVA (cerebral vascular accident) ([facility] [address]     Myocardial infarct, old       Past SurgicalHistory:     Past Surgical History:   Procedure Laterality Date    CARDIOVERSION N/A 9/24/2020 CARDIOVERSION performed by Mahamed Edward MD at [address]      Medications Prior to Admission:     Prior to Admission medications    Medication Sig Start Date End Date Taking? Authorizing Provider   amiodarone (PACERONE) 400 MG tablet Take 1 tablet by mouth daily 9/25/20  Yes Rima Callejas MD   metoprolol succinate (TOPROL XL) 100 MG extended release tablet Take 1 tablet by mouth 2 times daily 9/25/20  Yes Rima Callejas MD   lisinopril (PRINIVIL;ZESTRIL) 2.5 MG tablet Take 1 tablet by mouth daily 9/26/20  Yes Rima Callejas MD   apixaban (ELIQUIS) 5 MG TABS tablet Take 1 tablet by mouth 2 times daily 4/20/20  Yes Gilford Meth, MD   furosemide (LASIX) 40 MG tablet Take 1 tablet by mouth 2 times daily 4/20/20  Yes Gilford Meth, MD   spironolactone (ALDACTONE) 25 MG tablet Take 1 tablet by mouth daily 4/21/20  Yes Gilford Meth, MD   aspirin 81 MG chewable tablet Take 1 tablet by mouth daily 4/21/20  Yes Gilford Meth, MD   FLUoxetine (PROZAC) 20 MG capsule Take 40 mg by mouth daily  3/24/20  Yes Historical Provider, MD   atorvastatin (LIPITOR) 80 MG tablet Take 80 mg by mouth daily 1/1/20  Yes Historical Provider, MD   buPROPion [facility] SR) 150 MG extended release tablet Take 150 mg by mouth 2 times daily 2/16/20  Yes Historical Provider, MD   levothyroxine (SYNTHROID) 75 MCG tablet Take 75 mcg by mouth daily 8/23/19  Yes Historical Provider, MD   traZODone (DESYREL) 50 MG tablet Take 25-75 mg by mouth nightly as needed for Sleep 3/24/20  Yes Historical Provider, MD        Allergies:     Patient has no known allergies. Social History:     Tobacco:    reports that he has never smoked. He has never used smokeless tobacco.  Alcohol:      has no history on file for alcohol. Drug Use:  has no history on file for drug. Family History:     History reviewed. No pertinent family history. Review of Systems:     Positive and Negative as described in HPI.     CONSTITUTIONAL:  no fevers  EYES: negative for blury vision  HEENT: No headaches, no nasal congestion, no difficulty swallowing  RESPIRATORY:negative for dyspnea, no wheezing, no Cough  CARDIOVASCULAR: negative for chest pain, no palpitations  GASTROINTESTINAL: no nausea, +ve vomiting, no change in bowel habits, no abdominal pain   GENITOURINARY: negative for dysuria, no hematuria   MUSCULOSKELETAL: no joint pains, no muscle aches, no swelling of joints or extremities  NEUROLOGICAL: No weakness or numbness. Physical Exam:   BP (!) 125/59   Pulse 65   Temp 97.9 °F (36.6 °C) (Oral)   Resp 14   Ht 6' 6\" (1.981 m)   Wt 198 lb 6.6 oz (90 kg)   SpO2 98%   BMI 22.93 kg/m²   Temp (24hrs), Av.9 °F (36.6 °C), Min:97.7 °F (36.5 °C), Max:98.1 °F (36.7 °C)    Recent Labs     10/22/20  0637   POCGLU 118*       Intake/Output Summary (Last 24 hours) at 10/22/2020 1836  Last data filed at 10/22/2020 1824  Gross per 24 hour   Intake 525 ml   Output --   Net 525 ml       PHYSICAL EXAM:  General Appearance  Alert , awake, not in acute distress  HEENT - Head is normocephalic, atraumatic. Lungs - Bilateral equal air entry, no wheezes, rales or rhonchi, aeration good  Cardiovascular - Heart sounds are normal.  Regular rhythm, normal rate without murmur, gallop or rub.   Abdomen - Soft, nontender, nondistended, no masses or organomegaly  Neurologic - There are no new focal motor or sensory deficits  Skin - No bruising or bleeding on exposed skin area  Extremities - No cyanosis, clubbing or edema      Investigations:     Laboratory Testing:  Recent Results (from the past 24 hour(s))   POC Glucose Fingerstick    Collection Time: 10/22/20  6:37 AM   Result Value Ref Range    POC Glucose 118 (H) 75 - 110 mg/dL   CBC Auto Differential    Collection Time: 10/22/20  6:43 AM   Result Value Ref Range    WBC 11.1 (H) 3.5 - 11.0 k/uL    RBC 4.21 (L) 4.5 - 5.9 m/uL    Hemoglobin 13.3 (L) 13.5 - 17.5 g/dL    Hematocrit 38.4 (L) 41 - 53 %    MCV 91.3 80 - 100 fL    MCH 31.6 26 - 34 pg    MCHC 34.7 31 - 37 g/dL    RDW 14.7 11.5 - 14.9 %    Platelets 922 068 - 782 k/uL    MPV 7.5 6.0 - 12.0 fL    NRBC Automated NOT REPORTED per 100 WBC    Differential Type NOT REPORTED     Seg Neutrophils 89 (H) 36 - 66 %    Lymphocytes 6 (L) 24 - 44 %    Monocytes 4 1 - 7 %    Eosinophils % 0 0 - 4 %    Basophils 1 0 - 2 %    Immature Granulocytes NOT REPORTED 0 %    Segs Absolute 9.80 (H) 1.3 - 9.1 k/uL    Absolute Lymph # 0.70 (L) 1.0 - 4.8 k/uL    Absolute Mono # 0.50 0.1 - 1.3 k/uL    Absolute Eos # 0.00 0.0 - 0.4 k/uL    Basophils Absolute 0.10 0.0 - 0.2 k/uL    Absolute Immature Granulocyte NOT REPORTED 0.00 - 0.30 k/uL    WBC Morphology NOT REPORTED     RBC Morphology NOT REPORTED     Platelet Estimate NOT REPORTED    Basic Metabolic Panel    Collection Time: 10/22/20  6:43 AM   Result Value Ref Range    Glucose 119 (H) 70 - 99 mg/dL    BUN 14 8 - 23 mg/dL    CREATININE 1.22 (H) 0.70 - 1.20 mg/dL    Bun/Cre Ratio NOT REPORTED 9 - 20    Calcium 9.3 8.6 - 10.4 mg/dL    Sodium 144 135 - 144 mmol/L    Potassium 3.3 (L) 3.7 - 5.3 mmol/L    Chloride 103 98 - 107 mmol/L    CO2 31 20 - 31 mmol/L    Anion Gap 10 9 - 17 mmol/L    GFR Non-African American 57 (L) >60 mL/min    GFR African American >60 >60 mL/min    GFR Comment          GFR Staging NOT REPORTED    Brain Natriuretic Peptide    Collection Time: 10/22/20  6:43 AM   Result Value Ref Range    Pro-BNP 2,302 (H) <300 pg/mL    BNP Interpretation Pro-BNP Reference Range:    Troponin    Collection Time: 10/22/20  6:43 AM   Result Value Ref Range    Troponin, High Sensitivity 36 (H) 0 - 22 ng/L    Troponin T NOT REPORTED <0.03 ng/mL    Troponin Interp NOT REPORTED    Magnesium    Collection Time: 10/22/20  6:43 AM   Result Value Ref Range    Magnesium 2.0 1.6 - 2.6 mg/dL   Lipase    Collection Time: 10/22/20  6:43 AM   Result Value Ref Range    Lipase 26 13 - 60 U/L   Liver Profile    Collection Time: 10/22/20  6:43 AM   Result Value Ref Range    Alb 3.7 3.5 - 5.2 g/dL    Alkaline Phosphatase 91 40 - 129 U/L    ALT 23 5 - 41 U/L    AST 28 <40 U/L    Total Bilirubin 0.90 0.3 - 1.2 mg/dL    Bilirubin, Direct 0.26 <0.31 mg/dL    Bilirubin, Indirect 0.64 0.00 - 1.00 mg/dL    Total Protein 7.0 6.4 - 8.3 g/dL    Globulin NOT REPORTED 1.5 - 3.8 g/dL    Albumin/Globulin Ratio NOT REPORTED 1.0 - 2.5   Creatine Kinase    Collection Time: 10/22/20  6:43 AM   Result Value Ref Range    Total CK 96 39 - 308 U/L   EKG 12 Lead    Collection Time: 10/22/20  6:47 AM   Result Value Ref Range    Ventricular Rate 51 BPM    Atrial Rate 51 BPM    QRS Duration 124 ms    Q-T Interval 468 ms    QTc Calculation (Bazett) 431 ms    R Axis -16 degrees    T Axis 122 degrees   Urinalysis    Collection Time: 10/22/20  7:00 AM   Result Value Ref Range    Color, UA YELLOW YELLOW    Turbidity UA CLEAR CLEAR    Glucose, Ur NEGATIVE NEGATIVE    Bilirubin Urine NEGATIVE NEGATIVE    Ketones, Urine NEGATIVE NEGATIVE    Specific [place], UA 1.013 1.000 - 1.030    Urine Hgb NEGATIVE NEGATIVE    pH, UA 7.0 5.0 - 8.0    Protein, UA NEGATIVE NEGATIVE    Urobilinogen, Urine Normal Normal    Nitrite, Urine NEGATIVE NEGATIVE    Leukocyte Esterase, Urine NEGATIVE NEGATIVE    Urinalysis Comments       Microscopic exam not performed based on chemical results unless requested in original order.    Troponin    Collection Time: 10/22/20  8:30 AM   Result Value Ref Range    Troponin, High Sensitivity 33 (H) 0 - 22 ng/L    Troponin T NOT REPORTED <0.03 ng/mL    Troponin Interp NOT REPORTED    TSH with Reflex    Collection Time: 10/22/20  8:30 AM   Result Value Ref Range    TSH 6.26 (H) 0.30 - 5.00 mIU/L   T4, Free    Collection Time: 10/22/20  8:30 AM   Result Value Ref Range    Thyroxine, Free 1.38 0.93 - 1.70 ng/dL   K (Potassium)    Collection Time: 10/22/20  5:44 PM   Result Value Ref Range    Potassium 3.6 (L) 3.7 - 5.3 mmol/L         Current Facility-Administered Medications   Medication Dose Route Frequency Provider Last Rate Last Dose    apixaban (ELIQUIS) tablet 5 mg  5 mg Oral BID Jesus Hood MD   5 mg at 10/22/20 1037    atorvastatin (LIPITOR) tablet 80 mg  80 mg Oral Daily Jesus Hood MD   80 mg at 10/22/20 1037    buPROPion [facility] SR) extended release tablet 150 mg  150 mg Oral BID Jesus Hood MD   150 mg at 10/22/20 1037    FLUoxetine (PROZAC) capsule 40 mg  40 mg Oral Daily Jesus Hood MD   40 mg at 10/22/20 1037    furosemide (LASIX) tablet 40 mg  40 mg Oral BID Jesus Hood MD   40 mg at 10/22/20 1824    levothyroxine (SYNTHROID) tablet 75 mcg  75 mcg Oral Daily Jesus Hood MD   75 mcg at 10/22/20 1037    spironolactone (ALDACTONE) tablet 25 mg  25 mg Oral Daily Jesus Hood MD   25 mg at 10/22/20 1037    sodium chloride flush 0.9 % injection 10 mL  10 mL Intravenous 2 times per day Jesus Hood MD   10 mL at 10/22/20 1038    sodium chloride flush 0.9 % injection 10 mL  10 mL Intravenous PRN Jesus Hood MD        acetaminophen (TYLENOL) tablet 650 mg  650 mg Oral Q6H PRN Jesus Hood MD        Or    acetaminophen (TYLENOL) suppository 650 mg  650 mg Rectal Q6H PRN Jesus Hood MD        polyethylene glycol (GLYCOLAX) packet 17 g  17 g Oral Daily PRN Jesus Hood MD        promethazine (PHENERGAN) tablet 12.5 mg  12.5 mg Oral Q6H PRN Jesus Hood MD        Or    ondansetron (ZOFRAN) injection 4 mg  4 mg Intravenous Q6H PRN Jesus Hood MD        potassium chloride (KLOR-CON M) extended release tablet 40 mEq  40 mEq Oral PRN Jesus Hood MD        Or    potassium bicarb-citric acid (EFFER-K) effervescent tablet 40 mEq  40 mEq Oral PRN Jesus Hood MD        Or    potassium chloride 10 mEq/100 mL IVPB (Peripheral Line)  10 mEq Intravenous PRN Jesus Hood MD        magnesium sulfate 2 g in dextrose 5 % 100 mL IVPB  2 g Intravenous PRN Jesus Hood MD        traZODone (DESYREL) tablet 50 mg  50 mg Oral Nightly PRN Denita periventricular and subcortical white matter, which are nonspecific, but may represent chronic small vessel ischemic change. Evidence of remote lacunar infarcts in the cerebellar hemispheres and basal ganglia. ORBITS: The visualized portion of the orbits demonstrate no acute abnormality. SINUSES: The visualized paranasal sinuses and mastoid air cells demonstrate no acute abnormality. SOFT TISSUES/SKULL:  No acute abnormality of the visualized skull or soft tissues. No acute intracranial abnormality. Ct Cervical Spine Wo Contrast    Result Date: 10/22/2020  EXAMINATION: CT OF THE CERVICAL SPINE WITHOUT CONTRAST 10/22/2020 7:07 am TECHNIQUE: CT of the cervical spine was performed without the administration of intravenous contrast. Multiplanar reformatted images are provided for review. Dose modulation, iterative reconstruction, and/or weight based adjustment of the mA/kV was utilized to reduce the radiation dose to as low as reasonably achievable. COMPARISON: None. HISTORY: ORDERING SYSTEM PROVIDED HISTORY: syncope TECHNOLOGIST PROVIDED HISTORY: syncope Reason for Exam: Fall, syncope, patient denies head trauma Acuity: Acute FINDINGS: BONES/ALIGNMENT: There is no acute fracture or traumatic malalignment. Prominent vascular impression on the occipital bone. DEGENERATIVE CHANGES: Advanced multilevel degenerative disc disease and facet arthropathy is present. SOFT TISSUES: There is no prevertebral soft tissue swelling. No acute abnormality of the cervical spine. EKG:  NSR, PVC, left bundle branch block, Wide QRS rhythm, ventricular rate was 64.     Assessment :      Primary Problem  Syncope    Active Hospital Problems    Diagnosis Date Noted    Syncope [R55] 10/22/2020    Syncope and collapse [R55] 10/22/2020    Hypokalemia [E87.6] 02/43/1111    Systolic heart failure (HCC) [I50.20] 04/27/2020    Atrial fibrillation, currently in sinus rhythm [Z86.79] 04/27/2020    Symptomatic bradycardia [R00.1]  LBBB (left bundle branch block) [I44.7] 04/16/2020    Hypertension [I10] 04/16/2020    H/O TIA (transient ischemic attack) and stroke [Z86.73] 04/16/2020    Hypothyroid [E03.9] 04/16/2020    A-fib ([facility] [address] [I48.91] 04/15/2020       Plan:     Patient status Admit as inpatient in the  Med/Surge    Consultations:   IP CONSULT TO INTERNAL MEDICINE  IP CONSULT TO SOCIAL WORK  IP CONSULT TO CARDIOLOGY    Symptomatic bradycardia-possibly related to his medications:  -Consult cardiology. -Metoprolol 100 mg patient home medication was stopped. -Metoprolol 25 mg daily   -CXR negative for pathologies.   -Troponin 33   -Pro-BNP 2302    History of chronic atrial fibrillation, on anticoagulation- (stable)  -On apixaban 5 mg BID  -On ASA 81 mg daily  -Amiodarone was discontinued on admission.  -Recent cardioversion  -Recent medical adjustments  -Mg level = 2     R/O head/neck trauma with patient's early morning event prior to admission:  -CT C spine = No acute abnormality of C spine  -CT brain without contrast = negative for acute pathologies. Near syncope? -Orthostatic blood pressure readings     Systolic heart failure with EF 25%   -On lasix 40 mg daily  -On spironolactone 25 mg daily  -Limit aggressive IVF    Hyperlipidemia, continue medical management during hospitalization:  -Atorvastatin 80 mg daily.  -Lisinopril 2.5 mg daily.     Hypothyroidism:  -On synthroid 75 mcg daily.  -Check TSH, 6.26  -T4 1.38    Hypokalemia (3.5)  replacement with 20 meq KCL orally  -Follow K level  -On spironolactone 25 mg daily    Chronic hypertension, will continue his medical management:   -On Lasix   -On lisinopril    Urine analysis negative, LFT normal, lipase normal   Cr reading on admission was 1.22- baseline pattern over the last few months show an elevated Cr in the range of 1.2-1.45.; Possibly related to lisinopril     Patient is admitted as inpatient status because of co-morbiditieslisted above, severity of signs and symptoms as outlined, requirement for current medical therapies and most importantly because of direct risk to patient if care not provided in a hospital setting.       Joseph Mcguire MD  10/22/2020  6:36 PM    Copy sent to Dr. Cornel Schwartz MD

## 2020-10-22 NOTE — PROGRESS NOTES
ADDENDUM:        Care of this patient was assumed from Dr. Sonal Benitez    at  0700    . The patient was seen for Fatigue and Emesis  . The patient's initial evaluation and plan have been discussed with the prior provider who initially evaluated the patient. Nursing Notes, Past Medical Hx, Past Surgical Hx, Social Hx, Allergies, and Family Hx were all reviewed. I performed a repeat evaluation of the patient and reviewed tests completed so far. ED Course      82-year-old male history of paroxysmal A. fib status post cardioversion about a month ago on Eliquis, heart failure with reduced ejection fraction most recently 25% in April of this year presented with what sounds like a high risk episode of syncope. Signed out to me with labs and imaging pending. Labs showed stable CKD. Mild hypokalemia at 3.3. EKG with sinus bradycardia and first-degree AV block. Positive troponin. Will admit for higher syncope. Spoke with cardiology and primary care. The patient was given the following medications:  No orders of the defined types were placed in this encounter. RECENT VITALS:  BP: (!) 158/80, Temp: 98.1 °F (36.7 °C), Pulse: 52, Resp: 18     RADIOLOGY:All plain film, CT, MRI, and formal ultrasound images (except ED bedside ultrasound) are read by the radiologist and the images and interpretations are directly viewed by the emergency physician. XR CHEST (2 VW)   Final Result   No acute pulmonary process. CT HEAD WO CONTRAST    (Results Pending)   CT CERVICAL SPINE WO CONTRAST    (Results Pending)         LABS: All lab results were reviewed by myself, and all abnormals are listed below.   Labs Reviewed   CBC WITH AUTO DIFFERENTIAL - Abnormal; Notable for the following components:       Result Value    WBC 11.1 (*)     RBC 4.21 (*)     Hemoglobin 13.3 (*)     Hematocrit 38.4 (*)     Seg Neutrophils 89 (*)     Lymphocytes 6 (*)     Segs Absolute 9.80 (*)     Absolute Lymph # 0.70 (*)     All other components within normal limits   BASIC METABOLIC PANEL - Abnormal; Notable for the following components:    Glucose 119 (*)     CREATININE 1.22 (*)     Potassium 3.3 (*)     GFR Non- 57 (*)     All other components within normal limits   BRAIN NATRIURETIC PEPTIDE - Abnormal; Notable for the following components:    Pro-BNP 2,302 (*)     All other components within normal limits   TROPONIN - Abnormal; Notable for the following components:    Troponin, High Sensitivity 36 (*)     All other components within normal limits   POC GLUCOSE FINGERSTICK - Abnormal; Notable for the following components:    POC Glucose 118 (*)     All other components within normal limits   URINALYSIS   MAGNESIUM   LIPASE   HEPATIC FUNCTION PANEL   CK   TROPONIN           Disposition   DISPOSITION:    DISPOSITION        CLINICAL IMPRESSION:  1. Syncope and collapse        PATIENT REFERRED TO:  No follow-up provider specified.     DISCHARGE MEDICATIONS:  New Prescriptions    No medications on file       Rober Alvarez MD  Attending Emergency Physician

## 2020-10-22 NOTE — ED NOTES
Writer assumed care of pt at this time; Leonor Clemons agrees with previous RNs note; pt denies needs; pt is on cardiac monitor; requesting to sleep. Will continue to monitor.      Lola Chinchilla RN  10/22/20 [id number]

## 2020-10-22 NOTE — FLOWSHEET NOTE
10/22/20 1149   Encounter Summary   Services provided to: Patient not available  (sleeping)   Spiritual/Advent   Type Spiritual support   Assessment Sleeping   Intervention Prayer   Outcome Did not respond

## 2020-10-22 NOTE — PROGRESS NOTES
Pt arrived to floor via stretcher from ED and was transferred to bed. Vitals taken. Admission and assessment complete. No distress noted. See doc flowsheet and admission navigator for details. POC and education initiated and reviewed with patient. Call light within reach, and pt educated on its use. Bed in lowest position, and locked. Side rails up x 2. Denied further questions or needs at this time. Will continue to monitor.

## 2020-10-22 NOTE — ED NOTES
Patient ambulates to restroom with steady gait with this RN.       Jacqueline Perez RN  10/22/20 0193

## 2020-10-23 LAB
ANION GAP SERPL CALCULATED.3IONS-SCNC: 8 MMOL/L (ref 9–17)
BUN BLDV-MCNC: 12 MG/DL (ref 8–23)
BUN/CREAT BLD: ABNORMAL (ref 9–20)
CALCIUM SERPL-MCNC: 8.9 MG/DL (ref 8.6–10.4)
CHLORIDE BLD-SCNC: 104 MMOL/L (ref 98–107)
CO2: 32 MMOL/L (ref 20–31)
CREAT SERPL-MCNC: 1.31 MG/DL (ref 0.7–1.2)
GFR AFRICAN AMERICAN: >60 ML/MIN
GFR NON-AFRICAN AMERICAN: 52 ML/MIN
GFR SERPL CREATININE-BSD FRML MDRD: ABNORMAL ML/MIN/{1.73_M2}
GFR SERPL CREATININE-BSD FRML MDRD: ABNORMAL ML/MIN/{1.73_M2}
GLUCOSE BLD-MCNC: 96 MG/DL (ref 70–99)
HCT VFR BLD CALC: 33.6 % (ref 41–53)
HEMOGLOBIN: 12 G/DL (ref 13.5–17.5)
MAGNESIUM: 2 MG/DL (ref 1.6–2.6)
MCH RBC QN AUTO: 32.6 PG (ref 26–34)
MCHC RBC AUTO-ENTMCNC: 35.8 G/DL (ref 31–37)
MCV RBC AUTO: 91 FL (ref 80–100)
NRBC AUTOMATED: ABNORMAL PER 100 WBC
PDW BLD-RTO: 15 % (ref 11.5–14.9)
PLATELET # BLD: 159 K/UL (ref 150–450)
PMV BLD AUTO: 7.2 FL (ref 6–12)
POTASSIUM SERPL-SCNC: 3.6 MMOL/L (ref 3.7–5.3)
RBC # BLD: 3.69 M/UL (ref 4.5–5.9)
SODIUM BLD-SCNC: 144 MMOL/L (ref 135–144)
WBC # BLD: 6.2 K/UL (ref 3.5–11)

## 2020-10-23 PROCEDURE — 6370000000 HC RX 637 (ALT 250 FOR IP): Performed by: STUDENT IN AN ORGANIZED HEALTH CARE EDUCATION/TRAINING PROGRAM

## 2020-10-23 PROCEDURE — 99239 HOSP IP/OBS DSCHRG MGMT >30: CPT | Performed by: INTERNAL MEDICINE

## 2020-10-23 PROCEDURE — 2060000000 HC ICU INTERMEDIATE R&B

## 2020-10-23 PROCEDURE — 6360000002 HC RX W HCPCS: Performed by: INTERNAL MEDICINE

## 2020-10-23 PROCEDURE — 36415 COLL VENOUS BLD VENIPUNCTURE: CPT

## 2020-10-23 PROCEDURE — 97162 PT EVAL MOD COMPLEX 30 MIN: CPT

## 2020-10-23 PROCEDURE — 2580000003 HC RX 258: Performed by: STUDENT IN AN ORGANIZED HEALTH CARE EDUCATION/TRAINING PROGRAM

## 2020-10-23 PROCEDURE — 80048 BASIC METABOLIC PNL TOTAL CA: CPT

## 2020-10-23 PROCEDURE — 85027 COMPLETE CBC AUTOMATED: CPT

## 2020-10-23 PROCEDURE — 6370000000 HC RX 637 (ALT 250 FOR IP): Performed by: INTERNAL MEDICINE

## 2020-10-23 PROCEDURE — 97110 THERAPEUTIC EXERCISES: CPT

## 2020-10-23 PROCEDURE — 83735 ASSAY OF MAGNESIUM: CPT

## 2020-10-23 PROCEDURE — 93308 TTE F-UP OR LMTD: CPT

## 2020-10-23 RX ORDER — METOPROLOL SUCCINATE 25 MG/1
25 TABLET, EXTENDED RELEASE ORAL DAILY
Qty: 30 TABLET | Refills: 3 | Status: SHIPPED | OUTPATIENT
Start: 2020-10-24 | End: 2022-01-24

## 2020-10-23 RX ORDER — FLUOXETINE HYDROCHLORIDE 20 MG/1
20 CAPSULE ORAL DAILY
Qty: 30 CAPSULE | Refills: 3 | Status: SHIPPED | OUTPATIENT
Start: 2020-10-24 | End: 2022-01-24

## 2020-10-23 RX ORDER — FUROSEMIDE 20 MG/1
20 TABLET ORAL DAILY
Status: DISCONTINUED | OUTPATIENT
Start: 2020-10-24 | End: 2020-10-24 | Stop reason: HOSPADM

## 2020-10-23 RX ORDER — FLUOXETINE HYDROCHLORIDE 20 MG/1
20 CAPSULE ORAL DAILY
Status: DISCONTINUED | OUTPATIENT
Start: 2020-10-24 | End: 2020-10-24 | Stop reason: HOSPADM

## 2020-10-23 RX ORDER — POTASSIUM CHLORIDE 20 MEQ/1
40 TABLET, EXTENDED RELEASE ORAL ONCE
Status: COMPLETED | OUTPATIENT
Start: 2020-10-23 | End: 2020-10-23

## 2020-10-23 RX ORDER — FUROSEMIDE 20 MG/1
20 TABLET ORAL DAILY
Qty: 60 TABLET | Refills: 3 | Status: SHIPPED | OUTPATIENT
Start: 2020-10-24

## 2020-10-23 RX ADMIN — Medication 10 ML: at 10:25

## 2020-10-23 RX ADMIN — METOPROLOL SUCCINATE 25 MG: 25 TABLET, EXTENDED RELEASE ORAL at 10:20

## 2020-10-23 RX ADMIN — ATORVASTATIN CALCIUM 80 MG: 80 TABLET, FILM COATED ORAL at 10:21

## 2020-10-23 RX ADMIN — FUROSEMIDE 40 MG: 40 TABLET ORAL at 10:21

## 2020-10-23 RX ADMIN — POTASSIUM CHLORIDE 20 MEQ: 1500 TABLET, EXTENDED RELEASE ORAL at 10:20

## 2020-10-23 RX ADMIN — BUPROPION HYDROCHLORIDE 150 MG: 150 TABLET, EXTENDED RELEASE ORAL at 20:25

## 2020-10-23 RX ADMIN — HYDRALAZINE HYDROCHLORIDE 10 MG: 20 INJECTION INTRAMUSCULAR; INTRAVENOUS at 20:57

## 2020-10-23 RX ADMIN — SPIRONOLACTONE 25 MG: 25 TABLET, FILM COATED ORAL at 10:21

## 2020-10-23 RX ADMIN — LISINOPRIL 2.5 MG: 5 TABLET ORAL at 10:21

## 2020-10-23 RX ADMIN — FLUOXETINE HYDROCHLORIDE 40 MG: 20 CAPSULE ORAL at 10:19

## 2020-10-23 RX ADMIN — BUPROPION HYDROCHLORIDE 150 MG: 150 TABLET, EXTENDED RELEASE ORAL at 10:20

## 2020-10-23 RX ADMIN — APIXABAN 5 MG: 5 TABLET, FILM COATED ORAL at 20:25

## 2020-10-23 RX ADMIN — POTASSIUM CHLORIDE 40 MEQ: 1500 TABLET, EXTENDED RELEASE ORAL at 16:20

## 2020-10-23 RX ADMIN — APIXABAN 5 MG: 5 TABLET, FILM COATED ORAL at 10:20

## 2020-10-23 RX ADMIN — Medication 10 ML: at 20:26

## 2020-10-23 RX ADMIN — LEVOTHYROXINE SODIUM 75 MCG: 75 TABLET ORAL at 10:20

## 2020-10-23 ASSESSMENT — PAIN SCALES - GENERAL: PAINLEVEL_OUTOF10: 0

## 2020-10-23 NOTE — DISCHARGE INSTR - COC
Continuity of Care Form    Patient Name: Imelda Cervantes   :  1935  MRN:  [id number]    Admit date:  10/22/2020  Discharge date:  10/23/2020    Code Status Order: Full Code   Advance Directives:   [facility] Documentation       Date/Time Healthcare Directive Type of Healthcare Directive Copy in [address] [facility] [address] Agent's Name Healthcare Agent's Phone Number    10/22/20 1024  No, patient does not have an advance directive for healthcare treatment -- -- -- -- --            Admitting Physician:  Vika Reynoso MD  PCP: Zulma Travis MD    Discharging [facility] Unit/Room#: 2092/2092-01  Discharging Unit Phone Number: [phone]    Emergency Contact:   Extended Emergency Contact Information  Primary Emergency Contact: Alex Yang  Address: [address] [place] [address], [address] [place] [address]  Home Phone: [phone]  Relation: Spouse  Secondary [facility] [address] Phone: [phone]  Relation: Child   needed? No    Past Surgical History:  Past Surgical History:   Procedure Laterality Date    CARDIOVERSION N/A 2020    CARDIOVERSION performed by Rhonda Mckay MD at [facility] OR       Immunization History: There is no immunization history on file for this patient.     Active Problems:  Patient Active Problem List   Diagnosis Code    A-fib ([facility] [address] I48.91    LBBB (left bundle branch block) I44.7    Hypertension I10    H/O TIA (transient ischemic attack) and stroke Z86.73    NSTEMI (non-ST elevated myocardial infarction) ([facility] [address] I21.4    Hypothyroid E03.9    Mild malnutrition (HCC) E44.1    Near syncope U03    Systolic heart failure (HCC) I50.20    Atrial fibrillation, currently in sinus rhythm Z86.79    Symptomatic bradycardia R00.1    Syncope R55    Anxiety F41.9    Diverticulosis of large intestine K57.30    History of Mohs micrographic surgery for skin cancer Z85.828, Z98.890    Hyperlipidemia E78.5    Mild pulmonary hypertension (HCC) I27.20    Mild mitral regurgitation I34.0    Mild tricuspid regurgitation I07.1    Pseudophakia of both eyes Z96.1    Spinal stenosis of lumbar region M48.061    Syncope and collapse R55    Hypokalemia E87.6       Isolation/Infection:   Isolation            No Isolation          Patient Infection Status       Infection Onset Added Last Indicated Last Indicated By Review Planned Expiration Resolved Resolved By    None active    Resolved    COVID-19 Rule Out 09/24/20 09/24/20 09/24/20 COVID-19 (Ordered)   09/24/20 Rule-Out Test Resulted    COVID-19 Rule Out 04/15/20 04/15/20 04/15/20 COVID-19 (Ordered)   04/16/20 Rule-Out Test Resulted            Nurse Assessment:  Last Vital Signs: BP (!) 138/51   Pulse 64   Temp 97.6 °F (36.4 °C) (Oral)   Resp 16   Ht 6' 6\" (1.981 m)   Wt 207 lb 14.3 oz (94.3 kg)   SpO2 98%   BMI 24.02 kg/m²     Last documented pain score (0-10 scale): Pain Level: 0  Last Weight:   Wt Readings from Last 1 Encounters:   10/23/20 207 lb 14.3 oz (94.3 kg)     Mental Status:  oriented and alert    IV Access:  - None    Nursing Mobility/ADLs:  Walking   Independent  Transfer  Independent  Bathing  Independent  Dressing  Independent  Toileting  Independent  Feeding  [facility] Delivery   whole    Wound Care Documentation and Therapy:        Elimination:  Continence:   · Bowel: Yes  · Bladder: Yes  Urinary Catheter: None   Colostomy/Ileostomy/Ileal Conduit: No       Date of Last BM: N/A    Intake/Output Summary (Last 24 hours) at 10/23/2020 1124  Last data filed at 10/23/2020 1058  Gross per 24 hour   Intake 880 ml   Output 800 ml   Net 80 ml     I/O last 3 completed shifts: In: 0 [P.O.:880]  Out: 400 [Urine:400]    Safety Concerns:      At Risk for Falls    Impairments/Disabilities:      Vision    Nutrition Therapy:  Current Nutrition Therapy:   - Oral Diet:  Cardiac    Routes of Feeding: Oral  Liquids: No Restrictions  Daily Fluid Restriction: no  Last Modified Barium Swallow with Video (Video Swallowing Test): not done    Treatments at the Time of Hospital Discharge:   Respiratory Treatments: N/A  Oxygen Therapy:  is not on home oxygen therapy. Ventilator:    - No ventilator support    Rehab Therapies: Physical Therapy and Occupational Therapy  Weight Bearing Status/Restrictions: No weight bearing restirctions  Other Medical Equipment (for information only, NOT a DME order):  New Jobst Stockings from [facility]  Other Treatments: Skilled Nursing Assessment, Medication Education and Monitoring  Palliative Consult/Conversation      Patient's personal belongings (please select all that are sent with patient):  None    RN SIGNATURE:  Electronically signed by Antonia Beaver RN on 10/23/20 at 2:16 PM EDT    CASE MANAGEMENT/SOCIAL WORK SECTION    Inpatient Status Date: 10/22/2020    Readmission Risk Assessment Score:  Readmission Risk              Risk of Unplanned Readmission:        22           Discharging to [facility]  P: [phone]  F: [phone]      / signature: Electronically signed by Antonia Beaver RN on 10/23/20 at 2:16 PM EDT    PHYSICIAN SECTION    Prognosis: Good    Condition at Discharge: Stable    Rehab Potential (if transferring to Rehab): Fair    Recommended Labs or Other Treatments After Discharge: N/A    Physician Certification: I certify the above information and transfer of Eula Del Angel  is necessary for the continuing treatment of the diagnosis listed and that he requires Home Care for greater 30 days.      Update Admission H&P: No change in H&P    PHYSICIAN SIGNATURE:  Electronically signed by Efrain Le MD on 10/23/20 at 11:24 AM EDT

## 2020-10-23 NOTE — CARE COORDINATION
DISCHARGE PLANNING NOTE:    Prescription for bilateral thigh high jobst stockings 20-33 mg Hg was faxed to [facility] at [phone]. It was requested that they deliver the stockings to patients bedside today.      Electronically signed by Isis Downey RN on 10/23/2020 at 11:57 AM

## 2020-10-23 NOTE — PLAN OF CARE
Problem: Falls - Risk of:  Goal: Will remain free from falls  Description: Will remain free from falls  10/23/2020 1622 by Aide Ghotra RN  Outcome: Met This Shift  Note: Patient remianed free of falls throughout the shift, bed low and locked, bed alarm on   10/23/2020 0431 by Kurt Lynch RN  Outcome: Ongoing  Note: Pt assessed as a fall risk this shift. Remains free from falls and accidental injury at this time. Fall precautions in place, including falling star sign and fall risk band on pt. Floor free from obstacles, and bed is locked and in lowest position. Adequate lighting provided. Pt encouraged to call before getting OOB for any need. Bed alarm activated. Will continue to monitor needs during hourly rounding, and reinforce education on use of call light.        Problem: Cardiac:  Goal: Ability to maintain vital signs within normal range will improve  Description: Ability to maintain vital signs within normal range will improve  10/23/2020 1622 by Aide Ghotra RN  Outcome: Ongoing  10/23/2020 0431 by Kurt Lynch RN  Outcome: Ongoing  Note:   Vitals:    10/22/20 1352 10/22/20 2015 10/23/20 0215 10/23/20 0349   BP: (!) 125/59 (!) 153/72 (!) 141/74    Pulse: 65 55 65    Resp: 14 16 14    Temp: 97.9 °F (36.6 °C) 97.7 °F (36.5 °C) 98.1 °F (36.7 °C)    TempSrc: Oral Oral Oral    SpO2: 98% 94% 97%    Weight:    207 lb 14.3 oz (94.3 kg)   Height:          Goal: Cardiovascular alteration will improve  Description: Cardiovascular alteration will improve  Outcome: Ongoing     Problem: Health Behavior:  Goal: Will modify at least one risk factor affecting health status  Description: Will modify at least one risk factor affecting health status  Outcome: Ongoing     Problem: Physical Regulation:  Goal: Complications related to the disease process, condition or treatment will be avoided or minimized  Description: Complications related to the disease process, condition or treatment will be avoided or

## 2020-10-23 NOTE — CONSULTS
Cardiology Consult           Date of Admission:  10/22/2020  Date of Consultation:  10/23/2020      PCP:  Anthony Donaldson MD      Reason for consult: Syncope    History of Present Illness:  Eula Del Angel is a 82 y.o. male who presents with fatigue and emesis. History includes paroxysmal atrial fibrillation with recent DCCV last month on Eliquis, pathic nonischemic cardiomyopathy with chronic systolic heart failure, hypertension, left bundle branch block, and reported CVA. Patient reports yesterday he was sitting on the toilet and next thing he knew he was lying on the ground. He denies losing consciousness but does appear there was a syncopal episode. He says all he remembers prior to the episode is feeling weak while sitting on the toilet. Denies any associated chest pain, palpitations, or shortness of breath. Additionally, patient reports since having his cardioversion last month he has felt more tired/fatigued along with occasionally having episodes of dizziness. This is his first reported episode of syncope although. Currently resting in bed. Denies any active complaints from a cardiac standpoint at this time. EKG shows sinus bradycardia with underlying left bundle branch block. No significant ischemic changes. High-sensitivity troponin 36 initially, most recently 33. Creatinine stable compared to baseline. proBNP is elevated but chest x-ray is stable and patient is not appear acutely fluid overloaded. Additionally patient reports he has not been taking his amiodarone at home daily since being discharged last month. PMH:   has a past medical history of A-fib [facility]), CVA (cerebral vascular accident) ([facility] [address] and Myocardial infarct, old. PSH:   has a past surgical history that includes Cardioversion (N/A, 9/24/2020). Allergies:  No Known Allergies     Home Meds:    Prior to Admission medications    Medication Sig Start Date End Date Taking?  Authorizing Provider amiodarone (PACERONE) 400 MG tablet Take 1 tablet by mouth daily 9/25/20  Yes Brien Song MD   metoprolol succinate (TOPROL XL) 100 MG extended release tablet Take 1 tablet by mouth 2 times daily 9/25/20  Yes Brien Song MD   lisinopril (PRINIVIL;ZESTRIL) 2.5 MG tablet Take 1 tablet by mouth daily 9/26/20  Yes Brien Song MD   apixaban (ELIQUIS) 5 MG TABS tablet Take 1 tablet by mouth 2 times daily 4/20/20  Yes Mitchell Gusman MD   furosemide (LASIX) 40 MG tablet Take 1 tablet by mouth 2 times daily 4/20/20  Yes Mitchell Gusman MD   spironolactone (ALDACTONE) 25 MG tablet Take 1 tablet by mouth daily 4/21/20  Yes Mitchell Gusman MD   aspirin 81 MG chewable tablet Take 1 tablet by mouth daily 4/21/20  Yes Mitchell Gusman MD   FLUoxetine (PROZAC) 20 MG capsule Take 40 mg by mouth daily  3/24/20  Yes Historical Provider, MD   atorvastatin (LIPITOR) 80 MG tablet Take 80 mg by mouth daily 1/1/20  Yes Historical Provider, MD   [facility]) 150 MG extended release tablet Take 150 mg by mouth 2 times daily 2/16/20  Yes Historical Provider, MD   levothyroxine (SYNTHROID) 75 MCG tablet Take 75 mcg by mouth daily 8/23/19  Yes Historical Provider, MD   traZODone (DESYREL) 50 MG tablet Take 25-75 mg by mouth nightly as needed for Sleep 3/24/20  Yes Historical Provider, MD        [facility] Meds:    Current Facility-Administered Medications   Medication Dose Route Frequency Provider Last Rate Last Dose    apixaban (ELIQUIS) tablet 5 mg  5 mg Oral BID Giselle Mckeon MD   5 mg at 10/22/20 2119    atorvastatin (LIPITOR) tablet 80 mg  80 mg Oral Daily Denita Hart MD   80 mg at 10/22/20 1037    [facility]) extended release tablet 150 mg  150 mg Oral BID Giselle Mckeon MD   150 mg at 10/22/20 2119    FLUoxetine (PROZAC) capsule 40 mg  40 mg Oral Daily Giselle Mckeon MD   40 mg at 10/22/20 1037    furosemide (LASIX) tablet 40 mg  40 mg Oral BID Giselle Mckeon MD   40 mg at 10/22/20 1824    levothyroxine (SYNTHROID) tablet 75 mcg  75 mcg Oral Daily Thony Weaver MD   75 mcg at 10/22/20 1037    spironolactone (ALDACTONE) tablet 25 mg  25 mg Oral Daily Thony Weaver MD   25 mg at 10/22/20 1037    sodium chloride flush 0.9 % injection 10 mL  10 mL Intravenous 2 times per day Thony Weaver MD   10 mL at 10/22/20 2126    sodium chloride flush 0.9 % injection 10 mL  10 mL Intravenous PRN Thony Weaver MD        acetaminophen (TYLENOL) tablet 650 mg  650 mg Oral Q6H PRN Thony Weaver MD        Or    acetaminophen (TYLENOL) suppository 650 mg  650 mg Rectal Q6H PRN Thony Weaver MD        polyethylene glycol (GLYCOLAX) packet 17 g  17 g Oral Daily PRN Thony Weaver MD        promethazine (PHENERGAN) tablet 12.5 mg  12.5 mg Oral Q6H PRN Thony Weaver MD        Or    ondansetron (ZOFRAN) injection 4 mg  4 mg Intravenous Q6H PRN Thony Weaver MD        potassium chloride (KLOR-CON M) extended release tablet 40 mEq  40 mEq Oral PRN Thony Weaver MD        Or    potassium bicarb-citric acid (EFFER-K) effervescent tablet 40 mEq  40 mEq Oral PRN Thony Weaver MD        Or    potassium chloride 10 mEq/100 mL IVPB (Peripheral Line)  10 mEq Intravenous PRN Thony Weaver MD        magnesium sulfate 2 g in dextrose 5 % 100 mL IVPB  2 g Intravenous PRN Thony Weaver MD        traZODone (DESYREL) tablet 50 mg  50 mg Oral Nightly PRN Thony Weaver MD        metoprolol succinate (TOPROL XL) extended release tablet 25 mg  25 mg Oral Daily Mortimer Rich Adusumilli, MD   25 mg at 10/22/20 1207    lisinopril (PRINIVIL;ZESTRIL) tablet 2.5 mg  2.5 mg Oral Lunch Bertin Lezama MD        hydrALAZINE (APRESOLINE) injection 10 mg  10 mg Intravenous Q6H PRN Mortimer Rich Adusumilli, MD        potassium chloride (KLOR-CON M) extended release tablet 20 mEq  20 mEq Oral Daily with breakfast Lizbet Colunga MD           Social History:       TOBACCO:   reports that he has never smoked. He has never used smokeless tobacco.  ETOH:   has no history on file for alcohol. DRUGS:  has no history on file for drug. OCCUPATION:          Family Histroy:     History reviewed. No pertinent family history. Review of Systems:   · Constitutional: there has been no unanticipated weight loss. There's been no change in energy level, sleep pattern, or activity level. · Eyes: No visual changes or diplopia. No scleral icterus. · ENT: No Headaches, hearing loss or vertigo. No mouth sores or sore throat. · Cardiovascular: Per HPI. · Respiratory: No cough or wheezing, no sputum production. No hematemesis. · Gastrointestinal: No abdominal pain, appetite loss, blood in stools. No change in bowel or bladder habits. · Genitourinary: No dysuria, trouble voiding, or hematuria. · Musculoskeletal:  No gait disturbance, weakness or joint complaints. · Integumentary: No rash or pruritis. · Neurological: Per HPI. · Psychiatric: No anxiety, or depression. · Endocrine: No temperature intolerance. No excessive thirst, fluid intake, or urination. No tremor. · Hematologic/Lymphatic: No abnormal bruising or bleeding, blood clots or swollen lymph nodes. · Allergic/Immunologic: No nasal congestion or hives. Physical Exam    Vital Signs: BP (!) 141/74   Pulse 65   Temp 98.1 °F (36.7 °C) (Oral)   Resp 14   Ht 6' 6\" (1.981 m)   Wt 207 lb 14.3 oz (94.3 kg)   SpO2 97%   BMI 24.02 kg/m²        Admission Weight: 204 lb (92.5 kg)     General appearance: Awake, Alert Cooperative    Head: Normocephalic, without obvious abnormality, atraumatic    Eyes: Conjunctivae/corneas clear. PERRL, EOM's intact.  Fundi benign    Neck: no adenopathy, no carotid bruit, no JVD, supple, symmetrical, trachea midline and thyroid: not enlarged, symmetric, no tenderness/mass/nodules    Lungs: clear to auscultation bilaterally    Heart: regular rate and rhythm, S1, S2 normal, no murmur, click, rub or gallop    Abdomen: Soft, non-tender. Bowel sounds normal. No masses,  no organomegaly    Extremities: extremities normal, atraumatic, no cyanosis or edema    Skin: Skin color, texture, turgor normal. No rashes or lesions    Neurologic: Grossly normal        MEDICAL DECISION MAKING/TESTING    Echo/Stress:   History: [Features of left bundle branch block, congestive heart failure and cardiomyopathy. Dyspnea and edema. ]    Exam/Technique:  The stress portion of the study was monitored by the cardiology department and the clinical and EKG findings are reported separately.  Pharmacologic stress testing was performed with Lexiscan 0.4 mg. Resting and stress SPECT myocardial perfusion imaging was acquired after intravenous injection of 31.2 millicuries and 82.5 millicuries of sestamibi respectively.  The post stress SPECT imaging was gated. Stress EKG is not available. .  Please note that in the setting of left bundle branch block, evaluation for ischemia may be compromised by artifact in the septum. Findings:  Overall the study is of average quality.  No attenuation correction software was utilized. Attenuation artifact is absent.      There is abnormal uptake within the left ventricle particularly seen in the region of the apex and inferior wall extending into the inferior lateral and inferior septal wall which is matched.  Gated SPECT imaging displays significant dyskinesis with particular involvement of the left lateral wall   and the inferior wall. No definitive asymmetrical septal defects are noted.  The left ventricular ejection fraction was calculated to be 42%. The left ventricle appears to demonstrate marked dilatation and end-diastolic volume of 910 mL. IMPRESSION:      1.  Abnormal myocardial perfusion study suggesting cardiomyopathy and likely previous infarction in the inferior inferolateral wall.     2.  Overall left ventricular systolic function is abnormal with dyskinetic motion noted predominantly in the inferior wall and inferior apical wall and lateral wall. 3.  The nuclear findings indicate the risk of myocardial ischemia to be low to moderate. [id number]    Finalized by Lani Hernandes MD on 8/13/2020 12:55 PM    CONCLUSIONS     Summary  Contrast was utilized on this technically difficult study. Left ventricle is normal in size. Mild left ventricular hypertrophy. Difficult assessment of left ventricular function due to irregular rhythm. LV systolic function appears severely reduced. Estimated LV EF 25%. Both atria are normal in size. Right ventricle was not well visualized. Mild mitral regurgitation. Mild tricuspid regurgitation. Mild pulmonary hypertension. Estimated right ventricular systolic pressure  is 00MTPI.     Signature  ----------------------------------------------------------------------------   Electronically signed by Felipa Solano(Sonographer) on 04/16/2020 11:28   AM  ----------------------------------------------------------------------------     ----------------------------------------------------------------------------   Electronically signed by Ivan Fontanez(Interpreting physician) on   04/16/2020 11:39 AM    EKG:        Labs:      CBC:   Recent Labs     10/22/20  0643 10/23/20  0510   WBC 11.1* 6.2   HGB 13.3* 12.0*   HCT 38.4* 33.6*   MCV 91.3 91.0    159     BMP:   Recent Labs     10/22/20  0643 10/22/20  1744 10/23/20  0510     --  144   K 3.3* 3.6* 3.6*     --  104   CO2 31  --  32*   BUN 14  --  12   CREATININE 1.22*  --  1.31*     PT/INR: No results for input(s): PROTIME, INR in the last 72 hours. APTT: No results for input(s): APTT in the last 72 hours. MAG:   Recent Labs     10/22/20  0643 10/23/20  0510   MG 2.0 2.0     D Dimer: No results for input(s): DDIMER in the last 72 hours. Troponin T   Recent Labs     10/22/20  0643 10/22/20  0830   TROPONINT NOT REPORTED NOT REPORTED     ProBNP Invalid input(s): PRO-BNP      Assessment:    1. Probable syncopal episode  2. Mild troponin elevation - not representative of ACS  3. Positive orthostatic vitals  4. Sinus bradycardia  5. Paroxysmal atrial fibrillation s/p DCCV 09/2020 on Eliquis at home  6. 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 Chronic left bundle branch block  8. Chronic renal insufficiency  9. Hyperlipidemia  10. Reported CVA -patient denies history of TIA or CVA  6. Abnormal nuclear stress test August 2020 -treated medically    Plan:    Medication adjustments have been made by Dr. Maria E Brink. Monitor patient's blood pressure and rate response. Check limited echocardiogram to reevaluate LVEF, heart valves. Continually check orthostatic vitals. Determine need for amiodarone based on patient's response to lower dose of beta-blocker as he has been in sinus bradycardia. He reports that he has not been taking this at home and knows that the medicine is waiting for him at the pharmacy. Encouraged peripheral ambulation. Adequate hydration but not overhydration given systolic heart failure. Discuss further with attending.

## 2020-10-23 NOTE — PLAN OF CARE
Problem: Falls - Risk of:  Goal: Will remain free from falls  Description: Will remain free from falls  10/23/2020 0431 by Neva Gandara RN  Outcome: Ongoing  Note: Pt assessed as a fall risk this shift. Remains free from falls and accidental injury at this time. Fall precautions in place, including falling star sign and fall risk band on pt. Floor free from obstacles, and bed is locked and in lowest position. Adequate lighting provided. Pt encouraged to call before getting OOB for any need. Bed alarm activated. Will continue to monitor needs during hourly rounding, and reinforce education on use of call light.        Problem: Cardiac:  Goal: Ability to maintain vital signs within normal range will improve  Description: Ability to maintain vital signs within normal range will improve  10/23/2020 0431 by Neva Gandara RN  Outcome: Ongoing  Note:   Vitals:    10/22/20 1352 10/22/20 2015 10/23/20 0215 10/23/20 0349   BP: (!) 125/59 (!) 153/72 (!) 141/74    Pulse: 65 55 65    Resp: 14 16 14    Temp: 97.9 °F (36.6 °C) 97.7 °F (36.5 °C) 98.1 °F (36.7 °C)    TempSrc: Oral Oral Oral    SpO2: 98% 94% 97%    Weight:    207 lb 14.3 oz (94.3 kg)   Height:

## 2020-10-23 NOTE — DISCHARGE SUMMARY
[address]    Discharge Summary     Patient ID: Saadia Mchugh  :  1935   MRN: [id number]     ACCOUNT:  [de-identified]   Patient's PCP: Aura Mukherjee MD  Admit Date: 10/22/2020   Discharge Date: 10/24/2020  Length of Stay: 1  Code Status:  Full Code  Admitting Physician: Debo Farris MD  Discharge Physician: Karla Haynes MD     Active Discharge Diagnoses:       Primary Problem  Symptomatic bradycardia      Hospital Problems  Active Hospital Problems    Diagnosis Date Noted    Syncope [R55] 10/22/2020    Syncope and collapse [R55] 10/22/2020    Hypokalemia [E87.6]     Systolic heart failure ([address] [I50.20] 2020    Atrial fibrillation, currently in sinus rhythm [Z86.79] 2020    Symptomatic bradycardia [R00.1]     LBBB (left bundle branch block) [I44.7] 2020    Hypertension [I10] 2020    H/O TIA (transient ischemic attack) and stroke [Z86.73] 2020    Hypothyroid [E03.9] 2020    A-fib ([address] [I48.91] 04/15/2020       Admission Condition:  fair     Discharged Condition: good    Hospital Stay:       Hospital Course:  Saadia Mchugh is a 82 y.o. male who was admitted for the management of   Symptomatic bradycardia , presented to ER with Fatigue and Emesis    Brief assessment: 82years old male patient with past medical history of atrial fibrillation on Eliquis, heart failure and a recent cardioversion about a month prior to this admission secondary to atrial fibrillation with RVR. Patient presented with an episode of what seems like near syncope and loss of postural tone without loss of consciousness or sustaining and trauma or falling down. CT scan to the brain and neck were negative on admission. He woke up to go to the bathroom at about 2 AM on the day of admission, then suddenly felt upper and lower limb weakness.  He was on multiple medications to control his blood pressure/afib in addition to multiple psych medications. Orthostatic blood pressure readings were positive and patient was found to have bradycardia in the ER. Cardiac Echo was done during this hospitalization and was negative for structural problems, EF was 40% (improving compared to the previous Echo he had on April 2020 with EF of 25%). His hospitalization course was unremarkable and the patient felt better the next day after adjusting his medications. His near syncope and event that occurred was presumed to be secondary to polypharmacy.     The following changes occurred to his medications:  - Amiodarone was already stopped on previous discharge   -Trazodone was discontinued  -Prozac's dose reduced to 20 mg   -Lasix decreased to 20 mg   -Metoprolol dose was decreased to 25 mg instead of 100 mg daily.  -Patient was maintained on his lisinopril 2.5 mg   -spironolactone was discontinued    Significant therapeutic interventions:     Significant Diagnostic Studies:   Labs / Micro:  CBC:   Lab Results   Component Value Date    WBC 6.2 10/23/2020    RBC 3.69 10/23/2020    HGB 12.0 10/23/2020    HCT 33.6 10/23/2020    MCV 91.0 10/23/2020    MCH 32.6 10/23/2020    MCHC 35.8 10/23/2020    RDW 15.0 10/23/2020     10/23/2020     BMP:    Lab Results   Component Value Date    GLUCOSE 96 10/23/2020     10/23/2020    K 3.6 10/23/2020     10/23/2020    CO2 32 10/23/2020    ANIONGAP 8 10/23/2020    BUN 12 10/23/2020    CREATININE 1.31 10/23/2020    BUNCRER NOT REPORTED 10/23/2020    CALCIUM 8.9 10/23/2020    LABGLOM 52 10/23/2020    GFRAA >60 10/23/2020    GFR      10/23/2020    GFR NOT REPORTED 10/23/2020     HFP:    Lab Results   Component Value Date    PROT 7.0 10/22/2020     CMP:    Lab Results   Component Value Date    GLUCOSE 96 10/23/2020     10/23/2020    K 3.6 10/23/2020     10/23/2020    CO2 32 10/23/2020    BUN 12 10/23/2020    CREATININE 1.31 10/23/2020    ANIONGAP 8 10/23/2020    ALKPHOS 91 10/22/2020 ALT 23 10/22/2020    AST 28 10/22/2020    BILITOT 0.90 10/22/2020    LABALBU 3.7 10/22/2020    ALBUMIN NOT REPORTED 10/22/2020    LABGLOM 52 10/23/2020    GFRAA >60 10/23/2020    GFR      10/23/2020    GFR NOT REPORTED 10/23/2020    PROT 7.0 10/22/2020    CALCIUM 8.9 10/23/2020     PT/INR:    Lab Results   Component Value Date    PROTIME 14.6 04/15/2020    INR 1.1 04/15/2020     PTT:   Lab Results   Component Value Date    APTT 30.5 04/15/2020     FLP:  No results found for: CHOL, TRIG, HDL  U/A:    Lab Results   Component Value Date    COLORU YELLOW 10/22/2020    TURBIDITY CLEAR 10/22/2020    SPECGRAV 1.013 10/22/2020    HGBUR NEGATIVE 10/22/2020    PHUR 7.0 10/22/2020    PROTEINU NEGATIVE 10/22/2020    GLUCOSEU NEGATIVE 10/22/2020    KETUA NEGATIVE 10/22/2020    BILIRUBINUR NEGATIVE 10/22/2020    UROBILINOGEN Normal 10/22/2020    NITRU NEGATIVE 10/22/2020    LEUKOCYTESUR NEGATIVE 10/22/2020     TSH:    Lab Results   Component Value Date    TSH 6.26 10/22/2020          Radiology:    Xr Chest (2 Vw)    Result Date: 10/22/2020  EXAMINATION: TWO XRAY VIEWS OF THE CHEST 10/22/2020 6:46 am COMPARISON: September 19, 2020 HISTORY: ORDERING SYSTEM PROVIDED HISTORY: syncope TECHNOLOGIST PROVIDED HISTORY: syncope Reason for Exam: weakness, slid to bathroom floor and unable to get self up today Acuity: Unknown Type of Exam: Unknown FINDINGS: No focal consolidation. Borderline cardiomegaly. No pulmonary edema. Mild atherosclerotic calcification of the thoracic aorta. Mild thoracic spine degenerative changes. No acute pulmonary process. Ct Head Wo Contrast    Result Date: 10/22/2020  EXAMINATION: CT OF THE HEAD WITHOUT CONTRAST  10/22/2020 7:05 am TECHNIQUE: CT of the head was performed without the administration of intravenous contrast. Dose modulation, iterative reconstruction, and/or weight based adjustment of the mA/kV was utilized to reduce the radiation dose to as low as reasonably achievable.  COMPARISON: None. HISTORY: ORDERING SYSTEM PROVIDED HISTORY: syncope TECHNOLOGIST PROVIDED HISTORY: syncope Reason for Exam: Fall, syncope, patient denies head trauma Acuity: Acute FINDINGS: BRAIN/VENTRICLES: There is no acute intracranial hemorrhage, mass effect or midline shift. No abnormal extra-axial fluid collection. The gray-white differentiation is maintained without evidence of an acute infarct. There is no evidence of hydrocephalus. Patchy hypodensities in the periventricular and subcortical white matter, which are nonspecific, but may represent chronic small vessel ischemic change. Evidence of remote lacunar infarcts in the cerebellar hemispheres and basal ganglia. ORBITS: The visualized portion of the orbits demonstrate no acute abnormality. SINUSES: The visualized paranasal sinuses and mastoid air cells demonstrate no acute abnormality. SOFT TISSUES/SKULL:  No acute abnormality of the visualized skull or soft tissues. No acute intracranial abnormality. Ct Cervical Spine Wo Contrast    Result Date: 10/22/2020  EXAMINATION: CT OF THE CERVICAL SPINE WITHOUT CONTRAST 10/22/2020 7:07 am TECHNIQUE: CT of the cervical spine was performed without the administration of intravenous contrast. Multiplanar reformatted images are provided for review. Dose modulation, iterative reconstruction, and/or weight based adjustment of the mA/kV was utilized to reduce the radiation dose to as low as reasonably achievable. COMPARISON: None. HISTORY: ORDERING SYSTEM PROVIDED HISTORY: syncope TECHNOLOGIST PROVIDED HISTORY: syncope Reason for Exam: Fall, syncope, patient denies head trauma Acuity: Acute FINDINGS: BONES/ALIGNMENT: There is no acute fracture or traumatic malalignment. Prominent vascular impression on the occipital bone. DEGENERATIVE CHANGES: Advanced multilevel degenerative disc disease and facet arthropathy is present. SOFT TISSUES: There is no prevertebral soft tissue swelling.      No acute abnormality of the cervical spine. Consultations:    Consults:     Final Specialist Recommendations/Findings:   IP CONSULT TO INTERNAL MEDICINE  IP CONSULT TO SOCIAL WORK  IP CONSULT TO CARDIOLOGY      The patient was seen and examined on day of discharge and this discharge summary is in conjunction with any daily progress note from day of discharge. Discharge plan:       Disposition: Home    Physician Follow Up:   [facility]  [address] [place] [address] [phone] [address] [facility] - They will call you to schedule a good time to visit    Taisha Ragsdale MD  [facility]. [phone]  [place] [phone]    Schedule an appointment as soon as possible for a visit  As needed    Christophe Whitt MD  [address] [place] [address]  [phone]    Go on 11/2/2020  Go to your already scheduled appointment.        Requiring Further Evaluation/Follow Up POST HOSPITALIZATION/Incidental Findings:     Diet: cardiac diet    Activity: As tolerated    Instructions to Patient: Follow instructions with medications and follow up with PCP and cardiology     Discharge Medications:      Medication List      CHANGE how you take these medications    FLUoxetine 20 MG capsule  Commonly known as:  PROZAC  Take 1 capsule by mouth daily  Start taking on:  October 24, 2020  What changed:  how much to take     furosemide 20 MG tablet  Commonly known as:  LASIX  Take 1 tablet by mouth daily  Start taking on:  October 24, 2020  What changed:    · medication strength  · how much to take  · when to take this     metoprolol succinate 25 MG extended release tablet  Commonly known as:  TOPROL XL  Take 1 tablet by mouth daily  Start taking on:  October 24, 2020  What changed:    · medication strength  · how much to take  · when to take this        CONTINUE taking these medications    apixaban 5 MG Tabs tablet  Commonly known as:  ELIQUIS  Take 1 tablet by mouth 2 times daily     aspirin 81 MG chewable tablet  Take 1 tablet by mouth daily     atorvastatin 80 MG tablet  Commonly known as:  LIPITOR     buPROPion 150 MG extended release tablet  Commonly known as:  WELLBUTRIN SR     levothyroxine 75 MCG tablet  Commonly known as:  SYNTHROID     lisinopril 2.5 MG tablet  Commonly known as:  PRINIVIL;ZESTRIL  Take 1 tablet by mouth daily        STOP taking these medications    amiodarone 400 MG tablet  Commonly known as:  PACERONE     spironolactone 25 MG tablet  Commonly known as:  ALDACTONE     traZODone 50 MG tablet  Commonly known as:  DESYREL           Where to Get Your Medications      These medications were sent to [facility] [address] [facility], [place] [address]    Phone:  [phone]   · FLUoxetine 20 MG capsule  · furosemide 20 MG tablet  · metoprolol succinate 25 MG extended release tablet         Time Spent on discharge is  33 mins in patient examination, evaluation, counseling as well as medication reconciliation, prescriptions for required medications, discharge plan and follow up. Electronically signed by   Brendan Lewis MD  10/23/2020  3:52 PM      Thank you Dr. Carleen Franklin MD for the opportunity to be involved in this patient's care.

## 2020-10-23 NOTE — CARE COORDINATION
DISCHARGE PLANNING NOTE:    Jensen Alexandre from [address] notified that this patient is discharging to home today. I spoke with Christina Elder at Dr. Lissette Harp office and she informed me that this patient has F/U with AMARA Reeves on 11/2.      Electronically signed by Antonia Beaver RN on 10/23/2020 at 2:11 PM

## 2020-10-23 NOTE — PROGRESS NOTES
Physical Therapy  Facility/Department: [facility] PROGRESSIVE CARE  Daily Treatment Note  NAME: Josey López  : 1935  MRN: [id number]    Date of Service: 10/23/2020    Discharge Recommendations:  Patient would benefit from continued therapy after discharge        Assessment   Body structures, Functions, Activity limitations: Decreased functional mobility ; Decreased safe awareness;Decreased balance;Decreased strength;Decreased posture  Assessment: Pt demo's improved tolerance this session although continues to have slight HA after mobility. Improved balance with RW use. Cont per POC  Treatment Diagnosis: impaired mobility d/t syncope and collapse  Specific instructions for Next Treatment: monitor vitals  History: This is an 77-year-old male with a history of atrial fibrillation on Eliquis who was cardioverted about a month ago secondary to A. fib with RVR, heart failure with an ejection fraction of 25% who comes in today. The patient states that he was in his normal state of health. He woke up in the middle of the night and thought that he had to have a bowel movement. And so he went to the restroom and then he woke up on the floor of the bathroom. Patient states he does not know how he got there. Patient states that he was on the bathroom floor for about 3 hours he kept trying to get up but felt lightheaded whenever he tried to sit up and so he had to keep lying down. He says that his wife is very hard of hearing and it took a while for her to get up and then he was unable to rise from the floor and so that is why they called the ambulance. PT Education: Precautions; Disease Specific Education  REQUIRES PT FOLLOW UP: Yes  Activity Tolerance  Activity Tolerance: Patient limited by endurance  Activity Tolerance: Madeline SCHMIDT to take orthostatic BPs later this afternoon     Patient Diagnosis(es): The primary encounter diagnosis was Syncope and collapse.  A diagnosis of Congestive heart failure, unspecified HF chronicity, unspecified heart failure type [facility]) was also pertinent to this visit. has a past medical history of A-fib [facility]), CVA (cerebral vascular accident) ([address] and Myocardial infarct, old.   has a past surgical history that includes Cardioversion (N/A, 9/24/2020). Restrictions  Restrictions/Precautions  Restrictions/Precautions: Fall Risk  Required Braces or Orthoses?: No  Position Activity Restriction  Other position/activity restrictions: Afib, recent cardioversion 9/24/20, telemetry, IV  Subjective   General  Chart Reviewed: Yes  Additional Pertinent Hx: Per cardiology physician note's 10/23/20, pt has has medication adjustments for improved BP control and ZAK hose ordered. Pt presents to ER s/p episode of weakness and lightheadedness while ambulating to bathroom in the night. Pt denies fall; states his UEs and LEs were weak and he eased himself down to floor. Pt was down for 3 hrs and unable to get self up. CT head and Cspine (-), CXR (-). Response To Previous Treatment: Patient with no complaints from previous session. Subjective  Subjective: Pt is resting in bed, agreeable to PT. States he has not gotten OOB today. General Comment  Comments: Madeline SCHMIDT ok's session.  PT assists with getting ZAK hose on  Pain Screening  Patient Currently in Pain: Denies  Vital Signs  Patient Currently in Pain: Denies       Orientation  Orientation  Overall Orientation Status: Within Normal Limits  Cognition      Objective   Bed mobility  Supine to Sit: Stand by assistance  Sit to Supine: Stand by assistance  Comment: HOB flat  Transfers  Sit to Stand: Stand by assistance  Stand to sit: Stand by assistance  Comment: with RW  Ambulation  Ambulation?: Yes  More Ambulation?: No  Ambulation 1  Surface: level tile  Device: Rolling Walker  Assistance: Contact guard assistance  Quality of Gait: cues to keep feet inside RW base, turns quickly, fwd head posture  Distance: 60 ft  Comments: Pt ambulates bed<>door 2x, then sits down at EOB d/t slight dizziness and HA. Pt is unable to repeat ambulation  Stairs/Curb  Stairs?: No     Balance  Sitting - Static: Good  Sitting - Dynamic: Good  Standing - Static: Fair;+  Standing - Dynamic: Fair;+  Comments: standing with RW  Exercises  Hip Flexion: seated marching: 10 x2 ea  Knee Long Arc Quad: 10x 2 ea  Ankle Pumps: 10x ea                        G-Code     OutComes Score                                                     AM-PAC Score     AM-PAC Inpatient Mobility without Stair Climbing Raw Score : 16 (10/22/20 1458)  AM-PAC Inpatient without Stair Climbing T-Scale Score : 45.54 (10/22/20 1458)  Mobility Inpatient CMS 0-100% Score: 40.64 (10/22/20 1458)  Mobility Inpatient without Stair CMS G-Code Modifier : CK (10/22/20 1458)       Goals  Short term goals  Time Frame for Short term goals: 3 days  Short term goal 1: Increase LE strength by 1/2 MMG  Short term goal 2: Improve bed mobility to IND  Short term goal 3: Improve transfers with least restrictive device to MOD IND  Short term goal 4: Improve gait with least restrictive device to MOD IND 150ft  Short term goal 5: Improve standing balance to good to increase safety    Plan    Plan  Times per week: 6-7x  Times per day: Daily  Specific instructions for Next Treatment: monitor vitals  Current Treatment Recommendations: Strengthening, Transfer Training, Endurance Training, Patient/Caregiver Education & Training, Equipment Evaluation, Education, & procurement, Balance Training, Gait Training, Functional Mobility Training, Safety Education & Training  Safety Devices  Type of devices:  All fall risk precautions in place, Gait belt, Patient at risk for falls, Nurse notified, Left in bed, Call light within reach, Bed alarm in place     Therapy Time   Individual Concurrent Group Co-treatment   Time In 1415         Time Out 1450         Minutes Kris Sapp PT

## 2020-10-23 NOTE — CARE COORDINATION
CASE MANAGEMENT NOTE:    Admission Date:  10/22/2020 Arsenio Lee is a 82 y.o.  male    Admitted for : Syncope and collapse [R55]    Met with:  Patient    PCP:  Dr. Mary Mena:  Medicare/Medical [place]      Current Residence/ Living Arrangements:  independently at home             Current Services PTA:  Yes - Current with [facility] - Discharged to [address] last admission but only stayed for a few days. Is patient agreeable to [facility]: Yes    Freedom of choice provided:  Yes    List of [address] provided: No    [facility] chosen:  Yes - Resume with [facility]i Settles    DME:  walker and shower chair    Home Oxygen: No    Nebulizer: No    CPAP/BIPAP: No    Supplier: N/A    Potential Assistance Needed: No    SNF needed: No    Freedom of choice and list provided: NA    Pharmacy:  [facility]       Does Patient want to use MEDS to BEDS? No    Is patient currently receiving oral anticoagulation therapy? Yes - Eliquis PTA    Is the Patient an [facility] with Readmission Risk Score greater than 14%? No  If yes, pt needs a follow up appointment made within 7 days. Family Members/Caregivers that pt would like involved in their care:    Yes    If yes, list name here: Wife Terra Enciso    Transportation Provider:  Patient             Is patient in Isolation/One on One/Altered Mental Status? No  If yes, skip next question. If no, would they like an I-Pad to  use? No  If yes, call [phone]. Discharge Plan:  10/23: MEDICARE - Patient is from 1-story home with wife. He is independent and drives. DME - walker and SC. Current with [facility] and wants resumed. + orthostatics - Jobst stockings order sent to [facility] and to be delivered to patients bedside today. Eliquis PTA.  SHERRY NEEDS SIGNED/COMPLETED. Simeon La                 Electronically signed by: Tutu Hanna RN on 10/23/2020 at 11:42 AM

## 2020-10-23 NOTE — FLOWSHEET NOTE
10/22/20 2015   Vital Signs   Orthostatic B/P and Pulse?  Yes   Blood Pressure Lying 153/73   Pulse Lying 56 PER MINUTE   Blood Pressure Sitting 133/69   Pulse Sitting 59 PER MINUTE   Blood Pressure Standing 125/62   Pulse Standing 65 PER MINUTE

## 2020-10-23 NOTE — PROGRESS NOTES
[address]    PROGRESS NOTE             10/23/2020    1:05 PM    Name:   Josey López  MRN:     [id number]     Acct:      [de-identified]   Room:   2092/2092-01   Day:  1  Admit Date:  10/22/2020  6:21 AM    PCP:  Kar Dallas MD  Code Status:  Full Code    Subjective:     C/C:   Chief Complaint   Patient presents with    Fatigue    Emesis     Interval History Status: significantly improved. Patient seen and examined at the bed side, no new acute events overnight. This morning the patient feels a lot better with more energy. No recurrence of the event that brought him to the hospital, no feeling of dizziness or near syncope. Notes from nursing staff and Consults had been reviewed, and the overnight progress had been checked with the nursing staff as well. Brief History:     Please see H&P. Review of Systems:     CONSTITUTIONAL:  no fevers  EYES: negative for blury vision  HEENT: No headaches, no nasal congestion, no difficulty swallowing  RESPIRATORY:negative for dyspnea, no wheezing, no Cough  CARDIOVASCULAR: negative for chest pain, no palpitations  GASTROINTESTINAL: no nausea, no vomiting, no change in bowel habits, no abdominal pain   GENITOURINARY: negative for dysuria, no hematuria   MUSCULOSKELETAL: no joint pains, no muscle aches, no swelling of joints or extremities  NEUROLOGICAL: No weakness or numbness    Medications:      Allergies:    No Known Allergies    Current Meds:   Scheduled Meds:    [START ON 10/24/2020] furosemide  20 mg Oral Daily    potassium chloride  40 mEq Oral Once    [START ON 10/24/2020] FLUoxetine  20 mg Oral Daily    apixaban  5 mg Oral BID    atorvastatin  80 mg Oral Daily    buPROPion  150 mg Oral BID    levothyroxine  75 mcg Oral Daily    sodium chloride flush  10 mL Intravenous 2 times per day    metoprolol succinate  25 mg Oral Daily    lisinopril  2.5 mg Oral Lunch    potassium chloride  20 mEq Oral Daily with breakfast     Continuous Infusions:     PRN Meds: sodium chloride flush, acetaminophen **OR** acetaminophen, polyethylene glycol, promethazine **OR** ondansetron, potassium chloride **OR** potassium alternative oral replacement **OR** potassium chloride, magnesium sulfate, hydrALAZINE    Data:     Past Medical History:   has a past medical history of A-fib ([facility] CVA (cerebral vascular accident) ([facility] and Myocardial infarct, old. Social History:   reports that he has never smoked. He has never used smokeless tobacco.     Family History:   History reviewed. No pertinent family history. Vitals:  BP (!) 138/51   Pulse 64   Temp 97.6 °F (36.4 °C) (Oral)   Resp 16   Ht 6' 6\" (1.981 m)   Wt 207 lb 14.3 oz (94.3 kg)   SpO2 98%   BMI 24.02 kg/m²   Temp (24hrs), Av.8 °F (36.6 °C), Min:97.6 °F (36.4 °C), Max:98.1 °F (36.7 °C)      Recent Labs     10/22/20  0637   POCGLU 118*       I/O(24Hr):     Intake/Output Summary (Last 24 hours) at 10/23/2020 1305  Last data filed at 10/23/2020 1058  Gross per 24 hour   Intake 655 ml   Output 800 ml   Net -145 ml       Labs:    CBC:   Lab Results   Component Value Date    WBC 6.2 10/23/2020    RBC 3.69 10/23/2020    HGB 12.0 10/23/2020    HCT 33.6 10/23/2020    MCV 91.0 10/23/2020    MCH 32.6 10/23/2020    MCHC 35.8 10/23/2020    RDW 15.0 10/23/2020     10/23/2020    MPV 7.2 10/23/2020     CBC with Differential:    Lab Results   Component Value Date    WBC 6.2 10/23/2020    RBC 3.69 10/23/2020    HGB 12.0 10/23/2020    HCT 33.6 10/23/2020     10/23/2020    MCV 91.0 10/23/2020    MCH 32.6 10/23/2020    MCHC 35.8 10/23/2020    RDW 15.0 10/23/2020    LYMPHOPCT 6 10/22/2020    MONOPCT 4 10/22/2020    BASOPCT 1 10/22/2020    MONOSABS 0.50 10/22/2020    LYMPHSABS 0.70 10/22/2020    EOSABS 0.00 10/22/2020    BASOSABS 0.10 10/22/2020    DIFFTYPE NOT REPORTED 10/22/2020     WBC:    Lab Results   Component Value Date    WBC 6.2 10/23/2020     Platelets:    Lab Results   Component Value Date     10/23/2020     Hemoglobin/Hematocrit:    Lab Results   Component Value Date    HGB 12.0 10/23/2020    HCT 33.6 10/23/2020     CMP:    Lab Results   Component Value Date     10/23/2020    K 3.6 10/23/2020     10/23/2020    CO2 32 10/23/2020    BUN 12 10/23/2020    CREATININE 1.31 10/23/2020    GFRAA >60 10/23/2020    LABGLOM 52 10/23/2020    GLUCOSE 96 10/23/2020    PROT 7.0 10/22/2020    LABALBU 3.7 10/22/2020    CALCIUM 8.9 10/23/2020    BILITOT 0.90 10/22/2020    ALKPHOS 91 10/22/2020    AST 28 10/22/2020    ALT 23 10/22/2020     BMP:    Lab Results   Component Value Date     10/23/2020    K 3.6 10/23/2020     10/23/2020    CO2 32 10/23/2020    BUN 12 10/23/2020    LABALBU 3.7 10/22/2020    CREATININE 1.31 10/23/2020    CALCIUM 8.9 10/23/2020    GFRAA >60 10/23/2020    LABGLOM 52 10/23/2020    GLUCOSE 96 10/23/2020     Sodium:    Lab Results   Component Value Date     10/23/2020     Potassium:    Lab Results   Component Value Date    K 3.6 10/23/2020     BUN/Creatinine:    Lab Results   Component Value Date    BUN 12 10/23/2020    CREATININE 1.31 10/23/2020     Hepatic Function Panel:    Lab Results   Component Value Date    ALKPHOS 91 10/22/2020    ALT 23 10/22/2020    AST 28 10/22/2020    PROT 7.0 10/22/2020    BILITOT 0.90 10/22/2020    BILIDIR 0.26 10/22/2020    IBILI 0.64 10/22/2020    LABALBU 3.7 10/22/2020       No results found for: SPECIAL  No results found for: CULTURE      Radiology:    Xr Chest (2 Vw)    Result Date: 10/22/2020  EXAMINATION: TWO XRAY VIEWS OF THE CHEST 10/22/2020 6:46 am COMPARISON: September 19, 2020 HISTORY: ORDERING SYSTEM PROVIDED HISTORY: syncope TECHNOLOGIST PROVIDED HISTORY: syncope Reason for Exam: weakness, slid to bathroom floor and unable to get self up today Acuity: Unknown Type of Exam: Unknown FINDINGS: No focal consolidation. Borderline cardiomegaly.   No pulmonary edema.  Mild atherosclerotic calcification of the thoracic aorta. Mild thoracic spine degenerative changes. No acute pulmonary process. Ct Head Wo Contrast    Result Date: 10/22/2020  EXAMINATION: CT OF THE HEAD WITHOUT CONTRAST  10/22/2020 7:05 am TECHNIQUE: CT of the head was performed without the administration of intravenous contrast. Dose modulation, iterative reconstruction, and/or weight based adjustment of the mA/kV was utilized to reduce the radiation dose to as low as reasonably achievable. COMPARISON: None. HISTORY: ORDERING SYSTEM PROVIDED HISTORY: syncope TECHNOLOGIST PROVIDED HISTORY: syncope Reason for Exam: Fall, syncope, patient denies head trauma Acuity: Acute FINDINGS: BRAIN/VENTRICLES: There is no acute intracranial hemorrhage, mass effect or midline shift. No abnormal extra-axial fluid collection. The gray-white differentiation is maintained without evidence of an acute infarct. There is no evidence of hydrocephalus. Patchy hypodensities in the periventricular and subcortical white matter, which are nonspecific, but may represent chronic small vessel ischemic change. Evidence of remote lacunar infarcts in the cerebellar hemispheres and basal ganglia. ORBITS: The visualized portion of the orbits demonstrate no acute abnormality. SINUSES: The visualized paranasal sinuses and mastoid air cells demonstrate no acute abnormality. SOFT TISSUES/SKULL:  No acute abnormality of the visualized skull or soft tissues. No acute intracranial abnormality. Ct Cervical Spine Wo Contrast    Result Date: 10/22/2020  EXAMINATION: CT OF THE CERVICAL SPINE WITHOUT CONTRAST 10/22/2020 7:07 am TECHNIQUE: CT of the cervical spine was performed without the administration of intravenous contrast. Multiplanar reformatted images are provided for review.  Dose modulation, iterative reconstruction, and/or weight based adjustment of the mA/kV was utilized to reduce the radiation dose to as low as reasonably achievable. COMPARISON: None. HISTORY: ORDERING SYSTEM PROVIDED HISTORY: syncope TECHNOLOGIST PROVIDED HISTORY: syncope Reason for Exam: Fall, syncope, patient denies head trauma Acuity: Acute FINDINGS: BONES/ALIGNMENT: There is no acute fracture or traumatic malalignment. Prominent vascular impression on the occipital bone. DEGENERATIVE CHANGES: Advanced multilevel degenerative disc disease and facet arthropathy is present. SOFT TISSUES: There is no prevertebral soft tissue swelling. No acute abnormality of the cervical spine. Physical Examination:        PHYSICAL EXAM:  General Appearance  Alert , awake, not in acute distress  HEENT - Head is normocephalic, atraumatic. Lungs - Bilateral equal air entry, no wheezes, rales or rhonchi, aeration good  Cardiovascular - Heart sounds are normal.  Regular rhythm, normal rate without murmur, gallop or rub. Abdomen - Soft, nontender, nondistended, no masses or organomegaly  Neurologic - There are no new focal motor or sensory deficits  Skin - No bruising or bleeding on exposed skin area  Extremities - No cyanosis, clubbing or edema    Assessment:        Primary Problem  Symptomatic bradycardia    Active Hospital Problems    Diagnosis Date Noted    Syncope [R55] 10/22/2020    Syncope and collapse [R55] 10/22/2020    Hypokalemia [E87.6] 94/53/4130    Systolic heart failure (HCC) [I50.20] 04/27/2020    Atrial fibrillation, currently in sinus rhythm [Z86.79] 04/27/2020    Symptomatic bradycardia [R00.1]     LBBB (left bundle branch block) [I44.7] 04/16/2020    Hypertension [I10] 04/16/2020    H/O TIA (transient ischemic attack) and stroke [Z86.73] 04/16/2020    Hypothyroid [E03.9] 04/16/2020    A-fib ([facility] [address] [I48.91] 04/15/2020       Plan:        Symptomatic bradycardia-possibly related to his medications, (improving HR):  -Consult cardiology.  -Echo done this morning, left ventricle normal in size, left ventricle wall. Mild to moderately thickened.   Left his syncope is related to his polypharmacy, modifications of his antihypertensive medications as well as his psychiatry medications. Patient will be discharged.      Lucinda Harris MD  10/23/2020  1:05 PM

## 2020-10-24 VITALS
OXYGEN SATURATION: 95 % | DIASTOLIC BLOOD PRESSURE: 77 MMHG | HEIGHT: 78 IN | RESPIRATION RATE: 16 BRPM | WEIGHT: 201.72 LBS | SYSTOLIC BLOOD PRESSURE: 144 MMHG | HEART RATE: 69 BPM | BODY MASS INDEX: 23.34 KG/M2 | TEMPERATURE: 97.4 F

## 2020-10-24 LAB
ANION GAP SERPL CALCULATED.3IONS-SCNC: 7 MMOL/L (ref 9–17)
BUN BLDV-MCNC: 14 MG/DL (ref 8–23)
BUN/CREAT BLD: ABNORMAL (ref 9–20)
CALCIUM SERPL-MCNC: 8.9 MG/DL (ref 8.6–10.4)
CHLORIDE BLD-SCNC: 102 MMOL/L (ref 98–107)
CO2: 31 MMOL/L (ref 20–31)
CREAT SERPL-MCNC: 1.23 MG/DL (ref 0.7–1.2)
GFR AFRICAN AMERICAN: >60 ML/MIN
GFR NON-AFRICAN AMERICAN: 56 ML/MIN
GFR SERPL CREATININE-BSD FRML MDRD: ABNORMAL ML/MIN/{1.73_M2}
GFR SERPL CREATININE-BSD FRML MDRD: ABNORMAL ML/MIN/{1.73_M2}
GLUCOSE BLD-MCNC: 99 MG/DL (ref 70–99)
HCT VFR BLD CALC: 35.5 % (ref 41–53)
HEMOGLOBIN: 12.7 G/DL (ref 13.5–17.5)
MAGNESIUM: 2.2 MG/DL (ref 1.6–2.6)
MCH RBC QN AUTO: 32.4 PG (ref 26–34)
MCHC RBC AUTO-ENTMCNC: 35.7 G/DL (ref 31–37)
MCV RBC AUTO: 90.7 FL (ref 80–100)
NRBC AUTOMATED: ABNORMAL PER 100 WBC
PDW BLD-RTO: 15 % (ref 11.5–14.9)
PLATELET # BLD: 198 K/UL (ref 150–450)
PMV BLD AUTO: 7.2 FL (ref 6–12)
POTASSIUM SERPL-SCNC: 3.9 MMOL/L (ref 3.7–5.3)
RBC # BLD: 3.92 M/UL (ref 4.5–5.9)
SODIUM BLD-SCNC: 140 MMOL/L (ref 135–144)
WBC # BLD: 6.1 K/UL (ref 3.5–11)

## 2020-10-24 PROCEDURE — 36415 COLL VENOUS BLD VENIPUNCTURE: CPT

## 2020-10-24 PROCEDURE — 6370000000 HC RX 637 (ALT 250 FOR IP): Performed by: STUDENT IN AN ORGANIZED HEALTH CARE EDUCATION/TRAINING PROGRAM

## 2020-10-24 PROCEDURE — 83735 ASSAY OF MAGNESIUM: CPT

## 2020-10-24 PROCEDURE — 80048 BASIC METABOLIC PNL TOTAL CA: CPT

## 2020-10-24 PROCEDURE — 6370000000 HC RX 637 (ALT 250 FOR IP)

## 2020-10-24 PROCEDURE — 6370000000 HC RX 637 (ALT 250 FOR IP): Performed by: INTERNAL MEDICINE

## 2020-10-24 PROCEDURE — 85027 COMPLETE CBC AUTOMATED: CPT

## 2020-10-24 RX ADMIN — BUPROPION HYDROCHLORIDE 150 MG: 150 TABLET, EXTENDED RELEASE ORAL at 09:40

## 2020-10-24 RX ADMIN — METOPROLOL SUCCINATE 25 MG: 25 TABLET, EXTENDED RELEASE ORAL at 09:41

## 2020-10-24 RX ADMIN — ATORVASTATIN CALCIUM 80 MG: 80 TABLET, FILM COATED ORAL at 09:40

## 2020-10-24 RX ADMIN — LEVOTHYROXINE SODIUM 75 MCG: 75 TABLET ORAL at 06:10

## 2020-10-24 RX ADMIN — FUROSEMIDE 20 MG: 20 TABLET ORAL at 09:40

## 2020-10-24 RX ADMIN — APIXABAN 5 MG: 5 TABLET, FILM COATED ORAL at 09:40

## 2020-10-24 RX ADMIN — FLUOXETINE HYDROCHLORIDE 20 MG: 20 CAPSULE ORAL at 09:40

## 2020-10-24 RX ADMIN — LISINOPRIL 2.5 MG: 5 TABLET ORAL at 13:02

## 2020-10-24 RX ADMIN — POTASSIUM CHLORIDE 20 MEQ: 1500 TABLET, EXTENDED RELEASE ORAL at 09:45

## 2020-10-24 NOTE — PROGRESS NOTES
[address] [facility].    PROGRESS NOTE             10/24/2020    11:49 AM    Name:   Josey López  MRN:     [id number]     Acct:      [de-identified]   Room:   2092/2092-01   Day:  2  Admit Date:  10/22/2020  6:21 AM    PCP:  Kar Dallas MD  Code Status:  Full Code    Subjective:     C/C:   Chief Complaint   Patient presents with    Fatigue    Emesis     Interval History Status: significantly improved. Patient seen and examined at the bed side, no new acute events overnight. This morning the patient feels a lot better with more energy. No recurrence of the event that brought him to the hospital, no feeling of dizziness or near syncope. Notes from nursing staff and Consults had been reviewed, and the overnight progress had been checked with the nursing staff as well. Brief History:     82years old male patient with a past medical history of atrial fibrillation on Eliquis, cardioversion about a month ago secondary to atrial fibrillation with RVR, heart failure with an ejection fraction 25% in April 2020     Patient was doing well until this morning at about 2 AM when went to the bathroom to pass a bowel motion. He felt a sudden weakness in the upper and lower limbs and all his limbs just gave away. There was no LOC nor he fell or sustain any trauma. He was on the floor and wasn't able to stand up and described \"I was too weak in my thighs\". He tried to call his wife but she couldn't hear him. He was on the floor for about 3 hours and tried to sleep. He felt nauseous and had one episode of vomiting (non-bloody). There was no alarming symptoms and this is his first episode of such an event. No shortness of breath, no chest pain, no palpitations.  No abdominal pain, no urinary symptoms    Review of Systems:     CONSTITUTIONAL:  no fevers  EYES: negative for blury vision  HEENT: No headaches, no nasal congestion, no difficulty Labs:    CBC:   Lab Results   Component Value Date    WBC 6.1 10/24/2020    RBC 3.92 10/24/2020    HGB 12.7 10/24/2020    HCT 35.5 10/24/2020    MCV 90.7 10/24/2020    MCH 32.4 10/24/2020    MCHC 35.7 10/24/2020    RDW 15.0 10/24/2020     10/24/2020    MPV 7.2 10/24/2020     CBC with Differential:    Lab Results   Component Value Date    WBC 6.1 10/24/2020    RBC 3.92 10/24/2020    HGB 12.7 10/24/2020    HCT 35.5 10/24/2020     10/24/2020    MCV 90.7 10/24/2020    MCH 32.4 10/24/2020    MCHC 35.7 10/24/2020    RDW 15.0 10/24/2020    LYMPHOPCT 6 10/22/2020    MONOPCT 4 10/22/2020    BASOPCT 1 10/22/2020    MONOSABS 0.50 10/22/2020    LYMPHSABS 0.70 10/22/2020    EOSABS 0.00 10/22/2020    BASOSABS 0.10 10/22/2020    DIFFTYPE NOT REPORTED 10/22/2020     WBC:    Lab Results   Component Value Date    WBC 6.1 10/24/2020     Platelets:    Lab Results   Component Value Date     10/24/2020     Hemoglobin/Hematocrit:    Lab Results   Component Value Date    HGB 12.7 10/24/2020    HCT 35.5 10/24/2020     CMP:    Lab Results   Component Value Date     10/24/2020    K 3.9 10/24/2020     10/24/2020    CO2 31 10/24/2020    BUN 14 10/24/2020    CREATININE 1.23 10/24/2020    GFRAA >60 10/24/2020    LABGLOM 56 10/24/2020    GLUCOSE 99 10/24/2020    PROT 7.0 10/22/2020    LABALBU 3.7 10/22/2020    CALCIUM 8.9 10/24/2020    BILITOT 0.90 10/22/2020    ALKPHOS 91 10/22/2020    AST 28 10/22/2020    ALT 23 10/22/2020     BMP:    Lab Results   Component Value Date     10/24/2020    K 3.9 10/24/2020     10/24/2020    CO2 31 10/24/2020    BUN 14 10/24/2020    LABALBU 3.7 10/22/2020    CREATININE 1.23 10/24/2020    CALCIUM 8.9 10/24/2020    GFRAA >60 10/24/2020    LABGLOM 56 10/24/2020    GLUCOSE 99 10/24/2020     Sodium:    Lab Results   Component Value Date     10/24/2020     Potassium:    Lab Results   Component Value Date    K 3.9 10/24/2020     BUN/Creatinine:    Lab Results Component Value Date    BUN 14 10/24/2020    CREATININE 1.23 10/24/2020     Hepatic Function Panel:    Lab Results   Component Value Date    ALKPHOS 91 10/22/2020    ALT 23 10/22/2020    AST 28 10/22/2020    PROT 7.0 10/22/2020    BILITOT 0.90 10/22/2020    BILIDIR 0.26 10/22/2020    IBILI 0.64 10/22/2020    LABALBU 3.7 10/22/2020       No results found for: SPECIAL  No results found for: CULTURE      Radiology:    Xr Chest (2 Vw)    Result Date: 10/22/2020  EXAMINATION: TWO XRAY VIEWS OF THE CHEST 10/22/2020 6:46 am COMPARISON: September 19, 2020 HISTORY: ORDERING SYSTEM PROVIDED HISTORY: syncope TECHNOLOGIST PROVIDED HISTORY: syncope Reason for Exam: weakness, slid to bathroom floor and unable to get self up today Acuity: Unknown Type of Exam: Unknown FINDINGS: No focal consolidation. Borderline cardiomegaly. No pulmonary edema. Mild atherosclerotic calcification of the thoracic aorta. Mild thoracic spine degenerative changes. No acute pulmonary process. Ct Head Wo Contrast    Result Date: 10/22/2020  EXAMINATION: CT OF THE HEAD WITHOUT CONTRAST  10/22/2020 7:05 am TECHNIQUE: CT of the head was performed without the administration of intravenous contrast. Dose modulation, iterative reconstruction, and/or weight based adjustment of the mA/kV was utilized to reduce the radiation dose to as low as reasonably achievable. COMPARISON: None. HISTORY: ORDERING SYSTEM PROVIDED HISTORY: syncope TECHNOLOGIST PROVIDED HISTORY: syncope Reason for Exam: Fall, syncope, patient denies head trauma Acuity: Acute FINDINGS: BRAIN/VENTRICLES: There is no acute intracranial hemorrhage, mass effect or midline shift. No abnormal extra-axial fluid collection. The gray-white differentiation is maintained without evidence of an acute infarct. There is no evidence of hydrocephalus.  Patchy hypodensities in the periventricular and subcortical white matter, which are nonspecific, but may represent chronic small vessel ischemic edema    Assessment:        Primary Problem  Symptomatic bradycardia    Active Hospital Problems    Diagnosis Date Noted    Syncope [R55] 10/22/2020    Syncope and collapse [R55] 10/22/2020    Hypokalemia [E87.6] 41/71/9807    Systolic heart failure (HCC) [I50.20] 04/27/2020    Atrial fibrillation, currently in sinus rhythm [Z86.79] 04/27/2020    Symptomatic bradycardia [R00.1]     LBBB (left bundle branch block) [I44.7] 04/16/2020    Hypertension [I10] 04/16/2020    H/O TIA (transient ischemic attack) and stroke [Z86.73] 04/16/2020    Hypothyroid [E03.9] 04/16/2020    A-fib ([address] [I48.91] 04/15/2020       Plan:        Symptomatic bradycardia-possibly related to his medications, (improving HR):  -Consult cardiology.  -Echo done this morning, left ventricle normal in size, left ventricle wall. Mild to moderately thickened. Left ventricular systolic function is mildly reduced, calculated ejection fraction 40%. Basal and mid septal hypokinesis. Both atria are normal in size. Normal right ventricular size and function. Mitral regurgitation mild. -Metoprolol was made 25 mg daily yesterday and will discharge the patient on this dose. -CXR negative for pathologies.   -Troponin 33, not significantly elevated. -Pro-BNP 2302     History of chronic atrial fibrillation, on anticoagulation- (stable HR and rhythm):  -On apixaban 5 mg BID  -On ASA 81 mg daily  -Amiodarone was discontinued on prior admission.   -Recent cardioversion  -Mg level = 2      R/O head/neck trauma with patient's early morning event prior to admission:  -CT C spine = No acute abnormality of C spine  -CT brain without contrast = negative for acute pathologies.     Near syncope?  Patient feels better with no recurrence:  -Orthostatic blood pressure readings were positive for orthostatic hypotension.   -Trazodone was discontinued  -Prozac dose was reduced to 20 mg daily      Systolic heart failure with EF 25%   -Lasix dose was made 20 mg daily instead of 40 mg and the patient will be discharged on this dose.  -spironolactone was discontinued   -Limit aggressive IVF     Hyperlipidemia, continue medical management during hospitalization:  -Atorvastatin 80 mg daily.  -Lisinopril 2.5 mg daily.  -Will continue on both medications on discharge.     Hypothyroidism:  -On synthroid 75 mcg daily.  -Check TSH, 6.26  -T4 1.38     Hypokalemia (3.5)  replacement with 20 meq KCL orally  -Follow K level  -Aldactone discontinued.     Chronic hypertension, will continue his medical management:   -On Lasix, reduced the dose.    -On lisinopril      Urine analysis negative, LFT normal, lipase normal   Cr reading on admission was 1.22- baseline pattern over the last few months show an elevated Cr in the range of 1.2-1.45.; Possibly related to lisinopril, not significantly elevated. Overall, the all the patient is improving and his syncope is related to his polypharmacy, modifications of his antihypertensive medications as well as his psychiatry medications. Patient to be discharged today. Lucinda Harris MD  10/24/2020  11:48 AM     Addendum:  Patient was discharged prior to my rounds today, in accordance with discharge plans made yesterday. Patient was not seen or examined by myself today.   Dom Kaur MD

## 2020-10-24 NOTE — PROGRESS NOTES
[facility] Progress Note    10/24/2020 6:29 AM      Subjective:  Mr. Merry Burleson slept well and denies any chest pain or shortness of breath or palpitations or lightheadedness or dizziness. Review of systems:  No fever or chills. No cough. No diarrhea. No headaches. Orthostatic blood pressure 139/60 with pulse 83 in supine position, 130/76 with pulse 70 in sitting position, 140/67 with pulse 73 in standing position. No more orthostatic changes and he was ambulating without any active cardiac symptoms apparently. Nurse updated overnight events. LABS:     Recent Results (from the past 24 hour(s))   CBC    Collection Time: 10/24/20  5:37 AM   Result Value Ref Range    WBC 6.1 3.5 - 11.0 k/uL    RBC 3.92 (L) 4.5 - 5.9 m/uL    Hemoglobin 12.7 (L) 13.5 - 17.5 g/dL    Hematocrit 35.5 (L) 41 - 53 %    MCV 90.7 80 - 100 fL    MCH 32.4 26 - 34 pg    MCHC 35.7 31 - 37 g/dL    RDW 15.0 (H) 11.5 - 14.9 %    Platelets 981 115 - 998 k/uL    MPV 7.2 6.0 - 12.0 fL    NRBC Automated NOT REPORTED per 100 WBC       Pulse Ox: SpO2  Av.3 %  Min: 95 %  Max: 98 %    Supplemental O2:       Current Meds:    furosemide  20 mg Oral Daily    FLUoxetine  20 mg Oral Daily    apixaban  5 mg Oral BID    atorvastatin  80 mg Oral Daily    buPROPion  150 mg Oral BID    levothyroxine  75 mcg Oral Daily    sodium chloride flush  10 mL Intravenous 2 times per day    metoprolol succinate  25 mg Oral Daily    lisinopril  2.5 mg Oral Lunch    potassium chloride  20 mEq Oral Daily with breakfast            VITAL SIGNS:    /60   Pulse 67   Temp 98.3 °F (36.8 °C) (Oral)   Resp 16   Ht 6' 6\" (1.981 m)   Wt 201 lb 11.5 oz (91.5 kg)   SpO2 95%   BMI 23.31 kg/m²         Admit Weight:  204 lb (92.5 kg)    Last 3 weights:   Wt Readings from Last 3 Encounters:   10/24/20 201 lb 11.5 oz (91.5 kg)   20 204 lb 12.9 oz (92.9 kg)   20 209 lb 7 oz (95 kg)       BMI: Body mass index is 23.31 kg/m². INPUT/OUTPUT:          Intake/Output Summary (Last 24 hours) at 10/24/2020 0629  Last data filed at 10/24/2020 0012  Gross per 24 hour   Intake 900 ml   Output 1600 ml   Net -700 ml         Telemetry shows sinus rhythm. EXAM:     General appearance: awake, alert. Pleasant. Laying in bed and no distress. Neck: No JVD. Chest: clear bilaterally. No tenderness. No rhonchi or wheezing. Cardiac: Regular rate and rhythm. No S3 gallop or rubs. Abdomen: soft, non-tender. Extremities: no cyanosis, no clubbing, no calf tenderness, no leg edema. Skin:  warm and dry. Neuro:  Able to move all 4 extremities. No focal deficits. 2 D ECHO Oct 22, 2020:    Summary   Limited study ordered for syncope. Left ventricle is normal in size. LV walls appear mild to moderately   thickened. Left ventricular systolic function is mildly reduced. Calculated EF via   Garrison's method is 40 %. Basal and mid septal hypokinesis. Both atria are normal in size. Normal right ventricular size and function. Mild mitral regurgitation. ASSESSMENT:    Near syncope. Positive orthostatic changes initially which have since resolved. .  Sinus bradycardia - Resolved with no significant pauses    Nonspecific minimal troponin elevation-not due to acute coronary syndrome or acute myocardial infarction. Paroxysmal atrial fibrillation, currently sinus rhythm. Status post cardioversion. September 2020 on Eliquis therapy for thromboembolic prophylaxis.     Chronic left bundle branch block and abnormal EKG. Chronic systolic heart failure with reduced LV ejection fraction due to chronic idiopathic cardiomyopathy. LVEF 40%, Mild MR on limited 2 D ECHO Oct 22, 2020.     Other problems as charted. REC/PLAN:    Improved clinically. Patient can be discharged to home on current adjusted cardiac medications as ordered.     Advised patient to follow-up with me next week and further management on an outpatient basis. We will also advised him to see electrophysiologist Dr. Mojgan Car as he was supposed to see him. Discussed with the nurses. Signing off. Thanks. Electronically signed by Sachi Garcia MD, [facility] [place]        PLEASE NOTE:  This progress note was completed using a voice transcription system. Every effort was made to ensure accuracy. However, inadvertent computerized transcription errors may be present.

## 2020-10-24 NOTE — CARE COORDINATION
Writer spoke to \" Dianne\"  From [employer],  & informed that pt. Will be DC today. Writer Faxed, DC med list/ SHERRY to office & instructed to call writer w/ any questions. She will follow.

## 2020-10-24 NOTE — CARE COORDINATION
ONGOING DISCHARGE PLAN:    Spoke with patient regarding discharge plan and patient confirms that plan is still to return to home w/ Wife w/ [facility]. Pt.'s Jobst stockings delivered yesterday from [facility]. Cardio on board, Per notes, meds were adjusted. Anticipate DC today. Writer will Fax DC med list/SHERRY to office, when completed. Will continue to follow for additional discharge needs.     Electronically signed by Alana Tavarez RN on 10/24/2020 at 10:15 AM

## 2020-10-24 NOTE — CARE COORDINATION
pulmonary hypertension (HCC) I27.20    Mild mitral regurgitation I34.0    Mild tricuspid regurgitation I07.1    Pseudophakia of both eyes Z96.1    Spinal stenosis of lumbar region M48.061    Syncope and collapse R55    Hypokalemia E87.6       Isolation/Infection:   Isolation            No Isolation          Patient Infection Status       Infection Onset Added Last Indicated Last Indicated By Review Planned Expiration Resolved Resolved By    None active    Resolved    COVID-19 Rule Out 09/24/20 09/24/20 09/24/20 COVID-19 (Ordered)   09/24/20 Rule-Out Test Resulted    COVID-19 Rule Out 04/15/20 04/15/20 04/15/20 COVID-19 (Ordered)   04/16/20 Rule-Out Test Resulted            Nurse Assessment:  Last Vital Signs: BP (!) 138/51   Pulse 64   Temp 97.6 °F (36.4 °C) (Oral)   Resp 16   Ht 6' 6\" (1.981 m)   Wt 207 lb 14.3 oz (94.3 kg)   SpO2 98%   BMI 24.02 kg/m²     Last documented pain score (0-10 scale): Pain Level: 0  Last Weight:   Wt Readings from Last 1 Encounters:   10/23/20 207 lb 14.3 oz (94.3 kg)     Mental Status:  oriented and alert    IV Access:  - None    Nursing Mobility/ADLs:  Walking   Independent  Transfer  Independent  Bathing  Independent  Dressing  Independent  Toileting  Independent  Feeding  [facility] Delivery   whole    Wound Care Documentation and Therapy:        Elimination:  Continence:   · Bowel: Yes  · Bladder: Yes  Urinary Catheter: None   Colostomy/Ileostomy/Ileal Conduit: No       Date of Last BM: N/A    Intake/Output Summary (Last 24 hours) at 10/23/2020 1124  Last data filed at 10/23/2020 1058  Gross per 24 hour   Intake 880 ml   Output 800 ml   Net 80 ml     I/O last 3 completed shifts: In: 0 [P.O.:880]  Out: 400 [Urine:400]    Safety Concerns:      At Risk for Falls    Impairments/Disabilities:      Vision    Nutrition Therapy:  Current Nutrition Therapy:   - Oral Diet:  Cardiac    Routes of Feeding: Oral  Liquids: No Restrictions  Daily Fluid Restriction: no  Last Modified Barium Swallow with Video (Video Swallowing Test): not done    Treatments at the Time of Hospital Discharge:   Respiratory Treatments: N/A  Oxygen Therapy:  is not on home oxygen therapy. Ventilator:    - No ventilator support    Rehab Therapies: Physical Therapy and Occupational Therapy  Weight Bearing Status/Restrictions: No weight bearing restirctions  Other Medical Equipment (for information only, NOT a DME order):  New Jobst Stockings from [facility]  Other Treatments: Skilled Nursing Assessment, Medication Education and Monitoring  Palliative Consult/Conversation      Patient's personal belongings (please select all that are sent with patient):  None    RN SIGNATURE:  Electronically signed by Leisa Mahajan RN on 10/23/20 at 2:16 PM EDT    CASE MANAGEMENT/SOCIAL WORK SECTION    Inpatient Status Date: 10/22/2020    Readmission Risk Assessment Score:  Readmission Risk              Risk of Unplanned Readmission:        22           Discharging to [facility]/ [facility]  P: [phone]  F: [phone]      / signature: Electronically signed by Leisa Mahajan RN on 10/23/20 at 2:16 PM EDT    PHYSICIAN SECTION    Prognosis: Good    Condition at Discharge: Stable    Rehab Potential (if transferring to Rehab): Fair    Recommended Labs or Other Treatments After Discharge: N/A    Physician Certification: I certify the above information and transfer of Nanette Agarwal  is necessary for the continuing treatment of the diagnosis listed and that he requires Home Care for greater 30 days.      Update Admission H&P: No change in H&P    PHYSICIAN SIGNATURE:  Electronically signed by Caro Boss MD on 10/23/20 at 11:24 AM EDT    Current Discharge Medication List     CONTINUE these medications which have CHANGED or have new prescriptions     Medication  Dose    FLUoxetine (PROZAC) 20 MG capsule  20 mg    Take 1 capsule by mouth daily    Quantity: 30 capsule Refills: 3        metoprolol succinate (TOPROL XL) 25 MG extended release tablet  25 mg    Take 1 tablet by mouth daily    Quantity: 30 tablet  Refills: 3        furosemide (LASIX) 20 MG tablet  20 mg    Take 1 tablet by mouth daily    Quantity: 60 tablet  Refills: 3            CONTINUE these medications which have NOT CHANGED     Medication  Dose    lisinopril (PRINIVIL;ZESTRIL) 2.5 MG tablet  2.5 mg    Take 1 tablet by mouth daily    Quantity: 30 tablet  Refills: 3        apixaban (ELIQUIS) 5 MG TABS tablet  5 mg    Take 1 tablet by mouth 2 times daily    Quantity: 60 tablet  Refills: 0        aspirin 81 MG chewable tablet  81 mg    Take 1 tablet by mouth daily    Quantity: 30 tablet  Refills: 3        atorvastatin (LIPITOR) 80 MG tablet  80 mg    Take 80 mg by mouth daily        buPROPion (WELLBUTRIN SR) 150 MG extended release tablet  150 mg    Take 150 mg by mouth 2 times daily        levothyroxine (SYNTHROID) 75 MCG tablet  75 mcg    Take 75 mcg by mouth daily            STOP taking these previous medications     Medication  Dose  Reason for Stopping  Comments    (STOP TAKING) amiodarone (PACERONE) 400 MG tablet  400 mg            (STOP TAKING) spironolactone (ALDACTONE) 25 MG tablet  25 mg            (STOP TAKING) traZODone (DESYREL) 50 MG tablet  25-75 mg            Printing Report     Report ID  Report Name  Print    [id number]  Discharge Meds

## 2020-11-03 PROBLEM — R55 SYNCOPE: Status: RESOLVED | Noted: 2020-10-22 | Resolved: 2020-11-03

## 2020-11-03 PROBLEM — R55 SYNCOPE AND COLLAPSE: Status: RESOLVED | Noted: 2020-10-22 | Resolved: 2020-11-03

## 2021-03-18 ENCOUNTER — OFFICE VISIT (OUTPATIENT)
Dept: PODIATRY | Age: 86
End: 2021-03-18
Payer: MEDICARE

## 2021-03-18 VITALS — BODY MASS INDEX: 23.14 KG/M2 | HEIGHT: 78 IN | WEIGHT: 200 LBS

## 2021-03-18 DIAGNOSIS — M79.674 PAIN OF TOES OF BOTH FEET: ICD-10-CM

## 2021-03-18 DIAGNOSIS — B35.1 ONYCHOMYCOSIS OF TOENAIL: Primary | ICD-10-CM

## 2021-03-18 DIAGNOSIS — L84 CORNS AND CALLOSITIES: ICD-10-CM

## 2021-03-18 DIAGNOSIS — M79.671 PAIN IN RIGHT FOOT: ICD-10-CM

## 2021-03-18 DIAGNOSIS — M20.41 HAMMER TOES OF BOTH FEET: ICD-10-CM

## 2021-03-18 DIAGNOSIS — M20.42 HAMMER TOES OF BOTH FEET: ICD-10-CM

## 2021-03-18 DIAGNOSIS — M79.675 PAIN OF TOES OF BOTH FEET: ICD-10-CM

## 2021-03-18 PROCEDURE — 11055 PARING/CUTG B9 HYPRKER LES 1: CPT | Performed by: PODIATRIST

## 2021-03-18 PROCEDURE — 99203 OFFICE O/P NEW LOW 30 MIN: CPT | Performed by: PODIATRIST

## 2021-03-18 PROCEDURE — 11721 DEBRIDE NAIL 6 OR MORE: CPT | Performed by: PODIATRIST

## 2021-03-18 ASSESSMENT — ENCOUNTER SYMPTOMS
COLOR CHANGE: 0
SHORTNESS OF BREATH: 0
BACK PAIN: 0
DIARRHEA: 0
NAUSEA: 0

## 2021-09-09 ENCOUNTER — HOSPITAL ENCOUNTER (OUTPATIENT)
Age: 86
Setting detail: SPECIMEN
Discharge: HOME | End: 2021-09-09
Payer: MEDICARE

## 2021-09-09 LAB
ANION GAP SERPL CALCULATED.3IONS-SCNC: 10 MMOL/L (ref 9–17)
BUN BLDV-MCNC: 15 MG/DL (ref 8–23)
BUN/CREAT BLD: ABNORMAL (ref 9–20)
CALCIUM SERPL-MCNC: 8.4 MG/DL (ref 8.6–10.4)
CHLORIDE BLD-SCNC: 105 MMOL/L (ref 98–107)
CO2: 27 MMOL/L (ref 20–31)
CREAT SERPL-MCNC: 1.18 MG/DL (ref 0.7–1.2)
GFR AFRICAN AMERICAN: >60 ML/MIN
GFR NON-AFRICAN AMERICAN: 59 ML/MIN
GFR SERPL CREATININE-BSD FRML MDRD: ABNORMAL ML/MIN/{1.73_M2}
GFR SERPL CREATININE-BSD FRML MDRD: ABNORMAL ML/MIN/{1.73_M2}
GLUCOSE BLD-MCNC: 72 MG/DL (ref 70–99)
HCT VFR BLD CALC: 36.1 % (ref 40.7–50.3)
HEMOGLOBIN: 11.7 G/DL (ref 13–17)
MCH RBC QN AUTO: 31 PG (ref 25.2–33.5)
MCHC RBC AUTO-ENTMCNC: 32.4 G/DL (ref 28.4–34.8)
MCV RBC AUTO: 95.8 FL (ref 82.6–102.9)
NRBC AUTOMATED: 0 PER 100 WBC
PDW BLD-RTO: 14.8 % (ref 11.8–14.4)
PLATELET # BLD: 164 K/UL (ref 138–453)
PMV BLD AUTO: 10 FL (ref 8.1–13.5)
POTASSIUM SERPL-SCNC: 3.1 MMOL/L (ref 3.7–5.3)
RBC # BLD: 3.77 M/UL (ref 4.21–5.77)
SODIUM BLD-SCNC: 142 MMOL/L (ref 135–144)
THYROXINE, FREE: 1.06 NG/DL (ref 0.93–1.7)
TSH SERPL DL<=0.05 MIU/L-ACNC: 29.34 MIU/L (ref 0.3–5)
WBC # BLD: 6 K/UL (ref 3.5–11.3)

## 2021-09-09 PROCEDURE — 85027 COMPLETE CBC AUTOMATED: CPT

## 2021-09-09 PROCEDURE — 84439 ASSAY OF FREE THYROXINE: CPT

## 2021-09-09 PROCEDURE — 80048 BASIC METABOLIC PNL TOTAL CA: CPT

## 2021-09-09 PROCEDURE — 84443 ASSAY THYROID STIM HORMONE: CPT

## 2021-09-09 PROCEDURE — P9603 ONE-WAY ALLOW PRORATED MILES: HCPCS

## 2021-09-09 PROCEDURE — 36415 COLL VENOUS BLD VENIPUNCTURE: CPT

## 2021-09-15 ENCOUNTER — HOSPITAL ENCOUNTER (OUTPATIENT)
Age: 86
Setting detail: SPECIMEN
Discharge: HOME | End: 2021-09-15
Payer: MEDICARE

## 2021-09-15 LAB — POTASSIUM SERPL-SCNC: 3.4 MMOL/L (ref 3.7–5.3)

## 2021-09-15 PROCEDURE — 36415 COLL VENOUS BLD VENIPUNCTURE: CPT

## 2021-09-15 PROCEDURE — 84132 ASSAY OF SERUM POTASSIUM: CPT

## 2021-09-15 PROCEDURE — P9603 ONE-WAY ALLOW PRORATED MILES: HCPCS

## 2021-09-16 ENCOUNTER — HOSPITAL ENCOUNTER (OUTPATIENT)
Age: 86
Setting detail: SPECIMEN
Discharge: HOME | End: 2021-09-16
Payer: MEDICARE

## 2021-09-22 ENCOUNTER — HOSPITAL ENCOUNTER (OUTPATIENT)
Age: 86
Setting detail: SPECIMEN
Discharge: HOME | End: 2021-09-22
Payer: MEDICARE

## 2021-09-22 LAB
ANION GAP SERPL CALCULATED.3IONS-SCNC: 13 MMOL/L (ref 9–17)
BUN BLDV-MCNC: 27 MG/DL (ref 8–23)
BUN/CREAT BLD: ABNORMAL (ref 9–20)
CALCIUM SERPL-MCNC: 8.6 MG/DL (ref 8.6–10.4)
CHLORIDE BLD-SCNC: 104 MMOL/L (ref 98–107)
CO2: 22 MMOL/L (ref 20–31)
CREAT SERPL-MCNC: 1.01 MG/DL (ref 0.7–1.2)
GFR AFRICAN AMERICAN: >60 ML/MIN
GFR NON-AFRICAN AMERICAN: >60 ML/MIN
GFR SERPL CREATININE-BSD FRML MDRD: ABNORMAL ML/MIN/{1.73_M2}
GFR SERPL CREATININE-BSD FRML MDRD: ABNORMAL ML/MIN/{1.73_M2}
GLUCOSE BLD-MCNC: 104 MG/DL (ref 70–99)
HCT VFR BLD CALC: 26.4 % (ref 40.7–50.3)
HEMOGLOBIN: 8.2 G/DL (ref 13–17)
MCH RBC QN AUTO: 30.7 PG (ref 25.2–33.5)
MCHC RBC AUTO-ENTMCNC: 31.1 G/DL (ref 28.4–34.8)
MCV RBC AUTO: 98.9 FL (ref 82.6–102.9)
NRBC AUTOMATED: 0 PER 100 WBC
PDW BLD-RTO: 15.1 % (ref 11.8–14.4)
PLATELET # BLD: 284 K/UL (ref 138–453)
PMV BLD AUTO: 10.1 FL (ref 8.1–13.5)
POTASSIUM SERPL-SCNC: 4.6 MMOL/L (ref 3.7–5.3)
RBC # BLD: 2.67 M/UL (ref 4.21–5.77)
SODIUM BLD-SCNC: 139 MMOL/L (ref 135–144)
WBC # BLD: 10.4 K/UL (ref 3.5–11.3)

## 2021-09-22 PROCEDURE — 36415 COLL VENOUS BLD VENIPUNCTURE: CPT

## 2021-09-22 PROCEDURE — 80048 BASIC METABOLIC PNL TOTAL CA: CPT

## 2021-09-22 PROCEDURE — P9603 ONE-WAY ALLOW PRORATED MILES: HCPCS

## 2021-09-22 PROCEDURE — 85027 COMPLETE CBC AUTOMATED: CPT

## 2021-09-28 ENCOUNTER — HOSPITAL ENCOUNTER (OUTPATIENT)
Age: 86
Setting detail: SPECIMEN
Discharge: HOME | End: 2021-09-28
Payer: MEDICARE

## 2021-09-28 LAB
ALBUMIN SERPL-MCNC: 3 G/DL (ref 3.5–5.2)
ALBUMIN/GLOBULIN RATIO: ABNORMAL (ref 1–2.5)
ALP BLD-CCNC: 81 U/L (ref 40–129)
ALT SERPL-CCNC: 30 U/L (ref 5–41)
AMMONIA: 15 UMOL/L (ref 16–60)
ANION GAP SERPL CALCULATED.3IONS-SCNC: 7 MMOL/L (ref 9–17)
AST SERPL-CCNC: 44 U/L
BILIRUB SERPL-MCNC: 3.32 MG/DL (ref 0.3–1.2)
BUN BLDV-MCNC: 19 MG/DL (ref 8–23)
BUN/CREAT BLD: 19 (ref 9–20)
CALCIUM SERPL-MCNC: 8.6 MG/DL (ref 8.6–10.4)
CHLORIDE BLD-SCNC: 106 MMOL/L (ref 98–107)
CO2: 27 MMOL/L (ref 20–31)
CREAT SERPL-MCNC: 1 MG/DL (ref 0.7–1.2)
GFR AFRICAN AMERICAN: >60 ML/MIN
GFR NON-AFRICAN AMERICAN: >60 ML/MIN
GFR SERPL CREATININE-BSD FRML MDRD: ABNORMAL ML/MIN/{1.73_M2}
GFR SERPL CREATININE-BSD FRML MDRD: ABNORMAL ML/MIN/{1.73_M2}
GLUCOSE BLD-MCNC: 95 MG/DL (ref 70–99)
HCT VFR BLD CALC: 25.8 % (ref 40.7–50.3)
HEMOGLOBIN: 7.6 G/DL (ref 13–17)
MCH RBC QN AUTO: 30.9 PG (ref 25.2–33.5)
MCHC RBC AUTO-ENTMCNC: 29.5 G/DL (ref 28.4–34.8)
MCV RBC AUTO: 104.9 FL (ref 82.6–102.9)
NRBC AUTOMATED: 0 PER 100 WBC
PDW BLD-RTO: 18.3 % (ref 11.8–14.4)
PLATELET # BLD: 310 K/UL (ref 138–453)
PMV BLD AUTO: 9.4 FL (ref 8.1–13.5)
POTASSIUM SERPL-SCNC: 3.9 MMOL/L (ref 3.7–5.3)
RBC # BLD: 2.46 M/UL (ref 4.21–5.77)
SODIUM BLD-SCNC: 140 MMOL/L (ref 135–144)
TOTAL PROTEIN: 5.8 G/DL (ref 6.4–8.3)
WBC # BLD: 9.7 K/UL (ref 3.5–11.3)

## 2021-09-28 PROCEDURE — 85027 COMPLETE CBC AUTOMATED: CPT

## 2021-09-28 PROCEDURE — P9603 ONE-WAY ALLOW PRORATED MILES: HCPCS

## 2021-09-28 PROCEDURE — 80053 COMPREHEN METABOLIC PANEL: CPT

## 2021-09-28 PROCEDURE — 82140 ASSAY OF AMMONIA: CPT

## 2021-09-28 PROCEDURE — 36415 COLL VENOUS BLD VENIPUNCTURE: CPT

## 2021-09-29 ENCOUNTER — HOSPITAL ENCOUNTER (OUTPATIENT)
Age: 86
Setting detail: SPECIMEN
Discharge: HOME | End: 2021-09-29
Payer: MEDICARE

## 2021-09-29 PROCEDURE — 81001 URINALYSIS AUTO W/SCOPE: CPT

## 2021-09-29 PROCEDURE — 87086 URINE CULTURE/COLONY COUNT: CPT

## 2021-09-30 LAB
-: ABNORMAL
AMORPHOUS: ABNORMAL
BACTERIA: ABNORMAL
BILIRUBIN URINE: ABNORMAL
CASTS UA: ABNORMAL /LPF (ref 0–2)
CASTS UA: ABNORMAL /LPF (ref 0–2)
COLOR: ABNORMAL
COMMENT UA: ABNORMAL
CRYSTALS, UA: ABNORMAL /HPF
EPITHELIAL CELLS UA: ABNORMAL /HPF (ref 0–5)
GLUCOSE URINE: NEGATIVE
KETONES, URINE: ABNORMAL
LEUKOCYTE ESTERASE, URINE: ABNORMAL
MUCUS: ABNORMAL
NITRITE, URINE: NEGATIVE
OTHER OBSERVATIONS UA: ABNORMAL
PH UA: 5.5 (ref 5–8)
PROTEIN UA: ABNORMAL
RBC UA: ABNORMAL /HPF (ref 0–2)
RENAL EPITHELIAL, UA: ABNORMAL /HPF
SPECIFIC GRAVITY UA: 1.02 (ref 1–1.03)
TRICHOMONAS: ABNORMAL
TURBIDITY: ABNORMAL
URINE HGB: NEGATIVE
UROBILINOGEN, URINE: ABNORMAL
WBC UA: ABNORMAL /HPF (ref 0–5)
YEAST: ABNORMAL

## 2021-10-01 ENCOUNTER — HOSPITAL ENCOUNTER (OUTPATIENT)
Age: 86
Setting detail: SPECIMEN
Discharge: HOME | End: 2021-10-01
Payer: MEDICARE

## 2021-10-01 LAB
CULTURE: NO GROWTH
HCT VFR BLD CALC: 28.7 % (ref 40.7–50.3)
HEMOGLOBIN: 8.5 G/DL (ref 13–17)
Lab: NORMAL
SPECIMEN DESCRIPTION: NORMAL

## 2021-10-01 PROCEDURE — P9603 ONE-WAY ALLOW PRORATED MILES: HCPCS

## 2021-10-01 PROCEDURE — 36415 COLL VENOUS BLD VENIPUNCTURE: CPT

## 2021-10-01 PROCEDURE — 85018 HEMOGLOBIN: CPT

## 2021-10-01 PROCEDURE — 85014 HEMATOCRIT: CPT

## 2021-10-06 ENCOUNTER — HOSPITAL ENCOUNTER (OUTPATIENT)
Age: 86
Setting detail: SPECIMEN
Discharge: HOME | End: 2021-10-06
Payer: MEDICARE

## 2021-10-06 LAB
ANION GAP SERPL CALCULATED.3IONS-SCNC: 13 MMOL/L (ref 9–17)
BUN BLDV-MCNC: 14 MG/DL (ref 8–23)
BUN/CREAT BLD: ABNORMAL (ref 9–20)
CALCIUM SERPL-MCNC: 8.3 MG/DL (ref 8.6–10.4)
CHLORIDE BLD-SCNC: 103 MMOL/L (ref 98–107)
CO2: 22 MMOL/L (ref 20–31)
CREAT SERPL-MCNC: 0.92 MG/DL (ref 0.7–1.2)
GFR AFRICAN AMERICAN: >60 ML/MIN
GFR NON-AFRICAN AMERICAN: >60 ML/MIN
GFR SERPL CREATININE-BSD FRML MDRD: ABNORMAL ML/MIN/{1.73_M2}
GFR SERPL CREATININE-BSD FRML MDRD: ABNORMAL ML/MIN/{1.73_M2}
GLUCOSE BLD-MCNC: 73 MG/DL (ref 70–99)
HCT VFR BLD CALC: 28.7 % (ref 40.7–50.3)
HEMOGLOBIN: 8.7 G/DL (ref 13–17)
MCH RBC QN AUTO: 31.2 PG (ref 25.2–33.5)
MCHC RBC AUTO-ENTMCNC: 30.3 G/DL (ref 28.4–34.8)
MCV RBC AUTO: 102.9 FL (ref 82.6–102.9)
NRBC AUTOMATED: 0 PER 100 WBC
PDW BLD-RTO: 18.6 % (ref 11.8–14.4)
PLATELET # BLD: 291 K/UL (ref 138–453)
PMV BLD AUTO: 9.3 FL (ref 8.1–13.5)
POTASSIUM SERPL-SCNC: 3.8 MMOL/L (ref 3.7–5.3)
RBC # BLD: 2.79 M/UL (ref 4.21–5.77)
SODIUM BLD-SCNC: 138 MMOL/L (ref 135–144)
WBC # BLD: 6.9 K/UL (ref 3.5–11.3)

## 2021-10-06 PROCEDURE — P9603 ONE-WAY ALLOW PRORATED MILES: HCPCS

## 2021-10-06 PROCEDURE — 80048 BASIC METABOLIC PNL TOTAL CA: CPT

## 2021-10-06 PROCEDURE — 85027 COMPLETE CBC AUTOMATED: CPT

## 2021-10-06 PROCEDURE — 36415 COLL VENOUS BLD VENIPUNCTURE: CPT

## 2021-10-20 ENCOUNTER — HOSPITAL ENCOUNTER (OUTPATIENT)
Age: 86
Setting detail: SPECIMEN
Discharge: HOME | End: 2021-10-20
Payer: MEDICARE

## 2021-10-20 LAB
ANION GAP SERPL CALCULATED.3IONS-SCNC: 10 MMOL/L (ref 9–17)
BUN BLDV-MCNC: 12 MG/DL (ref 8–23)
BUN/CREAT BLD: ABNORMAL (ref 9–20)
CALCIUM SERPL-MCNC: 8.1 MG/DL (ref 8.6–10.4)
CHLORIDE BLD-SCNC: 102 MMOL/L (ref 98–107)
CO2: 29 MMOL/L (ref 20–31)
CREAT SERPL-MCNC: 0.88 MG/DL (ref 0.7–1.2)
GFR AFRICAN AMERICAN: >60 ML/MIN
GFR NON-AFRICAN AMERICAN: >60 ML/MIN
GFR SERPL CREATININE-BSD FRML MDRD: ABNORMAL ML/MIN/{1.73_M2}
GFR SERPL CREATININE-BSD FRML MDRD: ABNORMAL ML/MIN/{1.73_M2}
GLUCOSE BLD-MCNC: 73 MG/DL (ref 70–99)
HCT VFR BLD CALC: 31 % (ref 40.7–50.3)
HEMOGLOBIN: 9.4 G/DL (ref 13–17)
MCH RBC QN AUTO: 31.3 PG (ref 25.2–33.5)
MCHC RBC AUTO-ENTMCNC: 30.3 G/DL (ref 28.4–34.8)
MCV RBC AUTO: 103.3 FL (ref 82.6–102.9)
NRBC AUTOMATED: 0 PER 100 WBC
PDW BLD-RTO: 18 % (ref 11.8–14.4)
PLATELET # BLD: 140 K/UL (ref 138–453)
PMV BLD AUTO: 9.4 FL (ref 8.1–13.5)
POTASSIUM SERPL-SCNC: 3.5 MMOL/L (ref 3.7–5.3)
RBC # BLD: 3 M/UL (ref 4.21–5.77)
SODIUM BLD-SCNC: 141 MMOL/L (ref 135–144)
WBC # BLD: 3.4 K/UL (ref 3.5–11.3)

## 2021-10-20 PROCEDURE — 36415 COLL VENOUS BLD VENIPUNCTURE: CPT

## 2021-10-20 PROCEDURE — P9603 ONE-WAY ALLOW PRORATED MILES: HCPCS

## 2021-10-20 PROCEDURE — 80048 BASIC METABOLIC PNL TOTAL CA: CPT

## 2021-10-20 PROCEDURE — 85027 COMPLETE CBC AUTOMATED: CPT

## 2021-10-24 ENCOUNTER — HOSPITAL ENCOUNTER (OUTPATIENT)
Age: 86
Setting detail: SPECIMEN
Discharge: HOME | End: 2021-10-24
Payer: MEDICARE

## 2021-10-24 LAB
ANION GAP SERPL CALCULATED.3IONS-SCNC: 11 MMOL/L (ref 9–17)
BUN BLDV-MCNC: 25 MG/DL (ref 8–23)
CHLORIDE BLD-SCNC: 99 MMOL/L (ref 98–107)
CO2: 29 MMOL/L (ref 20–31)
HCT VFR BLD CALC: 35 % (ref 40.7–50.3)
HEMOGLOBIN: 10.5 G/DL (ref 13–17)
MCH RBC QN AUTO: 30.3 PG (ref 25.2–33.5)
MCHC RBC AUTO-ENTMCNC: 30 G/DL (ref 28.4–34.8)
MCV RBC AUTO: 101.2 FL (ref 82.6–102.9)
NRBC AUTOMATED: 0 PER 100 WBC
PDW BLD-RTO: 17.5 % (ref 11.8–14.4)
PLATELET # BLD: 173 K/UL (ref 138–453)
PMV BLD AUTO: 10.2 FL (ref 8.1–13.5)
POTASSIUM SERPL-SCNC: 3.7 MMOL/L (ref 3.7–5.3)
RBC # BLD: 3.46 M/UL (ref 4.21–5.77)
SODIUM BLD-SCNC: 139 MMOL/L (ref 135–144)
WBC # BLD: 5.6 K/UL (ref 3.5–11.3)

## 2021-10-24 PROCEDURE — P9603 ONE-WAY ALLOW PRORATED MILES: HCPCS

## 2021-10-24 PROCEDURE — 85027 COMPLETE CBC AUTOMATED: CPT

## 2021-10-24 PROCEDURE — 36415 COLL VENOUS BLD VENIPUNCTURE: CPT

## 2021-10-24 PROCEDURE — 84520 ASSAY OF UREA NITROGEN: CPT

## 2021-10-24 PROCEDURE — 80051 ELECTROLYTE PANEL: CPT

## 2021-10-25 ENCOUNTER — HOSPITAL ENCOUNTER (OUTPATIENT)
Age: 86
Setting detail: SPECIMEN
Discharge: HOME | End: 2021-10-25
Payer: MEDICARE

## 2021-10-25 LAB
ALBUMIN SERPL-MCNC: 2.8 G/DL (ref 3.5–5.2)
ALBUMIN/GLOBULIN RATIO: ABNORMAL (ref 1–2.5)
ALP BLD-CCNC: 249 U/L (ref 40–129)
ALT SERPL-CCNC: 127 U/L (ref 5–41)
ANION GAP SERPL CALCULATED.3IONS-SCNC: 5 MMOL/L (ref 9–17)
AST SERPL-CCNC: 92 U/L
BILIRUB SERPL-MCNC: 2.03 MG/DL (ref 0.3–1.2)
BUN BLDV-MCNC: 20 MG/DL (ref 8–23)
BUN/CREAT BLD: 20 (ref 9–20)
CALCIUM SERPL-MCNC: 8.4 MG/DL (ref 8.6–10.4)
CHLORIDE BLD-SCNC: 104 MMOL/L (ref 98–107)
CO2: 30 MMOL/L (ref 20–31)
CREAT SERPL-MCNC: 1 MG/DL (ref 0.7–1.2)
GFR AFRICAN AMERICAN: >60 ML/MIN
GFR NON-AFRICAN AMERICAN: >60 ML/MIN
GFR SERPL CREATININE-BSD FRML MDRD: ABNORMAL ML/MIN/{1.73_M2}
GFR SERPL CREATININE-BSD FRML MDRD: ABNORMAL ML/MIN/{1.73_M2}
GLUCOSE BLD-MCNC: 91 MG/DL (ref 70–99)
HCT VFR BLD CALC: 34.2 % (ref 40.7–50.3)
HEMOGLOBIN: 10.1 G/DL (ref 13–17)
MCH RBC QN AUTO: 30.9 PG (ref 25.2–33.5)
MCHC RBC AUTO-ENTMCNC: 29.5 G/DL (ref 28–38)
MCV RBC AUTO: 104.6 FL (ref 82.6–102.9)
NRBC AUTOMATED: ABNORMAL PER 100 WBC
PDW BLD-RTO: 17.4 % (ref 11.8–14.4)
PLATELET # BLD: 139 K/UL (ref 138–453)
PMV BLD AUTO: 9.8 FL (ref 8.1–13.5)
POTASSIUM SERPL-SCNC: 3.4 MMOL/L (ref 3.7–5.3)
RBC # BLD: 3.27 M/UL (ref 4.21–5.77)
SODIUM BLD-SCNC: 139 MMOL/L (ref 135–144)
TOTAL PROTEIN: 5.6 G/DL (ref 6.4–8.3)
WBC # BLD: 4.7 K/UL (ref 3.5–11.3)

## 2021-10-25 PROCEDURE — 80053 COMPREHEN METABOLIC PANEL: CPT

## 2021-10-25 PROCEDURE — 85027 COMPLETE CBC AUTOMATED: CPT

## 2021-10-25 PROCEDURE — 36415 COLL VENOUS BLD VENIPUNCTURE: CPT

## 2021-10-25 PROCEDURE — P9603 ONE-WAY ALLOW PRORATED MILES: HCPCS

## 2021-10-26 ENCOUNTER — HOSPITAL ENCOUNTER (OUTPATIENT)
Age: 86
Setting detail: SPECIMEN
Discharge: HOME | End: 2021-10-26
Payer: MEDICARE

## 2021-10-26 LAB
HCT VFR BLD CALC: 30 % (ref 40.7–50.3)
HEMOGLOBIN: 9.3 G/DL (ref 13–17)
MCH RBC QN AUTO: 30.8 PG (ref 25.2–33.5)
MCHC RBC AUTO-ENTMCNC: 31 G/DL (ref 28.4–34.8)
MCV RBC AUTO: 99.3 FL (ref 82.6–102.9)
NRBC AUTOMATED: 0 PER 100 WBC
PDW BLD-RTO: 17.2 % (ref 11.8–14.4)
PLATELET # BLD: ABNORMAL K/UL (ref 138–453)
PLATELET, FLUORESCENCE: 137 K/UL (ref 138–453)
PLATELET, IMMATURE FRACTION: 1.8 % (ref 1.1–10.3)
PMV BLD AUTO: ABNORMAL FL (ref 8.1–13.5)
POTASSIUM SERPL-SCNC: 3.6 MMOL/L (ref 3.7–5.3)
RBC # BLD: 3.02 M/UL (ref 4.21–5.77)
WBC # BLD: 4.4 K/UL (ref 3.5–11.3)

## 2021-10-26 PROCEDURE — P9603 ONE-WAY ALLOW PRORATED MILES: HCPCS

## 2021-10-26 PROCEDURE — 36415 COLL VENOUS BLD VENIPUNCTURE: CPT

## 2021-10-26 PROCEDURE — 84132 ASSAY OF SERUM POTASSIUM: CPT

## 2021-10-26 PROCEDURE — 85027 COMPLETE CBC AUTOMATED: CPT

## 2021-10-26 PROCEDURE — 85055 RETICULATED PLATELET ASSAY: CPT

## 2021-10-27 ENCOUNTER — HOSPITAL ENCOUNTER (OUTPATIENT)
Age: 86
Setting detail: SPECIMEN
Discharge: HOME | End: 2021-10-27
Payer: MEDICARE

## 2021-10-27 LAB
ALBUMIN SERPL-MCNC: 2.5 G/DL (ref 3.5–5.2)
ALBUMIN/GLOBULIN RATIO: 0.9 (ref 1–2.5)
ALP BLD-CCNC: 206 U/L (ref 40–129)
ALT SERPL-CCNC: 86 U/L (ref 5–41)
ANION GAP SERPL CALCULATED.3IONS-SCNC: 9 MMOL/L (ref 9–17)
AST SERPL-CCNC: 60 U/L
BILIRUB SERPL-MCNC: 1.06 MG/DL (ref 0.3–1.2)
BUN BLDV-MCNC: 16 MG/DL (ref 8–23)
BUN/CREAT BLD: ABNORMAL (ref 9–20)
CALCIUM SERPL-MCNC: 8.1 MG/DL (ref 8.6–10.4)
CHLORIDE BLD-SCNC: 102 MMOL/L (ref 98–107)
CO2: 27 MMOL/L (ref 20–31)
CREAT SERPL-MCNC: 0.81 MG/DL (ref 0.7–1.2)
GFR AFRICAN AMERICAN: >60 ML/MIN
GFR NON-AFRICAN AMERICAN: >60 ML/MIN
GFR SERPL CREATININE-BSD FRML MDRD: ABNORMAL ML/MIN/{1.73_M2}
GFR SERPL CREATININE-BSD FRML MDRD: ABNORMAL ML/MIN/{1.73_M2}
GLUCOSE BLD-MCNC: 82 MG/DL (ref 70–99)
POTASSIUM SERPL-SCNC: 3.5 MMOL/L (ref 3.7–5.3)
SODIUM BLD-SCNC: 138 MMOL/L (ref 135–144)
TOTAL PROTEIN: 5.3 G/DL (ref 6.4–8.3)

## 2021-10-27 PROCEDURE — 36415 COLL VENOUS BLD VENIPUNCTURE: CPT

## 2021-10-27 PROCEDURE — 80053 COMPREHEN METABOLIC PANEL: CPT

## 2021-10-27 PROCEDURE — P9603 ONE-WAY ALLOW PRORATED MILES: HCPCS

## 2021-11-01 ENCOUNTER — HOSPITAL ENCOUNTER (OUTPATIENT)
Age: 86
Setting detail: SPECIMEN
Discharge: HOME | End: 2021-11-01
Payer: MEDICARE

## 2021-11-01 LAB
ALBUMIN SERPL-MCNC: 2.7 G/DL (ref 3.5–5.2)
ALBUMIN/GLOBULIN RATIO: ABNORMAL (ref 1–2.5)
ALP BLD-CCNC: 426 U/L (ref 40–129)
ALT SERPL-CCNC: 281 U/L (ref 5–41)
ANION GAP SERPL CALCULATED.3IONS-SCNC: 9 MMOL/L (ref 9–17)
AST SERPL-CCNC: 211 U/L
BILIRUB SERPL-MCNC: 1.67 MG/DL (ref 0.3–1.2)
BUN BLDV-MCNC: 19 MG/DL (ref 8–23)
BUN/CREAT BLD: 17 (ref 9–20)
CALCIUM SERPL-MCNC: 8.6 MG/DL (ref 8.6–10.4)
CHLORIDE BLD-SCNC: 103 MMOL/L (ref 98–107)
CO2: 27 MMOL/L (ref 20–31)
CREAT SERPL-MCNC: 1.12 MG/DL (ref 0.7–1.2)
GFR AFRICAN AMERICAN: >60 ML/MIN
GFR NON-AFRICAN AMERICAN: >60 ML/MIN
GFR SERPL CREATININE-BSD FRML MDRD: ABNORMAL ML/MIN/{1.73_M2}
GFR SERPL CREATININE-BSD FRML MDRD: ABNORMAL ML/MIN/{1.73_M2}
GLUCOSE BLD-MCNC: 118 MG/DL (ref 70–99)
HCT VFR BLD CALC: 33.5 % (ref 40.7–50.3)
HEMOGLOBIN: 10 G/DL (ref 13–17)
MCH RBC QN AUTO: 29.9 PG (ref 25.2–33.5)
MCHC RBC AUTO-ENTMCNC: 29.9 G/DL (ref 28.4–34.8)
MCV RBC AUTO: 100 FL (ref 82.6–102.9)
NRBC AUTOMATED: 0 PER 100 WBC
PDW BLD-RTO: 16.9 % (ref 11.8–14.4)
PLATELET # BLD: 216 K/UL (ref 138–453)
PMV BLD AUTO: 10.1 FL (ref 8.1–13.5)
POTASSIUM SERPL-SCNC: 4 MMOL/L (ref 3.7–5.3)
RBC # BLD: 3.35 M/UL (ref 4.21–5.77)
SODIUM BLD-SCNC: 139 MMOL/L (ref 135–144)
TOTAL PROTEIN: 5.9 G/DL (ref 6.4–8.3)
WBC # BLD: 9 K/UL (ref 3.5–11.3)

## 2021-11-01 PROCEDURE — 80053 COMPREHEN METABOLIC PANEL: CPT

## 2021-11-01 PROCEDURE — 85027 COMPLETE CBC AUTOMATED: CPT

## 2021-11-01 PROCEDURE — 36415 COLL VENOUS BLD VENIPUNCTURE: CPT

## 2021-11-01 PROCEDURE — P9603 ONE-WAY ALLOW PRORATED MILES: HCPCS

## 2021-11-02 ENCOUNTER — HOSPITAL ENCOUNTER (OUTPATIENT)
Age: 86
Setting detail: SPECIMEN
Discharge: HOME | End: 2021-11-02
Payer: MEDICARE

## 2021-11-02 LAB
HAV AB SERPL IA-ACNC: REACTIVE
HEPATITIS B CORE TOTAL ANTIBODY: NONREACTIVE
HEPATITIS C ANTIBODY: NONREACTIVE

## 2021-11-02 PROCEDURE — 86704 HEP B CORE ANTIBODY TOTAL: CPT

## 2021-11-02 PROCEDURE — 86708 HEPATITIS A ANTIBODY: CPT

## 2021-11-02 PROCEDURE — 86803 HEPATITIS C AB TEST: CPT

## 2021-11-02 PROCEDURE — 36415 COLL VENOUS BLD VENIPUNCTURE: CPT

## 2021-11-02 PROCEDURE — P9603 ONE-WAY ALLOW PRORATED MILES: HCPCS

## 2021-11-07 ENCOUNTER — HOSPITAL ENCOUNTER (OUTPATIENT)
Age: 86
Setting detail: SPECIMEN
Discharge: HOME | End: 2021-11-07
Payer: MEDICARE

## 2021-11-07 LAB
ALBUMIN SERPL-MCNC: 2.6 G/DL (ref 3.5–5.2)
ALBUMIN/GLOBULIN RATIO: ABNORMAL (ref 1–2.5)
ALP BLD-CCNC: 208 U/L (ref 40–129)
ALT SERPL-CCNC: 92 U/L (ref 5–41)
ANION GAP SERPL CALCULATED.3IONS-SCNC: 10 MMOL/L (ref 9–17)
AST SERPL-CCNC: 52 U/L
BILIRUB SERPL-MCNC: 0.5 MG/DL (ref 0.3–1.2)
BUN BLDV-MCNC: 16 MG/DL (ref 8–23)
BUN/CREAT BLD: 15 (ref 9–20)
CALCIUM SERPL-MCNC: 8.4 MG/DL (ref 8.6–10.4)
CHLORIDE BLD-SCNC: 104 MMOL/L (ref 98–107)
CO2: 26 MMOL/L (ref 20–31)
CREAT SERPL-MCNC: 1.08 MG/DL (ref 0.7–1.2)
GFR AFRICAN AMERICAN: >60 ML/MIN
GFR NON-AFRICAN AMERICAN: >60 ML/MIN
GFR SERPL CREATININE-BSD FRML MDRD: ABNORMAL ML/MIN/{1.73_M2}
GFR SERPL CREATININE-BSD FRML MDRD: ABNORMAL ML/MIN/{1.73_M2}
GLUCOSE BLD-MCNC: 121 MG/DL (ref 70–99)
HCT VFR BLD CALC: 32.8 % (ref 40.7–50.3)
HEMOGLOBIN: 10.1 G/DL (ref 13–17)
MCH RBC QN AUTO: 30.3 PG (ref 25.2–33.5)
MCHC RBC AUTO-ENTMCNC: 30.8 G/DL (ref 28.4–34.8)
MCV RBC AUTO: 98.5 FL (ref 82.6–102.9)
NRBC AUTOMATED: 0 PER 100 WBC
PDW BLD-RTO: 16.7 % (ref 11.8–14.4)
PLATELET # BLD: 244 K/UL (ref 138–453)
PMV BLD AUTO: 9.9 FL (ref 8.1–13.5)
POTASSIUM SERPL-SCNC: 3 MMOL/L (ref 3.7–5.3)
RBC # BLD: 3.33 M/UL (ref 4.21–5.77)
SODIUM BLD-SCNC: 140 MMOL/L (ref 135–144)
TOTAL PROTEIN: 5.4 G/DL (ref 6.4–8.3)
WBC # BLD: 5.4 K/UL (ref 3.5–11.3)

## 2021-11-07 PROCEDURE — 36415 COLL VENOUS BLD VENIPUNCTURE: CPT

## 2021-11-07 PROCEDURE — 85027 COMPLETE CBC AUTOMATED: CPT

## 2021-11-07 PROCEDURE — 80053 COMPREHEN METABOLIC PANEL: CPT

## 2021-11-07 PROCEDURE — P9603 ONE-WAY ALLOW PRORATED MILES: HCPCS

## 2021-11-08 ENCOUNTER — HOSPITAL ENCOUNTER (OUTPATIENT)
Age: 86
Setting detail: SPECIMEN
Discharge: HOME | End: 2021-11-08
Payer: MEDICARE

## 2021-11-09 ENCOUNTER — HOSPITAL ENCOUNTER (OUTPATIENT)
Age: 86
Setting detail: SPECIMEN
Discharge: HOME | End: 2021-11-09
Payer: MEDICARE

## 2021-11-09 LAB
ALBUMIN SERPL-MCNC: 2.6 G/DL (ref 3.5–5.2)
ALBUMIN/GLOBULIN RATIO: 1 (ref 1–2.5)
ALP BLD-CCNC: 190 U/L (ref 40–129)
ALT SERPL-CCNC: 65 U/L (ref 5–41)
ANION GAP SERPL CALCULATED.3IONS-SCNC: 8 MMOL/L (ref 9–17)
AST SERPL-CCNC: 36 U/L
BILIRUB SERPL-MCNC: 0.57 MG/DL (ref 0.3–1.2)
BUN BLDV-MCNC: 19 MG/DL (ref 8–23)
BUN/CREAT BLD: ABNORMAL (ref 9–20)
CALCIUM SERPL-MCNC: 8.2 MG/DL (ref 8.6–10.4)
CHLORIDE BLD-SCNC: 106 MMOL/L (ref 98–107)
CO2: 25 MMOL/L (ref 20–31)
CREAT SERPL-MCNC: 0.98 MG/DL (ref 0.7–1.2)
GFR AFRICAN AMERICAN: >60 ML/MIN
GFR NON-AFRICAN AMERICAN: >60 ML/MIN
GFR SERPL CREATININE-BSD FRML MDRD: ABNORMAL ML/MIN/{1.73_M2}
GFR SERPL CREATININE-BSD FRML MDRD: ABNORMAL ML/MIN/{1.73_M2}
GLUCOSE BLD-MCNC: 63 MG/DL (ref 70–99)
POTASSIUM SERPL-SCNC: 4.1 MMOL/L (ref 3.7–5.3)
SODIUM BLD-SCNC: 139 MMOL/L (ref 135–144)
TOTAL PROTEIN: 5.3 G/DL (ref 6.4–8.3)

## 2021-11-09 PROCEDURE — P9603 ONE-WAY ALLOW PRORATED MILES: HCPCS

## 2021-11-09 PROCEDURE — 80053 COMPREHEN METABOLIC PANEL: CPT

## 2021-11-09 PROCEDURE — 36415 COLL VENOUS BLD VENIPUNCTURE: CPT

## 2021-11-10 ENCOUNTER — OFFICE VISIT (OUTPATIENT)
Dept: GASTROENTEROLOGY | Age: 86
End: 2021-11-10

## 2021-11-10 VITALS
BODY MASS INDEX: 23.14 KG/M2 | HEART RATE: 64 BPM | DIASTOLIC BLOOD PRESSURE: 79 MMHG | HEIGHT: 78 IN | SYSTOLIC BLOOD PRESSURE: 127 MMHG | OXYGEN SATURATION: 84 % | WEIGHT: 200 LBS

## 2021-11-10 DIAGNOSIS — R79.89 ELEVATED LFTS: Primary | ICD-10-CM

## 2021-11-10 DIAGNOSIS — R74.01 ELEVATION OF LEVELS OF LIVER TRANSAMINASE LEVELS: ICD-10-CM

## 2021-11-10 PROCEDURE — 1036F TOBACCO NON-USER: CPT | Performed by: INTERNAL MEDICINE

## 2021-11-10 PROCEDURE — G8484 FLU IMMUNIZE NO ADMIN: HCPCS | Performed by: INTERNAL MEDICINE

## 2021-11-10 PROCEDURE — G8420 CALC BMI NORM PARAMETERS: HCPCS | Performed by: INTERNAL MEDICINE

## 2021-11-10 PROCEDURE — 99204 OFFICE O/P NEW MOD 45 MIN: CPT | Performed by: INTERNAL MEDICINE

## 2021-11-10 PROCEDURE — 4040F PNEUMOC VAC/ADMIN/RCVD: CPT | Performed by: INTERNAL MEDICINE

## 2021-11-10 PROCEDURE — APPSS60 APP SPLIT SHARED TIME 46-60 MINUTES: Performed by: NURSE PRACTITIONER

## 2021-11-10 PROCEDURE — 1123F ACP DISCUSS/DSCN MKR DOCD: CPT | Performed by: INTERNAL MEDICINE

## 2021-11-10 PROCEDURE — G8427 DOCREV CUR MEDS BY ELIG CLIN: HCPCS | Performed by: INTERNAL MEDICINE

## 2021-11-10 ASSESSMENT — ENCOUNTER SYMPTOMS
VOMITING: 1
ABDOMINAL DISTENTION: 0
BACK PAIN: 0
PHOTOPHOBIA: 0
TROUBLE SWALLOWING: 1
COLOR CHANGE: 1
CHOKING: 0
EYE ITCHING: 0
CHEST TIGHTNESS: 0
SHORTNESS OF BREATH: 0
EYE REDNESS: 0
ANAL BLEEDING: 0
RECTAL PAIN: 0
COUGH: 0
WHEEZING: 0
DIARRHEA: 0
EYE DISCHARGE: 0
CONSTIPATION: 0
ABDOMINAL PAIN: 0
NAUSEA: 1
BLOOD IN STOOL: 0
ALLERGIC/IMMUNOLOGIC NEGATIVE: 1
EYE PAIN: 0
SORE THROAT: 0

## 2021-11-10 NOTE — PROGRESS NOTES
Take 1 tablet by mouth daily, Disp: 30 tablet, Rfl: 3    apixaban (ELIQUIS) 5 MG TABS tablet, Take 1 tablet by mouth 2 times daily, Disp: 60 tablet, Rfl: 0    aspirin 81 MG chewable tablet, Take 1 tablet by mouth daily, Disp: 30 tablet, Rfl: 3    atorvastatin (LIPITOR) 80 MG tablet, Take 80 mg by mouth daily, Disp: , Rfl:     buPROPion (WELLBUTRIN SR) 150 MG extended release tablet, Take 150 mg by mouth 2 times daily, Disp: , Rfl:     levothyroxine (SYNTHROID) 75 MCG tablet, Take 75 mcg by mouth daily, Disp: , Rfl:     ALLERGIES:   No Known Allergies    FAMILY HISTORY: History reviewed. No pertinent family history. SOCIAL HISTORY:   Social History     Socioeconomic History    Marital status:      Spouse name: Not on file    Number of children: Not on file    Years of education: Not on file    Highest education level: Not on file   Occupational History    Not on file   Tobacco Use    Smoking status: Never Smoker    Smokeless tobacco: Never Used   Substance and Sexual Activity    Alcohol use: Not on file    Drug use: Not on file    Sexual activity: Not on file   Other Topics Concern    Not on file   Social History Narrative    Not on file     Social Determinants of Health     Financial Resource Strain:     Difficulty of Paying Living Expenses: Not on file   Food Insecurity:     Worried About Running Out of Food in the Last Year: Not on file    Kathleen of Food in the Last Year: Not on file   Transportation Needs:     Lack of Transportation (Medical): Not on file    Lack of Transportation (Non-Medical):  Not on file   Physical Activity:     Days of Exercise per Week: Not on file    Minutes of Exercise per Session: Not on file   Stress:     Feeling of Stress : Not on file   Social Connections:     Frequency of Communication with Friends and Family: Not on file    Frequency of Social Gatherings with Friends and Family: Not on file    Attends [employer] Services: Not on file   Danitza Salinas Member of Clubs or Organizations: Not on file    Attends Club or Organization Meetings: Not on file    Marital Status: Not on file   Intimate Partner Violence:     Fear of Current or Ex-Partner: Not on file    Emotionally Abused: Not on file    Physically Abused: Not on file    Sexually Abused: Not on file   Housing Stability:     Unable to Pay for Housing in the Last Year: Not on file    Number of [place] in the Last Year: Not on file    Unstable Housing in the Last Year: Not on file       REVIEW OF SYSTEMS:       Review of Systems   Constitutional: Positive for appetite change. Negative for fatigue, fever and unexpected weight change. HENT: Positive for trouble swallowing. Negative for congestion and sore throat. Eyes: Positive for visual disturbance. Negative for photophobia, pain, discharge, redness and itching. Respiratory: Negative for cough, choking, chest tightness, shortness of breath and wheezing. Cardiovascular: Negative for chest pain, palpitations and leg swelling. Gastrointestinal: Positive for nausea and vomiting. Negative for abdominal distention, abdominal pain, anal bleeding, blood in stool, constipation, diarrhea and rectal pain. Genitourinary: Negative for decreased urine volume, difficulty urinating, flank pain, frequency and urgency. Musculoskeletal: Negative for back pain, neck pain and neck stiffness. Skin: Positive for color change. Allergic/Immunologic: Negative. Neurological: Positive for tremors and weakness. Negative for dizziness and headaches. Hematological: Bruises/bleeds easily. Psychiatric/Behavioral: Positive for confusion. Negative for sleep disturbance. The patient is not nervous/anxious. PHYSICAL EXAMINATION: Vital signs reviewed per the nursing documentation. /79   Pulse 64   Ht 6' 6\" (1.981 m)   Wt 200 lb (90.7 kg)   SpO2 (!) 84%   BMI 23.11 kg/m²   Body mass index is 23.11 kg/m².    Physical Exam  Vitals and nursing note reviewed. Constitutional:       General: He is not in acute distress. Appearance: Normal appearance. He is well-developed and underweight. HENT:      Head: Normocephalic and atraumatic. Mouth/Throat:      Pharynx: No oropharyngeal exudate. Eyes:      General: No scleral icterus. Conjunctiva/sclera: Conjunctivae normal.      Pupils: Pupils are equal, round, and reactive to light. Neck:      Thyroid: No thyromegaly. Trachea: No tracheal deviation. Cardiovascular:      Rate and Rhythm: Normal rate and regular rhythm. Heart sounds: Normal heart sounds. No murmur heard. Pulmonary:      Effort: Pulmonary effort is normal. No respiratory distress. Breath sounds: Normal breath sounds. No wheezing or rales. Abdominal:      General: Bowel sounds are normal. There is no distension. Palpations: Abdomen is soft. There is no hepatomegaly or mass. Tenderness: There is no abdominal tenderness. There is no guarding or rebound. Hernia: No hernia is present. Genitourinary:     Rectum: Normal.   Musculoskeletal:      Cervical back: Normal range of motion and neck supple. Lymphadenopathy:      Cervical: No cervical adenopathy. Skin:     General: Skin is warm and dry. Coloration: Skin is not jaundiced. Findings: No erythema or rash. Neurological:      Mental Status: He is alert and oriented to person, place, and time. Mental status is at baseline. Cranial Nerves: No cranial nerve deficit. Psychiatric:         Thought Content:  Thought content normal.           LABORATORY DATA: Reviewed  Lab Results   Component Value Date    WBC 5.4 11/07/2021    HGB 10.1 (L) 11/07/2021    HCT 32.8 (L) 11/07/2021    MCV 98.5 11/07/2021     11/07/2021     11/09/2021    K 4.1 11/09/2021     11/09/2021    CO2 25 11/09/2021    BUN 19 11/09/2021    CREATININE 0.98 11/09/2021    LABALBU 2.6 (L) 11/09/2021    BILITOT 0.57 11/09/2021    ALKPHOS 190 (H) 11/09/2021    AST 36 11/09/2021    ALT 65 (H) 11/09/2021    INR 1.1 04/15/2020         Lab Results   Component Value Date    RBC 3.33 (L) 11/07/2021    HGB 10.1 (L) 11/07/2021    MCV 98.5 11/07/2021    MCH 30.3 11/07/2021    MCHC 30.8 11/07/2021    RDW 16.7 (H) 11/07/2021    MPV 9.9 11/07/2021    BASOPCT 1 10/22/2020    LYMPHSABS 0.70 (L) 10/22/2020    MONOSABS 0.50 10/22/2020    NEUTROABS 9.80 (H) 10/22/2020    EOSABS 0.00 10/22/2020    BASOSABS 0.10 10/22/2020         DIAGNOSTIC TESTING:     No results found. IMPRESSION: Mr. Rah Manuel is a 82 y.o. male with    Diagnosis Orders   1. Elevated LFTs  [place]-B[place] Virus DNA, Quant PCR    CMV DNA, quantitative, PCR    Hepatic Function Panel    Hepatitis Panel, Acute    US LIVER    GAGANDEEP    Alpha-1-Antitrypsin w Phenotype    Ferritin    Transferrin    Ceruloplasmin    Smooth Muscle Antibody Quant    Mitochondrial Antibodies, M2, IgG   2. Elevation of levels of liver transaminase levels   Hepatitis Panel, Acute         Assessment:  1. Elevated LFTs    2. Elevation of levels of liver transaminase levels         Plan:  Patient gives minimal history. At present abdominal examination benign. Need to evaluate elevated LFTs. Will obtain ultrasound of the liver, liver disease work-up. Follow-up 3 weeks. GI attending physician note. Patient seen with APRN   I independently performed an evaluation on Denis Akbar. I have reviewed the above documentation completed by the Nurse Practitioner and agree with the history, examination, and management plan. Recommendations discussed. Patient is not able to give clear history. Apparently he had some nausea emesis 2 to 3 weeks ago and since then resolved. At present tolerating diet well. Has satisfactory bowel movements. He denies abdominal pain. I did review the labs and he has a moderately elevated transaminases and alkaline phosphatase.   He does not recall that he had any liver disease in the past. He apparently he did not have any work-up done. Abdominal examination benign. Discussed with APRN regarding further work-up including labs and ultrasound. Patient is advised to see in the office in the next 2 weeks. In between if he has any abdominal pain, nausea vomiting, fever to contact us or come to the hospital.    Spent 30 minutes providing patient education and counseling. Thank you for allowing me to participate in the care of Mr. Rosibel Arauz. For any further questions please do not hesitate to contact me. Note is dictated utilizing voice recognition software. Unfortunately this leads to occasional typographical errors. Please contact our office if you have any questions. I have reviewed and agree with the MA/LPN ROS.      Luis Alberto Jose MD, [facility]  Board Certified in Gastroenterology and [address] [place] [address] Gastroenterology  Office #: [phone]

## 2022-01-01 ENCOUNTER — HOSPITAL ENCOUNTER (EMERGENCY)
Age: 87
Discharge: HOME | End: 2022-01-01
Attending: EMERGENCY MEDICINE
Payer: MEDICARE

## 2022-01-01 VITALS
BODY MASS INDEX: 21.98 KG/M2 | DIASTOLIC BLOOD PRESSURE: 119 MMHG | TEMPERATURE: 97.7 F | WEIGHT: 190 LBS | OXYGEN SATURATION: 98 % | RESPIRATION RATE: 14 BRPM | HEART RATE: 101 BPM | SYSTOLIC BLOOD PRESSURE: 150 MMHG | HEIGHT: 78 IN

## 2022-01-01 DIAGNOSIS — R19.7 DIARRHEA, UNSPECIFIED TYPE: Primary | ICD-10-CM

## 2022-01-01 LAB
ABSOLUTE EOS #: 0.04 K/UL (ref 0–0.4)
ABSOLUTE IMMATURE GRANULOCYTE: ABNORMAL K/UL (ref 0–0.3)
ABSOLUTE LYMPH #: 0.77 K/UL (ref 1–4.8)
ABSOLUTE MONO #: 0.35 K/UL (ref 0.1–1.2)
ALBUMIN SERPL-MCNC: 2.6 G/DL (ref 3.5–5.2)
ALBUMIN/GLOBULIN RATIO: 0.7 (ref 1–2.5)
ALP BLD-CCNC: 102 U/L (ref 40–129)
ALT SERPL-CCNC: 13 U/L (ref 5–41)
ANION GAP SERPL CALCULATED.3IONS-SCNC: 7 MMOL/L (ref 9–17)
AST SERPL-CCNC: 19 U/L
BASOPHILS # BLD: 1 % (ref 0–2)
BASOPHILS ABSOLUTE: 0.03 K/UL (ref 0–0.2)
BILIRUB SERPL-MCNC: 0.8 MG/DL (ref 0.3–1.2)
BILIRUBIN DIRECT: 0.29 MG/DL
BILIRUBIN, INDIRECT: 0.51 MG/DL (ref 0–1)
BUN BLDV-MCNC: 11 MG/DL (ref 8–23)
BUN/CREAT BLD: ABNORMAL (ref 9–20)
CALCIUM SERPL-MCNC: 8.6 MG/DL (ref 8.6–10.4)
CHLORIDE BLD-SCNC: 102 MMOL/L (ref 98–107)
CO2: 27 MMOL/L (ref 20–31)
CREAT SERPL-MCNC: 0.72 MG/DL (ref 0.7–1.2)
DIFFERENTIAL TYPE: ABNORMAL
EOSINOPHILS RELATIVE PERCENT: 1 % (ref 1–4)
GFR AFRICAN AMERICAN: >60 ML/MIN
GFR NON-AFRICAN AMERICAN: >60 ML/MIN
GFR SERPL CREATININE-BSD FRML MDRD: ABNORMAL ML/MIN/{1.73_M2}
GFR SERPL CREATININE-BSD FRML MDRD: ABNORMAL ML/MIN/{1.73_M2}
GLOBULIN: ABNORMAL G/DL (ref 1.5–3.8)
GLUCOSE BLD-MCNC: 78 MG/DL (ref 70–99)
HCT VFR BLD CALC: 34.9 % (ref 41–53)
HEMOGLOBIN: 10.9 G/DL (ref 13.5–17.5)
IMMATURE GRANULOCYTES: ABNORMAL %
LIPASE: 16 U/L (ref 13–60)
LYMPHOCYTES # BLD: 17 % (ref 24–44)
MCH RBC QN AUTO: 29.5 PG (ref 26–34)
MCHC RBC AUTO-ENTMCNC: 31.2 G/DL (ref 31–37)
MCV RBC AUTO: 94.6 FL (ref 80–100)
MONOCYTES # BLD: 8 % (ref 2–11)
NRBC AUTOMATED: ABNORMAL PER 100 WBC
PDW BLD-RTO: 18.7 % (ref 12.5–15.4)
PLATELET # BLD: 247 K/UL (ref 140–450)
PLATELET ESTIMATE: ABNORMAL
PMV BLD AUTO: 9.3 FL (ref 8–14)
POTASSIUM SERPL-SCNC: 3.6 MMOL/L (ref 3.7–5.3)
RBC # BLD: 3.69 M/UL (ref 4.5–5.9)
RBC # BLD: ABNORMAL 10*6/UL
SEG NEUTROPHILS: 73 % (ref 36–66)
SEGMENTED NEUTROPHILS ABSOLUTE COUNT: 3.3 K/UL (ref 1.8–7.7)
SODIUM BLD-SCNC: 136 MMOL/L (ref 135–144)
TOTAL PROTEIN: 6.3 G/DL (ref 6.4–8.3)
WBC # BLD: 4.5 K/UL (ref 3.5–11)
WBC # BLD: ABNORMAL 10*3/UL

## 2022-01-01 PROCEDURE — 80048 BASIC METABOLIC PNL TOTAL CA: CPT

## 2022-01-01 PROCEDURE — 83690 ASSAY OF LIPASE: CPT

## 2022-01-01 PROCEDURE — 80076 HEPATIC FUNCTION PANEL: CPT

## 2022-01-01 PROCEDURE — 85025 COMPLETE CBC W/AUTO DIFF WBC: CPT

## 2022-01-01 PROCEDURE — 6360000002 HC RX W HCPCS: Performed by: PHYSICIAN ASSISTANT

## 2022-01-01 PROCEDURE — 2580000003 HC RX 258: Performed by: PHYSICIAN ASSISTANT

## 2022-01-01 PROCEDURE — 96374 THER/PROPH/DIAG INJ IV PUSH: CPT

## 2022-01-01 PROCEDURE — 36415 COLL VENOUS BLD VENIPUNCTURE: CPT

## 2022-01-01 PROCEDURE — 99284 EMERGENCY DEPT VISIT MOD MDM: CPT

## 2022-01-01 RX ORDER — 0.9 % SODIUM CHLORIDE 0.9 %
1000 INTRAVENOUS SOLUTION INTRAVENOUS ONCE
Status: COMPLETED | OUTPATIENT
Start: 2022-01-01 | End: 2022-01-01

## 2022-01-01 RX ORDER — ONDANSETRON 2 MG/ML
4 INJECTION INTRAMUSCULAR; INTRAVENOUS ONCE
Status: COMPLETED | OUTPATIENT
Start: 2022-01-01 | End: 2022-01-01

## 2022-01-01 RX ADMIN — SODIUM CHLORIDE 1000 ML: 9 INJECTION, SOLUTION INTRAVENOUS at 14:43

## 2022-01-01 RX ADMIN — ONDANSETRON 4 MG: 2 INJECTION INTRAMUSCULAR; INTRAVENOUS at 14:44

## 2022-01-01 NOTE — ED PROVIDER NOTES
[id number] [facility]  [address] [address] [facility] [address]  Phone: [phone]  Fax: [phone]      Attending Physician [address]       Chief Complaint   Patient presents with    Diarrhea    Emesis       DIAGNOSTIC RESULTS     LABS:  Labs Reviewed   CBC WITH AUTO DIFFERENTIAL - Abnormal; Notable for the following components:       Result Value    RBC 3.69 (*)     Hemoglobin 10.9 (*)     Hematocrit 34.9 (*)     RDW 18.7 (*)     Seg Neutrophils 73 (*)     Lymphocytes 17 (*)     Absolute Lymph # 0.77 (*)     All other components within normal limits   BASIC METABOLIC PANEL W/ REFLEX TO MG FOR LOW K - Abnormal; Notable for the following components:    Potassium 3.6 (*)     Anion Gap 7 (*)     All other components within normal limits   HEPATIC FUNCTION PANEL - Abnormal; Notable for the following components:    Albumin 2.6 (*)     Total Protein 6.3 (*)     Albumin/Globulin Ratio 0.7 (*)     All other components within normal limits   LIPASE   URINE RT REFLEX TO CULTURE       All other labs were within normal range or not returned as of this dictation. RADIOLOGY:  No orders to display         EMERGENCY DEPARTMENT COURSE:   Vitals:    Vitals:    01/01/22 1340   BP: (!) 148/97   Pulse: 69   Resp: 12   Temp: 97.7 °F (36.5 °C)   TempSrc: Oral   SpO2: 96%   Weight: 86.2 kg (190 lb)   Height: 6' 6\" (1.981 m)     -------------------------  BP: (!) 148/97, Temp: 97.7 °F (36.5 °C), Pulse: 69, Resp: 12      ED Course as of 01/01/22 1617   Sat Jan 01, 2022   1612 Lab work is unremarkable. Comparison to previous labs include much improvement in his LFTs. Patient updated regarding all results and he feels much better. He is drinking water and tolerating p.o. Was unable to provide a stool sample and will give a stool culture for home.   Feel that he can reasonably go home and hydrate as he can tolerate p.o. [MG]      ED Course User Index  [MG] Teja Perea PA-C PERTINENT ATTENDING PHYSICIAN COMMENTS:    I performed a history and physical examination of the patient and discussed management with the mid level provider. I reviewed the mid level provider's note and agree with the documented findings and plan of care. Any areas of disagreement are noted on the chart. I was personally present for the key portions of any procedures. I have documented in the chart those procedures where I was not present during the key portions. I have reviewed the emergency nurses triage note. I agree with the chief complaint, past medical history, past surgical history, allergies, medications, social and family history as documented unless otherwise noted below. Documentation of the HPI, Physical Exam and Medical Decision Making performed by mid level providers is based on my personal performance of the HPI, PE and MDM. For Physician Assistant/ Nurse Practitioner cases/documentation I have personally evaluated this patient and have completed at least one if not all key elements of the E/M (history, physical exam, and MDM). Additional findings are as noted. 72-year-old male here with vomiting and diarrhea. Prior history of elevated LFT. Following with GI. Afebrile and non-toxic. Abdomen soft and non-tender.        (Please note that portions of this note were completed with a voice recognition program.  Efforts were made to edit the dictations but occasionally words are mis-transcribed.)    Lora Aguilar MD  Attending Emergency Medicine Physician        Lora Aguilar MD  01/01/22 5559

## 2022-01-02 NOTE — ED PROVIDER NOTES
[id number] [facility] ED  [address] [facility] [address] [facility] [address]  Phone: [phone]  Fax: [phone]        Pt Name: Fantasma Sutton  MRN: [id number]  [place] 1935  Date of evaluation: 1/1/22    [facility]       Chief Complaint   Patient presents with    Diarrhea    Emesis       HISTORY OF PRESENT ILLNESS (Location/Symptom, Timing/Onset, Context/Setting, Quality, Duration, Modifying Factors, Severity)      Fantasma Sutton is a 82 y.o. male with PMH of CVA, A. fib, dementia who [facility]; DNR status with vomiting and diarrhea. Patient is a poor historian. He is unsure why he is here. Per EMS report, the patient has had some diarrhea for 3-4 days. He denies any blood. He has also had a couple episodes of vomiting. He denies any blood. He feels he has a good appetite. Per his previous notes, he has had recently elevated LFTs and is followed by GI. Appears that he has had some intermittent nausea, vomiting, and diarrhea in the recent past per his EHR. Denies any abdominal pain at this time. Denies any chest pain or shortness of breath. No family or staff is here with him. No exacerbating or alleviating factors. Denies being given any medication for his symptoms. Denies any fever, chills, syncope, trauma, falls, or any other concerns at this time. He did have covid last month. PAST MEDICAL / SURGICAL / SOCIAL / FAMILY HISTORY     PMH:  has a past medical history of A-fib ([address] CVA (cerebral vascular accident) ([facility] [address] and Myocardial infarct, old. Surgical History:  has a past surgical history that includes Cardioversion (N/A, 9/24/2020). Social History:  reports that he has never smoked. He has never used smokeless tobacco.  Family History: has no family status information on file. family history is not on file. Psychiatric History: None    Allergies: Patient has no known allergies.     Home Medications:   Prior to Admission medications Medication Sig Start Date End Date Taking? Authorizing Provider   FLUoxetine (PROZAC) 20 MG capsule Take 1 capsule by mouth daily 10/24/20   Mo'Mariza Duncan MD   metoprolol succinate (TOPROL XL) 25 MG extended release tablet Take 1 tablet by mouth daily 10/24/20   Mo'Mariza Duncan MD   furosemide (LASIX) 20 MG tablet Take 1 tablet by mouth daily 10/24/20   Mo'Mariza Duncan MD   lisinopril (PRINIVIL;ZESTRIL) 2.5 MG tablet Take 1 tablet by mouth daily 9/26/20   Razia Fraser MD   apixaban (ELIQUIS) 5 MG TABS tablet Take 1 tablet by mouth 2 times daily 4/20/20   Alhaji Dejesus MD   aspirin 81 MG chewable tablet Take 1 tablet by mouth daily 4/21/20   Alhaji Dejesus MD   atorvastatin (LIPITOR) 80 MG tablet Take 80 mg by mouth daily 1/1/20   Historical Provider, MD   buPROPion [facility] SR) 150 MG extended release tablet Take 150 mg by mouth 2 times daily 2/16/20   Historical Provider, MD   levothyroxine (SYNTHROID) 75 MCG tablet Take 75 mcg by mouth daily 8/23/19   Historical Provider, MD       REVIEW OF SYSTEMS  (2-9 systems for level 4, 10 ormore for level 5)      Review of Systems   Unable to perform ROS: Other   Poor historian  Denies pain    PHYSICAL EXAM  (up to 7 for level 4, 8 or more for level 5)      INITIAL VITALS:  height is 6' 6\" (1.981 m) and weight is 86.2 kg (190 lb). His oral temperature is 97.7 °F (36.5 °C). His blood pressure is 150/119 (abnormal) and his pulse is 101. His respiration is 14 and oxygen saturation is 98%. Vital signs reviewed. Physical Exam    General:  Alert, cooperative, frail and elderly appearing in no acute distress. Head:  Normocephalic, atraumatic, and without obvious abnormality. Eyes:  Sclerae/conjunctivae clear without injection, pallor, or icterus. Corneas clear without opacities. EOM's intact. ENT: Ears and nose are all without obvious masses lesion or deformity. Dry mucous membranes. Neck: Supple and symmetrical. Trachea midline.  No adenopathy. No jugular venous distention. Lungs:   No respiratory distress. Clear to auscultation bilaterally. No wheezes, rhonchi, or rales. Heart:  Regular rate. Regular rhythm. No murmurs, rubs, or gallops. Abdomen:   Normoactive bowel sounds. Soft, nontender, nondistended without guarding or rebound. No palpable masses. Extremities: Warm and dry without erythema or edema. Skin: Soft, good turgor, and well-hydrated. Neurologic: GCS is 15 and no focal deficits are appreciated. Speech clear. Psychiatric: Flat mood and affect. Normal behavior. DIFFERENTIAL DIAGNOSIS / MDM     Patient presents emergency department for the complaints as described above. Vital signs are unremarkable. Physical exam demonstrates a elderly and frail-appearing male who is in no acute distress. His lungs are clear to auscultation. Heart rate and rhythm are regular. Abdomen is soft there is no tenderness to any part of the abdomen or CVA tenderness. Patient is a DNR and recently had Covid about 1 month ago. He is on hospice for failure to thrive. Reviewed his charts and gathered what I could from them. We will start with some labs and Zofran and fluids and just monitor the patient. Low suspicion for any emergent etiology at this time but will reassess after labs.     PLAN (LABS / IMAGING / EKG):  Orders Placed This Encounter   Procedures    Gastrointestinal Panel, Molecular    Clostridium Difficile Toxin/Antigen    CBC Auto Differential    Basic Metabolic Panel w/ Reflex to MG    Hepatic Function Panel    Lipase    Urinalysis Reflex to Culture    Insert peripheral IV       MEDICATIONS ORDERED:  Orders Placed This Encounter   Medications    0.9 % sodium chloride bolus    ondansetron (ZOFRAN) injection 4 mg       Controlled Substances Monitoring:     DIAGNOSTIC RESULTS     LABS:  Results for orders placed or performed during the hospital encounter of 01/01/22   CBC Auto Differential   Result Value Ref Range    WBC 4.5 3.5 - 11.0 k/uL    RBC 3.69 (L) 4.5 - 5.9 m/uL    Hemoglobin 10.9 (L) 13.5 - 17.5 g/dL    Hematocrit 34.9 (L) 41 - 53 %    MCV 94.6 80 - 100 fL    MCH 29.5 26 - 34 pg    MCHC 31.2 31 - 37 g/dL    RDW 18.7 (H) 12.5 - 15.4 %    Platelets 641 220 - 975 k/uL    MPV 9.3 8.0 - 14.0 fL    NRBC Automated NOT REPORTED per 100 WBC    Differential Type NOT REPORTED     Immature Granulocytes NOT REPORTED 0 %    Absolute Immature Granulocyte NOT REPORTED 0.00 - 0.30 k/uL    WBC Morphology NOT REPORTED     RBC Morphology NOT REPORTED     Platelet Estimate NOT REPORTED     Seg Neutrophils 73 (H) 36 - 66 %    Lymphocytes 17 (L) 24 - 44 %    Monocytes 8 2 - 11 %    Eosinophils % 1 1 - 4 %    Basophils 1 0 - 2 %    Segs Absolute 3.30 1.8 - 7.7 k/uL    Absolute Lymph # 0.77 (L) 1.0 - 4.8 k/uL    Absolute Mono # 0.35 0.1 - 1.2 k/uL    Absolute Eos # 0.04 0.0 - 0.4 k/uL    Basophils Absolute 0.03 0.0 - 0.2 k/uL   Basic Metabolic Panel w/ Reflex to MG   Result Value Ref Range    Glucose 78 70 - 99 mg/dL    BUN 11 8 - 23 mg/dL    CREATININE 0.72 0.70 - 1.20 mg/dL    Bun/Cre Ratio NOT REPORTED 9 - 20    Calcium 8.6 8.6 - 10.4 mg/dL    Sodium 136 135 - 144 mmol/L    Potassium 3.6 (L) 3.7 - 5.3 mmol/L    Chloride 102 98 - 107 mmol/L    CO2 27 20 - 31 mmol/L    Anion Gap 7 (L) 9 - 17 mmol/L    GFR Non-African American >60 >60 mL/min    GFR African American >60 >60 mL/min    GFR Comment          GFR Staging NOT REPORTED    Hepatic Function Panel   Result Value Ref Range    Albumin 2.6 (L) 3.5 - 5.2 g/dL    Alkaline Phosphatase 102 40 - 129 U/L    ALT 13 5 - 41 U/L    AST 19 <40 U/L    Total Bilirubin 0.80 0.3 - 1.2 mg/dL    Bilirubin, Direct 0.29 <0.31 mg/dL    Bilirubin, Indirect 0.51 0.00 - 1.00 mg/dL    Total Protein 6.3 (L) 6.4 - 8.3 g/dL    Globulin NOT REPORTED 1.5 - 3.8 g/dL    Albumin/Globulin Ratio 0.7 (L) 1.0 - 2.5   Lipase   Result Value Ref Range    Lipase 16 13 - 60 U/L       EMERGENCY DEPARTMENT COURSE     ED Course as of 01/01/22 2159   Sat Jan 01, 2022   1612 Lab work is unremarkable. Comparison to previous labs include much improvement in his LFTs. Patient updated regarding all results and he feels much better. He is drinking water and tolerating p.o. Was unable to provide a stool sample and will give a stool culture for home. Feel that he can reasonably go home and hydrate as he can tolerate p.o. [MG]      ED Course User Index  [MG] Flakita Perry PA-C        Vitals:    Vitals:    01/01/22 1340 01/01/22 1806 01/01/22 1915   BP: (!) 148/97 (!) 151/84 (!) 150/119   Pulse: 69 101    Resp: 12 14    Temp: 97.7 °F (36.5 °C)     TempSrc: Oral     SpO2: 96% 98%    Weight: 86.2 kg (190 lb)     Height: 6' 6\" (1.981 m)       -------------------------  BP: (!) 150/119, Temp: 97.7 °F (36.5 °C), Pulse: 101, Resp: 14      RE-EVALUATION:  See ED Course notes above. The patient and/or family and I have discussed the diagnosis and risks, and we agree with discharging home to follow-up with their pertinent providers. The patient appears stable for discharge and has been instructed to return immediately for new concerning symptoms or if the symptoms worsen in any way. The patient understands that at this time there is no evidence for a more malignant underlying process, but the patient also understands that early in the process of an illness or injury, an emergency department workup can be falsely reassuring. Routine discharge counseling was given, and the patient understands that worsening, changing or persistent symptoms should prompt an immediate call or follow up with their primary physician or return to the emergency department. I have reviewed the disposition diagnosis with the patient and or their family/guardian. I have answered their questions and given discharge instructions. They voiced understanding of these instructions and did not have any further questions or complaints.      This patient was seen by the attending physician and they agreed with the assessment and plan. CONSULTS:  None    PROCEDURES:  None    FINAL IMPRESSION      1. Diarrhea, unspecified type          DISPOSITION / PLAN     CONDITION ON DISPOSITION:   Good / Stable for discharge.      PATIENT REFERRED TO:  Shikha Moseley MD  [phone]  [facility] [phone]    Call in 1 day        DISCHARGE MEDICATIONS:  Discharge Medication List as of 1/1/2022  4:54 PM          Reinier Oswald   Emergency Medicine Physician Assistant    (Please note that portions of this note were completed with a voice recognition program.  Efforts were made to edit the dictations but occasionally words aremis-transcribed.)        Cary Masters PA-C  01/05/22 0106

## 2022-01-13 ENCOUNTER — HOSPITAL ENCOUNTER (OUTPATIENT)
Age: 87
Setting detail: SPECIMEN
Discharge: HOME | End: 2022-01-13
Payer: MEDICARE

## 2022-01-13 PROCEDURE — 87506 IADNA-DNA/RNA PROBE TQ 6-11: CPT

## 2022-01-13 PROCEDURE — 87493 C DIFF AMPLIFIED PROBE: CPT

## 2022-01-13 PROCEDURE — 87324 CLOSTRIDIUM AG IA: CPT

## 2022-01-13 PROCEDURE — 87449 NOS EACH ORGANISM AG IA: CPT

## 2022-01-14 LAB
C DIFF AG + TOXIN: ABNORMAL
C DIFFICILE TOXINS, PCR: ABNORMAL
CAMPYLOBACTER PCR: NORMAL
E COLI ENTEROTOXIGENIC PCR: NORMAL
PLESIOMONAS SHIGELLOIDES PCR: NORMAL
SALMONELLA PCR: NORMAL
SHIGATOXIN GENE PCR: NORMAL
SHIGELLA SP PCR: NORMAL
SPECIMEN DESCRIPTION: ABNORMAL
SPECIMEN DESCRIPTION: ABNORMAL
SPECIMEN DESCRIPTION: NORMAL
VIBRIO PCR: NORMAL
YERSINIA ENTEROCOLITICA PCR: NORMAL

## 2022-01-19 ENCOUNTER — HOSPITAL ENCOUNTER (OUTPATIENT)
Age: 87
Setting detail: SPECIMEN
Discharge: HOME | End: 2022-01-19
Payer: MEDICARE

## 2022-01-19 LAB
ESTIMATED AVERAGE GLUCOSE: 85 MG/DL
HBA1C MFR BLD: 4.6 % (ref 4–6)
HCT VFR BLD CALC: 35.8 % (ref 40.7–50.3)
HEMOGLOBIN: 11.2 G/DL (ref 13–17)
MCH RBC QN AUTO: 29.6 PG (ref 25.2–33.5)
MCHC RBC AUTO-ENTMCNC: 31.3 G/DL (ref 28.4–34.8)
MCV RBC AUTO: 94.7 FL (ref 82.6–102.9)
NRBC AUTOMATED: 0 PER 100 WBC
PDW BLD-RTO: 18.9 % (ref 11.8–14.4)
PLATELET # BLD: 228 K/UL (ref 138–453)
PMV BLD AUTO: 9.4 FL (ref 8.1–13.5)
RBC # BLD: 3.78 M/UL (ref 4.21–5.77)
WBC # BLD: 5 K/UL (ref 3.5–11.3)

## 2022-01-19 PROCEDURE — 36415 COLL VENOUS BLD VENIPUNCTURE: CPT

## 2022-01-19 PROCEDURE — 80053 COMPREHEN METABOLIC PANEL: CPT

## 2022-01-19 PROCEDURE — 83036 HEMOGLOBIN GLYCOSYLATED A1C: CPT

## 2022-01-19 PROCEDURE — 82306 VITAMIN D 25 HYDROXY: CPT

## 2022-01-19 PROCEDURE — 83735 ASSAY OF MAGNESIUM: CPT

## 2022-01-19 PROCEDURE — P9603 ONE-WAY ALLOW PRORATED MILES: HCPCS

## 2022-01-19 PROCEDURE — 85027 COMPLETE CBC AUTOMATED: CPT

## 2022-01-19 PROCEDURE — 84443 ASSAY THYROID STIM HORMONE: CPT

## 2022-01-20 LAB
ALBUMIN SERPL-MCNC: 2.9 G/DL (ref 3.5–5.2)
ALBUMIN/GLOBULIN RATIO: 1 (ref 1–2.5)
ALP BLD-CCNC: 99 U/L (ref 40–129)
ALT SERPL-CCNC: 20 U/L (ref 5–41)
ANION GAP SERPL CALCULATED.3IONS-SCNC: 9 MMOL/L (ref 9–17)
AST SERPL-CCNC: 27 U/L
BILIRUB SERPL-MCNC: 0.78 MG/DL (ref 0.3–1.2)
BUN BLDV-MCNC: 22 MG/DL (ref 8–23)
BUN/CREAT BLD: ABNORMAL (ref 9–20)
CALCIUM SERPL-MCNC: 9 MG/DL (ref 8.6–10.4)
CHLORIDE BLD-SCNC: 106 MMOL/L (ref 98–107)
CO2: 26 MMOL/L (ref 20–31)
CREAT SERPL-MCNC: 1.03 MG/DL (ref 0.7–1.2)
GFR AFRICAN AMERICAN: >60 ML/MIN
GFR NON-AFRICAN AMERICAN: >60 ML/MIN
GFR SERPL CREATININE-BSD FRML MDRD: ABNORMAL ML/MIN/{1.73_M2}
GFR SERPL CREATININE-BSD FRML MDRD: ABNORMAL ML/MIN/{1.73_M2}
GLUCOSE BLD-MCNC: 80 MG/DL (ref 70–99)
MAGNESIUM: 2 MG/DL (ref 1.6–2.6)
POTASSIUM SERPL-SCNC: 4 MMOL/L (ref 3.7–5.3)
SODIUM BLD-SCNC: 141 MMOL/L (ref 135–144)
TOTAL PROTEIN: 5.9 G/DL (ref 6.4–8.3)
TSH SERPL DL<=0.05 MIU/L-ACNC: 12.97 MIU/L (ref 0.3–5)
VITAMIN D 25-HYDROXY: 56.4 NG/ML (ref 30–100)

## 2022-01-24 ENCOUNTER — APPOINTMENT (OUTPATIENT)
Dept: GENERAL RADIOLOGY | Age: 87
DRG: 438 | End: 2022-01-24
Payer: MEDICARE

## 2022-01-24 ENCOUNTER — APPOINTMENT (OUTPATIENT)
Dept: CT IMAGING | Age: 87
DRG: 438 | End: 2022-01-24
Payer: MEDICARE

## 2022-01-24 ENCOUNTER — HOSPITAL ENCOUNTER (INPATIENT)
Age: 87
LOS: 4 days | Discharge: NURSING, REHAB OR LONG-TERM CARE | DRG: 438 | End: 2022-01-28
Attending: EMERGENCY MEDICINE | Admitting: INTERNAL MEDICINE
Payer: MEDICARE

## 2022-01-24 DIAGNOSIS — K81.0 ACUTE CHOLECYSTITIS: Primary | ICD-10-CM

## 2022-01-24 DIAGNOSIS — E86.0 DEHYDRATION: ICD-10-CM

## 2022-01-24 DIAGNOSIS — R11.2 NAUSEA AND VOMITING, INTRACTABILITY OF VOMITING NOT SPECIFIED, UNSPECIFIED VOMITING TYPE: ICD-10-CM

## 2022-01-24 PROBLEM — K81.9 CHOLECYSTITIS: Status: ACTIVE | Noted: 2022-01-24

## 2022-01-24 LAB
-: NORMAL
ABSOLUTE EOS #: 0 K/UL (ref 0–0.4)
ABSOLUTE IMMATURE GRANULOCYTE: ABNORMAL K/UL (ref 0–0.3)
ABSOLUTE LYMPH #: 0.16 K/UL (ref 1–4.8)
ABSOLUTE MONO #: 0 K/UL (ref 0.1–0.8)
ALBUMIN SERPL-MCNC: 2.7 G/DL (ref 3.5–5.2)
ALBUMIN/GLOBULIN RATIO: 0.7 (ref 1–2.5)
ALP BLD-CCNC: 825 U/L (ref 40–129)
ALT SERPL-CCNC: 352 U/L (ref 5–41)
ANION GAP SERPL CALCULATED.3IONS-SCNC: 10 MMOL/L (ref 9–17)
AST SERPL-CCNC: 722 U/L
BASOPHILS # BLD: 0 % (ref 0–2)
BASOPHILS ABSOLUTE: 0 K/UL (ref 0–0.2)
BILIRUB SERPL-MCNC: 2.63 MG/DL (ref 0.3–1.2)
BUN BLDV-MCNC: 20 MG/DL (ref 8–23)
BUN/CREAT BLD: ABNORMAL (ref 9–20)
CALCIUM SERPL-MCNC: 9.1 MG/DL (ref 8.6–10.4)
CHLORIDE BLD-SCNC: 100 MMOL/L (ref 98–107)
CO2: 26 MMOL/L (ref 20–31)
CREAT SERPL-MCNC: 0.99 MG/DL (ref 0.7–1.2)
DIFFERENTIAL TYPE: ABNORMAL
EOSINOPHILS RELATIVE PERCENT: 0 % (ref 1–4)
GFR AFRICAN AMERICAN: >60 ML/MIN
GFR NON-AFRICAN AMERICAN: >60 ML/MIN
GFR SERPL CREATININE-BSD FRML MDRD: ABNORMAL ML/MIN/{1.73_M2}
GFR SERPL CREATININE-BSD FRML MDRD: ABNORMAL ML/MIN/{1.73_M2}
GLUCOSE BLD-MCNC: 66 MG/DL (ref 70–99)
GLUCOSE BLD-MCNC: 79 MG/DL (ref 75–110)
HCT VFR BLD CALC: 38 % (ref 41–53)
HEMOGLOBIN: 12.6 G/DL (ref 13.5–17.5)
IMMATURE GRANULOCYTES: ABNORMAL %
INR BLD: 1.2
LACTIC ACID, SEPSIS WHOLE BLOOD: ABNORMAL MMOL/L (ref 0.5–1.9)
LACTIC ACID, SEPSIS WHOLE BLOOD: ABNORMAL MMOL/L (ref 0.5–1.9)
LACTIC ACID, SEPSIS: 2.9 MMOL/L (ref 0.5–1.9)
LACTIC ACID, SEPSIS: 3.7 MMOL/L (ref 0.5–1.9)
LIPASE: 1277 U/L (ref 13–60)
LYMPHOCYTES # BLD: 2 % (ref 24–44)
MCH RBC QN AUTO: 30 PG (ref 26–34)
MCHC RBC AUTO-ENTMCNC: 33.2 G/DL (ref 31–37)
MCV RBC AUTO: 90.2 FL (ref 80–100)
MONOCYTES # BLD: 0 % (ref 1–7)
MORPHOLOGY: ABNORMAL
MORPHOLOGY: ABNORMAL
NRBC AUTOMATED: ABNORMAL PER 100 WBC
PARTIAL THROMBOPLASTIN TIME: 29.9 SEC (ref 21.3–31.3)
PDW BLD-RTO: 20.6 % (ref 12.5–15.4)
PLATELET # BLD: 225 K/UL (ref 140–450)
PLATELET ESTIMATE: ABNORMAL
PMV BLD AUTO: 7.8 FL (ref 6–12)
POTASSIUM SERPL-SCNC: 4.5 MMOL/L (ref 3.7–5.3)
PROTHROMBIN TIME: 11.8 SEC (ref 9.4–12.6)
RBC # BLD: 4.21 M/UL (ref 4.5–5.9)
RBC # BLD: ABNORMAL 10*6/UL
REASON FOR REJECTION: NORMAL
SARS-COV-2, RAPID: NOT DETECTED
SEG NEUTROPHILS: 98 % (ref 36–66)
SEGMENTED NEUTROPHILS ABSOLUTE COUNT: 7.84 K/UL (ref 1.8–7.7)
SODIUM BLD-SCNC: 136 MMOL/L (ref 135–144)
SPECIMEN DESCRIPTION: NORMAL
TOTAL PROTEIN: 6.5 G/DL (ref 6.4–8.3)
TROPONIN INTERP: ABNORMAL
TROPONIN T: ABNORMAL NG/ML
TROPONIN, HIGH SENSITIVITY: 42 NG/L (ref 0–22)
WBC # BLD: 8 K/UL (ref 3.5–11)
WBC # BLD: ABNORMAL 10*3/UL
ZZ NTE CLEAN UP: ORDERED TEST: NORMAL
ZZ NTE WITH NAME CLEAN UP: SPECIMEN SOURCE: NORMAL

## 2022-01-24 PROCEDURE — 2580000003 HC RX 258: Performed by: NURSE PRACTITIONER

## 2022-01-24 PROCEDURE — 87040 BLOOD CULTURE FOR BACTERIA: CPT

## 2022-01-24 PROCEDURE — 84484 ASSAY OF TROPONIN QUANT: CPT

## 2022-01-24 PROCEDURE — 71045 X-RAY EXAM CHEST 1 VIEW: CPT

## 2022-01-24 PROCEDURE — 85730 THROMBOPLASTIN TIME PARTIAL: CPT

## 2022-01-24 PROCEDURE — 87635 SARS-COV-2 COVID-19 AMP PRB: CPT

## 2022-01-24 PROCEDURE — 6360000004 HC RX CONTRAST MEDICATION: Performed by: EMERGENCY MEDICINE

## 2022-01-24 PROCEDURE — 6360000002 HC RX W HCPCS: Performed by: NURSE PRACTITIONER

## 2022-01-24 PROCEDURE — 2580000003 HC RX 258: Performed by: EMERGENCY MEDICINE

## 2022-01-24 PROCEDURE — 36415 COLL VENOUS BLD VENIPUNCTURE: CPT

## 2022-01-24 PROCEDURE — 82947 ASSAY GLUCOSE BLOOD QUANT: CPT

## 2022-01-24 PROCEDURE — 85025 COMPLETE CBC W/AUTO DIFF WBC: CPT

## 2022-01-24 PROCEDURE — 99284 EMERGENCY DEPT VISIT MOD MDM: CPT

## 2022-01-24 PROCEDURE — 93005 ELECTROCARDIOGRAM TRACING: CPT | Performed by: EMERGENCY MEDICINE

## 2022-01-24 PROCEDURE — 96361 HYDRATE IV INFUSION ADD-ON: CPT

## 2022-01-24 PROCEDURE — 83605 ASSAY OF LACTIC ACID: CPT

## 2022-01-24 PROCEDURE — 85610 PROTHROMBIN TIME: CPT

## 2022-01-24 PROCEDURE — 74177 CT ABD & PELVIS W/CONTRAST: CPT

## 2022-01-24 PROCEDURE — 2060000000 HC ICU INTERMEDIATE R&B

## 2022-01-24 PROCEDURE — 80053 COMPREHEN METABOLIC PANEL: CPT

## 2022-01-24 PROCEDURE — 6360000002 HC RX W HCPCS: Performed by: EMERGENCY MEDICINE

## 2022-01-24 PROCEDURE — 96375 TX/PRO/DX INJ NEW DRUG ADDON: CPT

## 2022-01-24 PROCEDURE — 96365 THER/PROPH/DIAG IV INF INIT: CPT

## 2022-01-24 PROCEDURE — 72125 CT NECK SPINE W/O DYE: CPT

## 2022-01-24 PROCEDURE — 83690 ASSAY OF LIPASE: CPT

## 2022-01-24 PROCEDURE — 70450 CT HEAD/BRAIN W/O DYE: CPT

## 2022-01-24 RX ORDER — SODIUM CHLORIDE 0.9 % (FLUSH) 0.9 %
5-40 SYRINGE (ML) INJECTION EVERY 12 HOURS SCHEDULED
Status: DISCONTINUED | OUTPATIENT
Start: 2022-01-24 | End: 2022-01-28 | Stop reason: HOSPADM

## 2022-01-24 RX ORDER — MAGNESIUM SULFATE 1 G/100ML
1000 INJECTION INTRAVENOUS PRN
Status: DISCONTINUED | OUTPATIENT
Start: 2022-01-24 | End: 2022-01-28 | Stop reason: HOSPADM

## 2022-01-24 RX ORDER — HYDROCODONE BITARTRATE AND ACETAMINOPHEN 5; 325 MG/1; MG/1
1 TABLET ORAL EVERY 4 HOURS PRN
Status: DISCONTINUED | OUTPATIENT
Start: 2022-01-24 | End: 2022-01-28 | Stop reason: HOSPADM

## 2022-01-24 RX ORDER — VANCOMYCIN HYDROCHLORIDE 125 MG/1
125 CAPSULE ORAL 4 TIMES DAILY
Status: ON HOLD | COMMUNITY
Start: 2022-01-18 | End: 2022-01-28 | Stop reason: SDUPTHER

## 2022-01-24 RX ORDER — BUPROPION HYDROCHLORIDE 150 MG/1
150 TABLET, EXTENDED RELEASE ORAL 2 TIMES DAILY
Status: DISCONTINUED | OUTPATIENT
Start: 2022-01-24 | End: 2022-01-28 | Stop reason: HOSPADM

## 2022-01-24 RX ORDER — 0.9 % SODIUM CHLORIDE 0.9 %
1000 INTRAVENOUS SOLUTION INTRAVENOUS ONCE
Status: COMPLETED | OUTPATIENT
Start: 2022-01-24 | End: 2022-01-24

## 2022-01-24 RX ORDER — MORPHINE SULFATE 2 MG/ML
2 INJECTION, SOLUTION INTRAMUSCULAR; INTRAVENOUS
Status: DISCONTINUED | OUTPATIENT
Start: 2022-01-24 | End: 2022-01-28 | Stop reason: HOSPADM

## 2022-01-24 RX ORDER — ONDANSETRON 4 MG/1
4 TABLET, ORALLY DISINTEGRATING ORAL EVERY 8 HOURS PRN
Status: DISCONTINUED | OUTPATIENT
Start: 2022-01-24 | End: 2022-01-28 | Stop reason: HOSPADM

## 2022-01-24 RX ORDER — ASPIRIN 81 MG/1
81 TABLET ORAL DAILY
Status: DISCONTINUED | OUTPATIENT
Start: 2022-01-25 | End: 2022-01-28 | Stop reason: HOSPADM

## 2022-01-24 RX ORDER — ONDANSETRON 2 MG/ML
4 INJECTION INTRAMUSCULAR; INTRAVENOUS EVERY 6 HOURS PRN
Status: DISCONTINUED | OUTPATIENT
Start: 2022-01-24 | End: 2022-01-28 | Stop reason: HOSPADM

## 2022-01-24 RX ORDER — LEVOTHYROXINE SODIUM 0.1 MG/1
TABLET ORAL
Status: ON HOLD | COMMUNITY
Start: 2021-11-16 | End: 2022-01-28 | Stop reason: SDUPTHER

## 2022-01-24 RX ORDER — VANCOMYCIN HYDROCHLORIDE 125 MG/1
125 CAPSULE ORAL 4 TIMES DAILY
Status: DISCONTINUED | OUTPATIENT
Start: 2022-01-24 | End: 2022-01-28 | Stop reason: HOSPADM

## 2022-01-24 RX ORDER — 0.9 % SODIUM CHLORIDE 0.9 %
80 INTRAVENOUS SOLUTION INTRAVENOUS ONCE
Status: COMPLETED | OUTPATIENT
Start: 2022-01-24 | End: 2022-01-24

## 2022-01-24 RX ORDER — SODIUM CHLORIDE 9 MG/ML
25 INJECTION, SOLUTION INTRAVENOUS PRN
Status: DISCONTINUED | OUTPATIENT
Start: 2022-01-24 | End: 2022-01-28 | Stop reason: HOSPADM

## 2022-01-24 RX ORDER — ATORVASTATIN CALCIUM 40 MG/1
80 TABLET, FILM COATED ORAL DAILY
Status: DISCONTINUED | OUTPATIENT
Start: 2022-01-25 | End: 2022-01-28 | Stop reason: HOSPADM

## 2022-01-24 RX ORDER — FLUOXETINE HYDROCHLORIDE 40 MG/1
CAPSULE ORAL
Status: ON HOLD | COMMUNITY
Start: 2021-11-16 | End: 2022-01-28 | Stop reason: SDUPTHER

## 2022-01-24 RX ORDER — ACETAMINOPHEN 325 MG/1
650 TABLET ORAL EVERY 4 HOURS PRN
Status: DISCONTINUED | OUTPATIENT
Start: 2022-01-24 | End: 2022-01-28 | Stop reason: HOSPADM

## 2022-01-24 RX ORDER — DEXTROSE AND SODIUM CHLORIDE 5; .9 G/100ML; G/100ML
INJECTION, SOLUTION INTRAVENOUS CONTINUOUS
Status: DISCONTINUED | OUTPATIENT
Start: 2022-01-24 | End: 2022-01-28

## 2022-01-24 RX ORDER — POTASSIUM CHLORIDE 20 MEQ/1
20 TABLET, EXTENDED RELEASE ORAL
Status: DISCONTINUED | OUTPATIENT
Start: 2022-01-25 | End: 2022-01-28

## 2022-01-24 RX ORDER — SODIUM CHLORIDE 0.9 % (FLUSH) 0.9 %
10 SYRINGE (ML) INJECTION PRN
Status: DISCONTINUED | OUTPATIENT
Start: 2022-01-24 | End: 2022-01-28 | Stop reason: HOSPADM

## 2022-01-24 RX ORDER — ONDANSETRON 2 MG/ML
4 INJECTION INTRAMUSCULAR; INTRAVENOUS ONCE
Status: COMPLETED | OUTPATIENT
Start: 2022-01-24 | End: 2022-01-24

## 2022-01-24 RX ORDER — TRAMADOL HYDROCHLORIDE 50 MG/1
TABLET ORAL
Status: ON HOLD | COMMUNITY
Start: 2021-11-16 | End: 2022-01-28 | Stop reason: HOSPADM

## 2022-01-24 RX ORDER — POTASSIUM CHLORIDE 7.45 MG/ML
10 INJECTION INTRAVENOUS PRN
Status: DISCONTINUED | OUTPATIENT
Start: 2022-01-24 | End: 2022-01-28 | Stop reason: HOSPADM

## 2022-01-24 RX ORDER — LISINOPRIL 20 MG/1
40 TABLET ORAL DAILY
Status: DISCONTINUED | OUTPATIENT
Start: 2022-01-25 | End: 2022-01-28 | Stop reason: HOSPADM

## 2022-01-24 RX ORDER — FLUOXETINE HYDROCHLORIDE 20 MG/1
40 CAPSULE ORAL DAILY
Status: DISCONTINUED | OUTPATIENT
Start: 2022-01-25 | End: 2022-01-28 | Stop reason: HOSPADM

## 2022-01-24 RX ORDER — MAGNESIUM OXIDE 400 MG/1
400 TABLET ORAL DAILY
COMMUNITY
Start: 2021-09-06

## 2022-01-24 RX ORDER — DEXTROSE MONOHYDRATE 100 MG/ML
INJECTION, SOLUTION INTRAVENOUS CONTINUOUS
Status: DISCONTINUED | OUTPATIENT
Start: 2022-01-24 | End: 2022-01-24

## 2022-01-24 RX ORDER — ONDANSETRON 4 MG/1
4 TABLET, ORALLY DISINTEGRATING ORAL EVERY 8 HOURS PRN
COMMUNITY
Start: 2021-09-14

## 2022-01-24 RX ORDER — FUROSEMIDE 20 MG/1
20 TABLET ORAL DAILY
Status: DISCONTINUED | OUTPATIENT
Start: 2022-01-25 | End: 2022-01-28 | Stop reason: HOSPADM

## 2022-01-24 RX ORDER — PANTOPRAZOLE SODIUM 40 MG/1
40 TABLET, DELAYED RELEASE ORAL
Status: DISCONTINUED | OUTPATIENT
Start: 2022-01-25 | End: 2022-01-28 | Stop reason: HOSPADM

## 2022-01-24 RX ORDER — HYDROCODONE BITARTRATE AND ACETAMINOPHEN 5; 325 MG/1; MG/1
2 TABLET ORAL EVERY 4 HOURS PRN
Status: DISCONTINUED | OUTPATIENT
Start: 2022-01-24 | End: 2022-01-28 | Stop reason: HOSPADM

## 2022-01-24 RX ORDER — OMEPRAZOLE 20 MG/1
CAPSULE, DELAYED RELEASE ORAL
Status: ON HOLD | COMMUNITY
Start: 2021-11-16 | End: 2022-01-28 | Stop reason: SDUPTHER

## 2022-01-24 RX ORDER — POTASSIUM CHLORIDE 20 MEQ/1
TABLET, EXTENDED RELEASE ORAL
Status: ON HOLD | COMMUNITY
Start: 2021-11-16 | End: 2022-01-28 | Stop reason: SDUPTHER

## 2022-01-24 RX ORDER — METOPROLOL SUCCINATE 50 MG/1
50 TABLET, EXTENDED RELEASE ORAL DAILY
Status: DISCONTINUED | OUTPATIENT
Start: 2022-01-25 | End: 2022-01-28 | Stop reason: HOSPADM

## 2022-01-24 RX ORDER — SODIUM CHLORIDE 0.9 % (FLUSH) 0.9 %
5-40 SYRINGE (ML) INJECTION PRN
Status: DISCONTINUED | OUTPATIENT
Start: 2022-01-24 | End: 2022-01-28 | Stop reason: HOSPADM

## 2022-01-24 RX ORDER — DEXTROSE MONOHYDRATE 25 G/50ML
25 INJECTION, SOLUTION INTRAVENOUS ONCE
Status: DISCONTINUED | OUTPATIENT
Start: 2022-01-24 | End: 2022-01-24

## 2022-01-24 RX ORDER — MORPHINE SULFATE 4 MG/ML
4 INJECTION, SOLUTION INTRAMUSCULAR; INTRAVENOUS
Status: DISCONTINUED | OUTPATIENT
Start: 2022-01-24 | End: 2022-01-28 | Stop reason: HOSPADM

## 2022-01-24 RX ORDER — LEVOTHYROXINE SODIUM 0.05 MG/1
100 TABLET ORAL DAILY
Status: DISCONTINUED | OUTPATIENT
Start: 2022-01-25 | End: 2022-01-28 | Stop reason: HOSPADM

## 2022-01-24 RX ORDER — LISINOPRIL 40 MG/1
TABLET ORAL
Status: ON HOLD | COMMUNITY
Start: 2021-11-16 | End: 2022-01-28 | Stop reason: SDUPTHER

## 2022-01-24 RX ORDER — METOPROLOL SUCCINATE 50 MG/1
TABLET, EXTENDED RELEASE ORAL
Status: ON HOLD | COMMUNITY
Start: 2021-11-16 | End: 2022-01-28 | Stop reason: SDUPTHER

## 2022-01-24 RX ORDER — ASPIRIN 81 MG/1
TABLET, COATED ORAL
Status: ON HOLD | COMMUNITY
Start: 2021-12-27 | End: 2022-01-28 | Stop reason: SDUPTHER

## 2022-01-24 RX ADMIN — DEXTROSE AND SODIUM CHLORIDE: 5; 900 INJECTION, SOLUTION INTRAVENOUS at 22:54

## 2022-01-24 RX ADMIN — SODIUM CHLORIDE, PRESERVATIVE FREE 10 ML: 5 INJECTION INTRAVENOUS at 22:55

## 2022-01-24 RX ADMIN — PIPERACILLIN AND TAZOBACTAM 4500 MG: 4; .5 INJECTION, POWDER, FOR SOLUTION INTRAVENOUS at 19:18

## 2022-01-24 RX ADMIN — DEXTROSE MONOHYDRATE: 100 INJECTION, SOLUTION INTRAVENOUS at 18:16

## 2022-01-24 RX ADMIN — SODIUM CHLORIDE 80 ML: 9 INJECTION, SOLUTION INTRAVENOUS at 17:42

## 2022-01-24 RX ADMIN — SODIUM CHLORIDE 1000 ML: 9 INJECTION, SOLUTION INTRAVENOUS at 19:18

## 2022-01-24 RX ADMIN — ONDANSETRON 4 MG: 2 INJECTION INTRAMUSCULAR; INTRAVENOUS at 18:01

## 2022-01-24 RX ADMIN — MORPHINE SULFATE 2 MG: 2 INJECTION, SOLUTION INTRAMUSCULAR; INTRAVENOUS at 22:54

## 2022-01-24 RX ADMIN — SODIUM CHLORIDE, PRESERVATIVE FREE 10 ML: 5 INJECTION INTRAVENOUS at 17:46

## 2022-01-24 RX ADMIN — IOPAMIDOL 75 ML: 755 INJECTION, SOLUTION INTRAVENOUS at 17:46

## 2022-01-24 RX ADMIN — SODIUM CHLORIDE 1000 ML: 9 INJECTION, SOLUTION INTRAVENOUS at 18:10

## 2022-01-24 ASSESSMENT — PAIN SCALES - GENERAL: PAINLEVEL_OUTOF10: 5

## 2022-01-24 NOTE — ED PROVIDER NOTES
[address] [facility]  [address] [address] [facility] [address] [facility] [address]  Phone: [phone]  Fax: [phone]        Pt Name: Eri Xiong  MRN: [id number]  [place] 1935  Date of evaluation: 1/24/22      CHIEF COMPLAINT     Chief Complaint   Patient presents with    Emesis     cdiff diagnosis 2 weeks ago, started vanco 5 days ago, started vomiting x2 days, worried about dehydration         HISTORY OF PRESENT ILLNESS  (Location/Symptom, Timing/Onset, Context/Setting, Quality, Duration, Modifying Factors, Severity.)    Eri Xiong is a 82 y.o. male who presents with nausea and vomiting. The patient is currently being treated for c diff colitis with oral vancomycin. The patient resides at the [facility]. He lives alone. He is still having fever, according to the son who is at bedside. REVIEW OF SYSTEMS    (2-9 systems for level 4, 10 or more for level 5)     Review of Systems   Unable to perform ROS: Mental status change       PAST MEDICAL HISTORY    has a past medical history of A-fib ([address] CVA (cerebral vascular accident) ([facility] [address] and Myocardial infarct, old. SURGICAL HISTORY      has a past surgical history that includes Cardioversion (N/A, 9/24/2020).     CURRENTMEDICATIONS       Previous Medications    APIXABAN (ELIQUIS) 5 MG TABS TABLET    Take 1 tablet by mouth 2 times daily    ASPIRIN LOW DOSE 81 MG EC TABLET        ATORVASTATIN (LIPITOR) 80 MG TABLET    Take 80 mg by mouth daily    BUPROPION (WELLBUTRIN SR) 150 MG EXTENDED RELEASE TABLET    Take 150 mg by mouth 2 times daily    FLUOXETINE (PROZAC) 40 MG CAPSULE        FUROSEMIDE (LASIX) 20 MG TABLET    Take 1 tablet by mouth daily    LEVOTHYROXINE (SYNTHROID) 100 MCG TABLET        LISINOPRIL (PRINIVIL;ZESTRIL) 40 MG TABLET        MAGNESIUM OXIDE (MAG-OX) 400 MG TABLET    Take 400 mg by mouth daily    METOPROLOL SUCCINATE (TOPROL XL) 50 MG EXTENDED RELEASE TABLET        OMEPRAZOLE (PRILOSEC) 20 MG DELAYED RELEASE CAPSULE        ONDANSETRON (ZOFRAN-ODT) 4 MG DISINTEGRATING TABLET    Take 4 mg by mouth every 8 hours as needed    POTASSIUM CHLORIDE (KLOR-CON M) 20 MEQ EXTENDED RELEASE TABLET        TRAMADOL (ULTRAM) 50 MG TABLET        VANCOMYCIN (VANCOCIN) 125 MG CAPSULE    Take 125 mg by mouth 4 times daily       ALLERGIES     has No Known Allergies. FAMILY HISTORY     has no family status information on file. family history is not on file. SOCIAL HISTORY      reports that he has never smoked. He has never used smokeless tobacco.    PHYSICAL EXAM    (up to 7 for level 4, 8 or more for level 5)   INITIAL VITALS:  height is 6' 6\" (1.981 m). His temporal temperature is 99.7 °F (37.6 °C). His blood pressure is 102/61 and his pulse is 76. His respiration is 16 and oxygen saturation is 100%. Physical Exam  Vitals and nursing note reviewed. Constitutional:       Appearance: Normal appearance. He is ill-appearing. HENT:      Head: Normocephalic and atraumatic. Mouth/Throat:      Mouth: Mucous membranes are dry. Eyes:      Extraocular Movements: Extraocular movements intact. Pupils: Pupils are equal, round, and reactive to light. Pulmonary:      Effort: Pulmonary effort is normal.      Breath sounds: Normal breath sounds. No wheezing, rhonchi or rales. Abdominal:      General: Abdomen is flat. Bowel sounds are normal. There is no distension. Palpations: Abdomen is soft. Tenderness: There is no abdominal tenderness. There is no guarding or rebound. Musculoskeletal:         General: No tenderness. Normal range of motion. Cervical back: Normal range of motion and neck supple. Right lower leg: No edema. Left lower leg: No edema. Skin:     General: Skin is warm and dry. Capillary Refill: Capillary refill takes less than 2 seconds. Neurological:      Mental Status: He is alert. DIFFERENTIAL DIAGNOSIS/ MDM:     Possible acute cholecystitis.  Plan for admission. DIAGNOSTIC RESULTS     EKG: All EKG's are interpreted by the Emergency Department Physician who either signs or Co-signs this chart in the absence of a cardiologist.    EKG Interpretation    Interpreted by emergency department physician    Rhythm: normal sinus   Rate: normal  Axis: left  Ectopy: none  Conduction: 1st degree AV block  ST Segments: no acute change  T Waves: no acute change  Q Waves: III    Clinical Impression: non-specific EKG    Alexis Newamn MD      RADIOLOGY:        Interpretation per the Radiologist below, if available at the time of this note:    CT HEAD WO CONTRAST    Result Date: 1/24/2022  EXAMINATION: CT OF THE HEAD WITHOUT CONTRAST  1/24/2022 5:12 pm TECHNIQUE: CT of the head was performed without the administration of intravenous contrast. Dose modulation, iterative reconstruction, and/or weight based adjustment of the mA/kV was utilized to reduce the radiation dose to as low as reasonably achievable. COMPARISON: 22 October 2020 HISTORY: ORDERING SYSTEM PROVIDED HISTORY: possible fall, altered mental status TECHNOLOGIST PROVIDED HISTORY: possible fall, altered mental status Reason for Exam: possible fall, altered mental status FINDINGS: BRAIN/VENTRICLES: There is no acute intracranial hemorrhage, mass effect or midline shift. No abnormal extra-axial fluid collection. The gray-white differentiation is maintained without evidence of an acute infarct. There is no evidence of hydrocephalus. Scattered white matter hypodensity is present consistent with chronic microvascular change, extensive and worsened since prior study. Small remote lacunar infarcts are present. Calcification of the vertebral and cavernous carotid arteries is noted. Diffuse mild cortical atrophy is evident. ORBITS: The visualized portion of the orbits demonstrate no acute abnormality. SINUSES: The visualized paranasal sinuses and mastoid air cells demonstrate no acute abnormality.  SOFT TISSUES/SKULL:  No acute abnormality of the visualized skull or soft tissues. No acute intracranial abnormality. Senescent changes including chronic microvascular change with advancement since prior exam.     CT CERVICAL SPINE WO CONTRAST    Result Date: 1/24/2022  EXAMINATION: CT OF THE CERVICAL SPINE WITHOUT CONTRAST 1/24/2022 2:12 pm TECHNIQUE: CT of the cervical spine was performed without the administration of intravenous contrast. Multiplanar reformatted images are provided for review. Dose modulation, iterative reconstruction, and/or weight based adjustment of the mA/kV was utilized to reduce the radiation dose to as low as reasonably achievable. COMPARISON: Concurrent head CT, CT C-spine 10/22/2020 HISTORY: ORDERING SYSTEM PROVIDED HISTORY: fall TECHNOLOGIST PROVIDED HISTORY: fall Decision Support Exception - unselect if not a suspected or confirmed emergency medical condition->Emergency Medical Condition (MA) Reason for Exam: possible fall, altered mental status FINDINGS: BONES/ALIGNMENT: There is no acute fracture or traumatic subluxation. There is exaggerated thoracic kyphosis with resultant forward head tilt. Unchanged trace degenerative retrolisthesis at C3 through C6 and trace grade 1 degenerative anterolisthesis at T1 on T2. DEGENERATIVE CHANGES: Advanced disc, osseous, and facet degenerative changes are present throughout the cervical spine with moderate to severe spinal canal stenosis at C5-C6 and moderate narrowing at C3 through C5. Moderate to severe osseous neuroforaminal stenosis at all levels with the exception of sparing at C2-C3 on the right. SOFT TISSUES: There is no prevertebral soft tissue swelling. Punctate focus of intravenous gas at the base of the neck on the right related to peripheral IV. Visualized portion of the lung apices are clear. No acute bony abnormality of the cervical spine on a background of advanced degenerative changes.      CT ABDOMEN PELVIS W IV CONTRAST Additional Contrast? None    Result Date: 1/24/2022  EXAMINATION: CT OF THE ABDOMEN AND PELVIS WITH CONTRAST 1/24/2022 5:12 pm TECHNIQUE: CT of the abdomen and pelvis was performed with the administration of intravenous contrast. Multiplanar reformatted images are provided for review. Dose modulation, iterative reconstruction, and/or weight based adjustment of the mA/kV was utilized to reduce the radiation dose to as low as reasonably achievable. COMPARISON: Chest CTA of 04/15/2020 HISTORY: ORDERING SYSTEM PROVIDED HISTORY: abdominal pain, vomiting TECHNOLOGIST PROVIDED HISTORY: abdominal pain, vomiting Decision Support Exception - unselect if not a suspected or confirmed emergency medical condition->Emergency Medical Condition (MA) Reason for Exam:  currently being treated for c diff colitis abdominal pain vomiting x 2 days FINDINGS: LOWER CHEST:  Visualized portion of the lower chest demonstrates no acute abnormality. KIDNEYS AND URINARY TRACT: No renal calculi are identified. There is no evidence for hydronephrosis. The ureters are of normal course and caliber. ORGANS: The gallbladder is significantly distended and there is evidence gallbladder wall thickening. The findings can be suggestive of acute cholecystitis. Interval development of mild intra and extrahepatic biliary dilatation. Unchanged simple cysts within the left lobe of the liver. Apparent stranding surrounding the pancreas. The finding is likely artifactual related to extensive motion on the acquired images. Visualized portions of the liver, spleen, pancreas,  and adrenal glands demonstrate no other abnormality. GI/BOWEL: No bowel obstruction. No evidence of acute appendicitis. Diverticulosis with no signs of diverticulitis. PELVIS: The bladder and pelvic organs are unremarkable. PERITONEUM/RETROPERITONEUM: No free air or free fluid is noted. No pathologically enlarged lymphadenopathy. Supra iliac infrarenal aortic aneurysm, measuring approximately 2.4 x 3.2 cm. BONES/SOFT TISSUES: The osseous structures demonstrate no acute abnormality. Changes related to left hip arthroplasty. Mild dextroscoliosis with tip at L4-L5. Suboptimal study due to patient motion. Significantly distended gallbladder with gallbladder wall thickening. Findings are concerning for acute cholecystitis. For further evaluation right upper quadrant ultrasound is recommended. Interval development of mild intra and extrahepatic biliary dilatation. Apparent stranding within the upper abdomen predominantly surrounding the pancreas. Finding is likely artifactual related to extensive patient motion. However correlation with the patient lab profile is recommended to exclude pancreatitis. No discrete evidence of colitis is noted on the acquired images. Supra iliac infrarenal aortic aneurysm, measuring approximately 2.4 x 3.2 cm. 3 year follow-up is recommended. XR CHEST PORTABLE    Result Date: 1/24/2022  EXAMINATION: ONE XRAY VIEW OF THE CHEST 1/24/2022 5:49 pm COMPARISON: October 22, 2020 HISTORY: ORDERING SYSTEM PROVIDED HISTORY: chest pain TECHNOLOGIST PROVIDED HISTORY: chest pain Reason for Exam: chest pain FINDINGS: The cardiomediastinal silhouette is stable. The lungs are grossly clear. There is no pleural effusion. There is no pneumothorax. There is no acute osseous abnormality. No acute cardiopulmonary disease. LABS:  Results for orders placed or performed during the hospital encounter of 01/24/22   COVID-19, Rapid    Specimen: Nasopharyngeal Swab   Result Value Ref Range    Specimen Description . NASOPHARYNGEAL SWAB     SARS-CoV-2, Rapid Not Detected Not Detected   CBC Auto Differential   Result Value Ref Range    WBC 8.0 3.5 - 11.0 k/uL    RBC 4.21 (L) 4.5 - 5.9 m/uL    Hemoglobin 12.6 (L) 13.5 - 17.5 g/dL    Hematocrit 38.0 (L) 41 - 53 %    MCV 90.2 80 - 100 fL    MCH 30.0 26 - 34 pg    MCHC 33.2 31 - 37 g/dL    RDW 20.6 (H) 12.5 - 15.4 %    Platelets 691 362 - 431 k/uL    MPV 7.8 6.0 - 12.0 fL    NRBC Automated NOT REPORTED per 100 WBC    Differential Type NOT REPORTED     Immature Granulocytes NOT REPORTED 0 %    Absolute Immature Granulocyte NOT REPORTED 0.00 - 0.30 k/uL    WBC Morphology NOT REPORTED     RBC Morphology NOT REPORTED     Platelet Estimate NOT REPORTED     Seg Neutrophils 98 (H) 36 - 66 %    Lymphocytes 2 (L) 24 - 44 %    Monocytes 0 (L) 1 - 7 %    Eosinophils % 0 (L) 1 - 4 %    Basophils 0 0 - 2 %    Segs Absolute 7.84 (H) 1.8 - 7.7 k/uL    Absolute Lymph # 0.16 (L) 1.0 - 4.8 k/uL    Absolute Mono # 0.00 (L) 0.1 - 0.8 k/uL    Absolute Eos # 0.00 0.0 - 0.4 k/uL    Basophils Absolute 0.00 0.0 - 0.2 k/uL    Morphology ANISOCYTOSIS PRESENT     Morphology 1+ SCHISTOCYTES    Comprehensive Metabolic Panel   Result Value Ref Range    Glucose 66 (L) 70 - 99 mg/dL    BUN 20 8 - 23 mg/dL    CREATININE 0.99 0.70 - 1.20 mg/dL    Bun/Cre Ratio NOT REPORTED 9 - 20    Calcium 9.1 8.6 - 10.4 mg/dL    Sodium 136 135 - 144 mmol/L    Potassium 4.5 3.7 - 5.3 mmol/L    Chloride 100 98 - 107 mmol/L    CO2 26 20 - 31 mmol/L    Anion Gap 10 9 - 17 mmol/L    Alkaline Phosphatase 825 (H) 40 - 129 U/L     (H) 5 - 41 U/L     (H) <40 U/L    Total Bilirubin 2.63 (H) 0.3 - 1.2 mg/dL    Total Protein 6.5 6.4 - 8.3 g/dL    Albumin 2.7 (L) 3.5 - 5.2 g/dL    Albumin/Globulin Ratio 0.7 (L) 1.0 - 2.5    GFR Non-African American >60 >60 mL/min    GFR African American >60 >60 mL/min    GFR Comment          GFR Staging NOT REPORTED    SPECIMEN REJECTION   Result Value Ref Range    Specimen Source . BLOOD     Ordered Test LACDS     Reason for Rejection Unable to perform testing: No specimen received.      - NOT REPORTED    Lactate, Sepsis   Result Value Ref Range    Lactic Acid, Sepsis 3.7 (H) 0.5 - 1.9 mmol/L    Lactic Acid, Sepsis, Whole Blood NOT REPORTED 0.5 - 1.9 mmol/L       No leukocytosis, elevated lactic acid, elevated LFT    EMERGENCY DEPARTMENT COURSE:   Vitals:    Vitals:    01/24/22 [phone] 01/24/22 1701 01/24/22 1715 01/24/22 1817   BP: (!) 94/49 89/61 100/89 102/61   Pulse:       Resp:       Temp:       TempSrc:       SpO2:    100%   Height:         -------------------------  BP: 102/61, Temp: 99.7 °F (37.6 °C), Pulse: 76, Resp: 16          CONSULTS:  General Surgery  I discussed the patient with Dr Yamilex Zamora. He states that the patient may need an ERCP due to his CT results. He is willing to follow the patient. Internal Medicine    PROCEDURES:  None    FINAL IMPRESSION      1. Acute cholecystitis          DISPOSITION/PLAN   DISPOSITION        CONDITION ON DISPOSITION:   Fair     PATIENT REFERRED TO:  No follow-up provider specified.     DISCHARGE MEDICATIONS:  New Prescriptions    No medications on file       (Please note that portions of this note were completed with a voicerecognition program.  Efforts were made to edit the dictations but occasionally words are mis-transcribed.)    Ricky Gresham MD  Attending Emergency Medicine Physician       Ricky Gresham MD  01/28/22 [id number]

## 2022-01-25 ENCOUNTER — APPOINTMENT (OUTPATIENT)
Dept: ULTRASOUND IMAGING | Age: 87
DRG: 438 | End: 2022-01-25
Payer: MEDICARE

## 2022-01-25 PROBLEM — G30.1 LATE ONSET ALZHEIMER'S DEMENTIA WITHOUT BEHAVIORAL DISTURBANCE (HCC): Status: ACTIVE | Noted: 2022-01-25

## 2022-01-25 PROBLEM — I48.0 PAROXYSMAL ATRIAL FIBRILLATION (HCC): Status: ACTIVE | Noted: 2020-04-15

## 2022-01-25 PROBLEM — A04.72 C. DIFFICILE COLITIS: Status: ACTIVE | Noted: 2022-01-25

## 2022-01-25 PROBLEM — E43 SEVERE PROTEIN-CALORIE MALNUTRITION (HCC): Status: ACTIVE | Noted: 2022-01-25

## 2022-01-25 PROBLEM — K81.0 ACUTE CHOLECYSTITIS: Status: ACTIVE | Noted: 2022-01-24

## 2022-01-25 PROBLEM — F02.80 LATE ONSET ALZHEIMER'S DEMENTIA WITHOUT BEHAVIORAL DISTURBANCE (HCC): Status: ACTIVE | Noted: 2022-01-25

## 2022-01-25 PROBLEM — I50.22 CHRONIC SYSTOLIC HEART FAILURE (HCC): Status: ACTIVE | Noted: 2020-04-27

## 2022-01-25 PROBLEM — K85.10 ACUTE BILIARY PANCREATITIS WITHOUT INFECTION OR NECROSIS: Status: ACTIVE | Noted: 2022-01-25

## 2022-01-25 LAB
ALBUMIN SERPL-MCNC: 2.3 G/DL (ref 3.5–5.2)
ALBUMIN/GLOBULIN RATIO: 0.7 (ref 1–2.5)
ALP BLD-CCNC: 664 U/L (ref 40–129)
ALT SERPL-CCNC: 302 U/L (ref 5–41)
AMYLASE: 263 U/L (ref 28–100)
ANION GAP SERPL CALCULATED.3IONS-SCNC: 6 MMOL/L (ref 9–17)
AST SERPL-CCNC: 475 U/L
BILIRUB SERPL-MCNC: 1.67 MG/DL (ref 0.3–1.2)
BUN BLDV-MCNC: 19 MG/DL (ref 8–23)
BUN/CREAT BLD: ABNORMAL (ref 9–20)
CALCIUM IONIZED: 1.22 MMOL/L (ref 1.13–1.33)
CALCIUM SERPL-MCNC: 8.3 MG/DL (ref 8.6–10.4)
CHLORIDE BLD-SCNC: 105 MMOL/L (ref 98–107)
CO2: 26 MMOL/L (ref 20–31)
CREAT SERPL-MCNC: 1.05 MG/DL (ref 0.7–1.2)
EKG ATRIAL RATE: 79 BPM
EKG P AXIS: 72 DEGREES
EKG P-R INTERVAL: 228 MS
EKG Q-T INTERVAL: 430 MS
EKG QRS DURATION: 112 MS
EKG QTC CALCULATION (BAZETT): 493 MS
EKG R AXIS: -24 DEGREES
EKG T AXIS: 110 DEGREES
EKG VENTRICULAR RATE: 79 BPM
GFR AFRICAN AMERICAN: >60 ML/MIN
GFR NON-AFRICAN AMERICAN: >60 ML/MIN
GFR SERPL CREATININE-BSD FRML MDRD: ABNORMAL ML/MIN/{1.73_M2}
GFR SERPL CREATININE-BSD FRML MDRD: ABNORMAL ML/MIN/{1.73_M2}
GLUCOSE BLD-MCNC: 134 MG/DL (ref 70–99)
GLUCOSE BLD-MCNC: 76 MG/DL (ref 75–110)
GLUCOSE BLD-MCNC: 79 MG/DL (ref 75–110)
HCT VFR BLD CALC: 31 % (ref 41–53)
HEMOGLOBIN: 10.3 G/DL (ref 13.5–17.5)
INR BLD: 1.2
LIPASE: 564 U/L (ref 13–60)
MAGNESIUM: 1.9 MG/DL (ref 1.6–2.6)
MCH RBC QN AUTO: 30.4 PG (ref 26–34)
MCHC RBC AUTO-ENTMCNC: 33.2 G/DL (ref 31–37)
MCV RBC AUTO: 91.6 FL (ref 80–100)
NRBC AUTOMATED: ABNORMAL PER 100 WBC
PDW BLD-RTO: 20.4 % (ref 12.5–15.4)
PHOSPHORUS: 2.5 MG/DL (ref 2.5–4.5)
PLATELET # BLD: 191 K/UL (ref 140–450)
PMV BLD AUTO: 7.7 FL (ref 6–12)
POTASSIUM SERPL-SCNC: 4.3 MMOL/L (ref 3.7–5.3)
PROTHROMBIN TIME: 12.1 SEC (ref 9.4–12.6)
RBC # BLD: 3.39 M/UL (ref 4.5–5.9)
SODIUM BLD-SCNC: 137 MMOL/L (ref 135–144)
TOTAL PROTEIN: 5.8 G/DL (ref 6.4–8.3)
TRIGL SERPL-MCNC: 53 MG/DL
WBC # BLD: 7.2 K/UL (ref 3.5–11)

## 2022-01-25 PROCEDURE — 2580000003 HC RX 258: Performed by: INTERNAL MEDICINE

## 2022-01-25 PROCEDURE — 85027 COMPLETE CBC AUTOMATED: CPT

## 2022-01-25 PROCEDURE — 2580000003 HC RX 258: Performed by: NURSE PRACTITIONER

## 2022-01-25 PROCEDURE — 80074 ACUTE HEPATITIS PANEL: CPT

## 2022-01-25 PROCEDURE — 85610 PROTHROMBIN TIME: CPT

## 2022-01-25 PROCEDURE — 99222 1ST HOSP IP/OBS MODERATE 55: CPT | Performed by: SURGERY

## 2022-01-25 PROCEDURE — 82330 ASSAY OF CALCIUM: CPT

## 2022-01-25 PROCEDURE — 99223 1ST HOSP IP/OBS HIGH 75: CPT | Performed by: INTERNAL MEDICINE

## 2022-01-25 PROCEDURE — 76705 ECHO EXAM OF ABDOMEN: CPT

## 2022-01-25 PROCEDURE — 84100 ASSAY OF PHOSPHORUS: CPT

## 2022-01-25 PROCEDURE — 97535 SELF CARE MNGMENT TRAINING: CPT

## 2022-01-25 PROCEDURE — 83735 ASSAY OF MAGNESIUM: CPT

## 2022-01-25 PROCEDURE — 6360000002 HC RX W HCPCS: Performed by: INTERNAL MEDICINE

## 2022-01-25 PROCEDURE — 82150 ASSAY OF AMYLASE: CPT

## 2022-01-25 PROCEDURE — 6370000000 HC RX 637 (ALT 250 FOR IP): Performed by: NURSE PRACTITIONER

## 2022-01-25 PROCEDURE — 83690 ASSAY OF LIPASE: CPT

## 2022-01-25 PROCEDURE — 97530 THERAPEUTIC ACTIVITIES: CPT

## 2022-01-25 PROCEDURE — 2060000000 HC ICU INTERMEDIATE R&B

## 2022-01-25 PROCEDURE — 84478 ASSAY OF TRIGLYCERIDES: CPT

## 2022-01-25 PROCEDURE — 97166 OT EVAL MOD COMPLEX 45 MIN: CPT

## 2022-01-25 PROCEDURE — 36415 COLL VENOUS BLD VENIPUNCTURE: CPT

## 2022-01-25 PROCEDURE — 82947 ASSAY GLUCOSE BLOOD QUANT: CPT

## 2022-01-25 PROCEDURE — 80053 COMPREHEN METABOLIC PANEL: CPT

## 2022-01-25 PROCEDURE — 97162 PT EVAL MOD COMPLEX 30 MIN: CPT

## 2022-01-25 RX ADMIN — FUROSEMIDE 20 MG: 20 TABLET ORAL at 08:01

## 2022-01-25 RX ADMIN — BUPROPION HYDROCHLORIDE 150 MG: 150 TABLET, FILM COATED, EXTENDED RELEASE ORAL at 21:21

## 2022-01-25 RX ADMIN — ATORVASTATIN CALCIUM 80 MG: 40 TABLET, FILM COATED ORAL at 08:00

## 2022-01-25 RX ADMIN — LISINOPRIL 40 MG: 20 TABLET ORAL at 08:01

## 2022-01-25 RX ADMIN — DEXTROSE AND SODIUM CHLORIDE: 5; 900 INJECTION, SOLUTION INTRAVENOUS at 16:04

## 2022-01-25 RX ADMIN — HYDROCODONE BITARTRATE AND ACETAMINOPHEN 2 TABLET: 5; 325 TABLET ORAL at 16:02

## 2022-01-25 RX ADMIN — PANTOPRAZOLE SODIUM 40 MG: 40 TABLET, DELAYED RELEASE ORAL at 08:00

## 2022-01-25 RX ADMIN — PIPERACILLIN AND TAZOBACTAM 3375 MG: 3; .375 INJECTION, POWDER, LYOPHILIZED, FOR SOLUTION INTRAVENOUS at 10:04

## 2022-01-25 RX ADMIN — ASPIRIN 81 MG: 81 TABLET, COATED ORAL at 08:00

## 2022-01-25 RX ADMIN — VANCOMYCIN HYDROCHLORIDE 125 MG: 125 CAPSULE ORAL at 08:01

## 2022-01-25 RX ADMIN — VANCOMYCIN HYDROCHLORIDE 125 MG: 125 CAPSULE ORAL at 21:21

## 2022-01-25 RX ADMIN — METOPROLOL SUCCINATE 50 MG: 50 TABLET, EXTENDED RELEASE ORAL at 08:00

## 2022-01-25 RX ADMIN — PIPERACILLIN AND TAZOBACTAM 3375 MG: 3; .375 INJECTION, POWDER, LYOPHILIZED, FOR SOLUTION INTRAVENOUS at 16:02

## 2022-01-25 RX ADMIN — FLUOXETINE 40 MG: 20 CAPSULE ORAL at 08:00

## 2022-01-25 RX ADMIN — LEVOTHYROXINE SODIUM 100 MCG: 50 TABLET ORAL at 08:00

## 2022-01-25 RX ADMIN — MAGNESIUM OXIDE TAB 400 MG (241.3 MG ELEMENTAL MG) 400 MG: 400 (241.3 MG) TAB at 08:00

## 2022-01-25 RX ADMIN — HYDROCODONE BITARTRATE AND ACETAMINOPHEN 2 TABLET: 5; 325 TABLET ORAL at 07:35

## 2022-01-25 RX ADMIN — BUPROPION HYDROCHLORIDE 150 MG: 150 TABLET, FILM COATED, EXTENDED RELEASE ORAL at 08:00

## 2022-01-25 RX ADMIN — VANCOMYCIN HYDROCHLORIDE 125 MG: 125 CAPSULE ORAL at 13:38

## 2022-01-25 RX ADMIN — SODIUM CHLORIDE, PRESERVATIVE FREE 10 ML: 5 INJECTION INTRAVENOUS at 08:01

## 2022-01-25 RX ADMIN — POTASSIUM CHLORIDE 20 MEQ: 1500 TABLET, EXTENDED RELEASE ORAL at 08:02

## 2022-01-25 RX ADMIN — VANCOMYCIN HYDROCHLORIDE 125 MG: 125 CAPSULE ORAL at 16:02

## 2022-01-25 RX ADMIN — HYDROCODONE BITARTRATE AND ACETAMINOPHEN 2 TABLET: 5; 325 TABLET ORAL at 11:22

## 2022-01-25 RX ADMIN — SODIUM CHLORIDE, PRESERVATIVE FREE 10 ML: 5 INJECTION INTRAVENOUS at 21:21

## 2022-01-25 RX ADMIN — APIXABAN 5 MG: 5 TABLET, FILM COATED ORAL at 08:00

## 2022-01-25 ASSESSMENT — PAIN DESCRIPTION - PROGRESSION
CLINICAL_PROGRESSION: NOT CHANGED

## 2022-01-25 ASSESSMENT — PAIN DESCRIPTION - PAIN TYPE: TYPE: CHRONIC PAIN

## 2022-01-25 ASSESSMENT — PAIN DESCRIPTION - DESCRIPTORS: DESCRIPTORS: ACHING;CONSTANT

## 2022-01-25 ASSESSMENT — PAIN DESCRIPTION - FREQUENCY: FREQUENCY: CONTINUOUS

## 2022-01-25 ASSESSMENT — PAIN DESCRIPTION - ORIENTATION: ORIENTATION: LEFT

## 2022-01-25 ASSESSMENT — PAIN DESCRIPTION - ONSET: ONSET: ON-GOING

## 2022-01-25 ASSESSMENT — PAIN SCALES - GENERAL
PAINLEVEL_OUTOF10: 6
PAINLEVEL_OUTOF10: 9
PAINLEVEL_OUTOF10: 8
PAINLEVEL_OUTOF10: 0
PAINLEVEL_OUTOF10: 0
PAINLEVEL_OUTOF10: 7

## 2022-01-25 ASSESSMENT — PAIN - FUNCTIONAL ASSESSMENT: PAIN_FUNCTIONAL_ASSESSMENT: ACTIVITIES ARE NOT PREVENTED

## 2022-01-25 ASSESSMENT — PAIN DESCRIPTION - LOCATION: LOCATION: HIP

## 2022-01-25 NOTE — H&P
[facility]  Office: [address], , Pete Cox, DO, Blanca Sam, DO, Alon Shun Blood, DO, Zofia Murguia MD, Cezar Weber MD, Finn Cummings MD, Harpreet Donis MD, Dharmesh Payan MD, Hermann Peña MD, Taylor Donaldsno MD, Lola Chambers, DO, Estella Aguilar, DO, Hemalatha oNble MD,  Marti Kan DO, Yulisa Yusuf MD, Amina Joseph MD, Mandi Gomes MD, Dayne Quezada MD, Nori Beckford MD, Daniel Oakley MD, Willard Garcia MD, Crystal Prajapati [facility], CNP, Tyler Green, CNP, Sourav Burroughs, CNS, Lenka Godoy, [facility], Beryle Fix, CNP, Brook Acevedo, CNP, Champ Varghese, CNP, Nitin Petty, CNP, Trenton Whitman PA-C, Tony Monk, [facility], Ike Brown, [facility], Gia Francis, CNP, Chang Troy, CNP, Abdirizak Ragsdale, CNP, Raymond Harris, CNP, Aguila Mireles, CNP, Betsy Hernandez [id number]    HISTORY AND PHYSICAL EXAMINATION            Date:   1/25/2022  Patient name:  Saurav Tinsley  Date of admission:  1/24/2022  4:41 PM  MRN:   [id number]  Account:  [de-identified]  YOB: 1935  PCP:    Zachary Peter MD  Room:   [address] [place] [address]  Code Status:    Full Code    Chief Complaint:     Chief Complaint   Patient presents with    Emesis     cdiff diagnosis 2 weeks ago, started vanco 5 days ago, started vomiting x2 days, worried about dehydration       History Obtained From:     family member - son, electronic medical record, reason patient could not give history:  altered mental status    History of Present Illness:     Saurav Tinsley is a 82 y.o. Non- / non  male who presents with Emesis (cdiff diagnosis 2 weeks ago, started vanco 5 days ago, started vomiting x2 days, worried about dehydration)   and is admitted to the hospital for the management of Acute cholecystitis. This 82 yom has had vomiting and diarrhea off and on since last September-diagnosed and treated for hep A at that time.   Then broke hip and underwent surgery last October-surgery went fairly well but  postop he had significant confusion for about 10 days. He then contracted covid around thanksgiving and was not doing well; received the monoclonal antibody and recovered after that. When he had covid, he was actually placed on hospice. This has since been revoked and he entered snf for rehab. He now started having vomiting again and was recently diagnosed with c dif and is on po vanco at [facility] for this. He has not been eating for some time now and continues to lose weight. He was transferred from [facility] with ongoing vomiting and poor po intake. Past Medical History:     Past Medical History:   Diagnosis Date    A-fib [facility])     CVA (cerebral vascular accident) ([facility] [address]     Myocardial infarct, old         Past Surgical History:     Past Surgical History:   Procedure Laterality Date    CARDIOVERSION N/A 9/24/2020    CARDIOVERSION performed by Glenda Irizarry MD at [address] [facility]        Medications Prior to Admission:     Prior to Admission medications    Medication Sig Start Date End Date Taking?  Authorizing Provider   metoprolol succinate (TOPROL XL) 50 MG extended release tablet  11/16/21  Yes Historical Provider, MD   traMADol (ULTRAM) 50 MG tablet  11/16/21  Yes Historical Provider, MD   vancomycin (VANCOCIN) 125 MG capsule Take 125 mg by mouth 4 times daily 1/18/22 1/28/22 Yes Historical Provider, MD   potassium chloride (KLOR-CON M) 20 MEQ extended release tablet  11/16/21  Yes Historical Provider, MD   ondansetron (ZOFRAN-ODT) 4 MG disintegrating tablet Take 4 mg by mouth every 8 hours as needed 9/14/21  Yes Historical Provider, MD   omeprazole (PRILOSEC) 20 MG delayed release capsule  11/16/21  Yes Historical Provider, MD   magnesium oxide (MAG-OX) 400 MG tablet Take 400 mg by mouth daily 9/6/21  Yes Historical Provider, MD   lisinopril (PRINIVIL;ZESTRIL) 40 MG tablet  11/16/21  Yes Historical Provider, MD   FLUoxetine (PROZAC) 40 MG capsule  21  Yes Historical Provider, MD   levothyroxine (SYNTHROID) 100 MCG tablet  21  Yes Historical Provider, MD   ASPIRIN LOW DOSE 81 MG EC tablet  21  Yes Historical Provider, MD   furosemide (LASIX) 20 MG tablet Take 1 tablet by mouth daily 10/24/20  Yes Kelsy Purvis MD   apixaban (ELIQUIS) 5 MG TABS tablet Take 1 tablet by mouth 2 times daily 20  Yes Barbara Tran MD   atorvastatin (LIPITOR) 80 MG tablet Take 80 mg by mouth daily 20  Yes Historical Provider, MD   buPROPion [facility] SR) 150 MG extended release tablet Take 150 mg by mouth 2 times daily 20  Yes Historical Provider, MD        Allergies:     Patient has no known allergies. Social History:     Tobacco:    reports that he has never smoked. He has never used smokeless tobacco.  Alcohol:      has no history on file for alcohol use. Drug Use:  has no history on file for drug use.     Family History:     Family History   Family history unknown: Yes       Review of Systems:     unobtainable      Physical Exam:   /82   Pulse 69   Temp 97.8 °F (36.6 °C) (Axillary)   Resp 18   Ht 6' 6\" (1.981 m)   Wt 145 lb 8.1 oz (66 kg)   SpO2 93%   BMI 16.81 kg/m²   Temp (24hrs), Av.5 °F (36.9 °C), Min:97.8 °F (36.6 °C), Max:99.7 °F (37.6 °C)    Recent Labs     22  2304 22  0456 22  0803   POCGLU 79 76 79     No intake or output data in the 24 hours ending 22 0815    General Appearance:  alert, chronically ill appearing, and in no acute distress  Mental status: oriented to person, does not know where he is  Head:  normocephalic, atraumatic  Eye: no icterus, redness, pupils equal and reactive, extraocular eye movements intact, conjunctiva clear  Ear: normal external ear, no discharge, hearing intact  Nose:  no drainage noted  Mouth: mucous membranes moist  Neck: supple, no carotid bruits, thyroid not palpable  Lungs: Bilateral equal air entry, clear to ausculation, no wheezing, rales or rhonchi, normal effort  Cardiovascular: normal rate, regular rhythm, no murmur, gallop, rub  Abdomen: Soft, epig ttp and also to a lesser degree ttp ruq, nondistended, normal bowel sounds, no hepatomegaly or splenomegaly  Neurologic: There are no new focal motor or sensory deficits, normal muscle tone and bulk, no abnormal sensation, normal speech, cranial nerves II through XII grossly intact  Skin: No gross lesions, rashes, bruising or bleeding on exposed skin area  Extremities:  peripheral pulses palpable, no pedal edema or calf pain with palpation  Psych: normal affect     Investigations:      Laboratory Testing:  Recent Results (from the past 24 hour(s))   Lactate, Sepsis    Collection Time: 01/24/22  3:32 PM   Result Value Ref Range    Lactic Acid, Sepsis 3.7 (H) 0.5 - 1.9 mmol/L    Lactic Acid, Sepsis, Whole Blood NOT REPORTED 0.5 - 1.9 mmol/L   CBC Auto Differential    Collection Time: 01/24/22  4:45 PM   Result Value Ref Range    WBC 8.0 3.5 - 11.0 k/uL    RBC 4.21 (L) 4.5 - 5.9 m/uL    Hemoglobin 12.6 (L) 13.5 - 17.5 g/dL    Hematocrit 38.0 (L) 41 - 53 %    MCV 90.2 80 - 100 fL    MCH 30.0 26 - 34 pg    MCHC 33.2 31 - 37 g/dL    RDW 20.6 (H) 12.5 - 15.4 %    Platelets 539 013 - 385 k/uL    MPV 7.8 6.0 - 12.0 fL    NRBC Automated NOT REPORTED per 100 WBC    Differential Type NOT REPORTED     Immature Granulocytes NOT REPORTED 0 %    Absolute Immature Granulocyte NOT REPORTED 0.00 - 0.30 k/uL    WBC Morphology NOT REPORTED     RBC Morphology NOT REPORTED     Platelet Estimate NOT REPORTED     Seg Neutrophils 98 (H) 36 - 66 %    Lymphocytes 2 (L) 24 - 44 %    Monocytes 0 (L) 1 - 7 %    Eosinophils % 0 (L) 1 - 4 %    Basophils 0 0 - 2 %    Segs Absolute 7.84 (H) 1.8 - 7.7 k/uL    Absolute Lymph # 0.16 (L) 1.0 - 4.8 k/uL    Absolute Mono # 0.00 (L) 0.1 - 0.8 k/uL    Absolute Eos # 0.00 0.0 - 0.4 k/uL    Basophils Absolute 0.00 0.0 - 0.2 k/uL    Morphology ANISOCYTOSIS PRESENT     Morphology 1+ SCHISTOCYTES Comprehensive Metabolic Panel    Collection Time: 01/24/22  4:45 PM   Result Value Ref Range    Glucose 66 (L) 70 - 99 mg/dL    BUN 20 8 - 23 mg/dL    CREATININE 0.99 0.70 - 1.20 mg/dL    Bun/Cre Ratio NOT REPORTED 9 - 20    Calcium 9.1 8.6 - 10.4 mg/dL    Sodium 136 135 - 144 mmol/L    Potassium 4.5 3.7 - 5.3 mmol/L    Chloride 100 98 - 107 mmol/L    CO2 26 20 - 31 mmol/L    Anion Gap 10 9 - 17 mmol/L    Alkaline Phosphatase 825 (H) 40 - 129 U/L     (H) 5 - 41 U/L     (H) <40 U/L    Total Bilirubin 2.63 (H) 0.3 - 1.2 mg/dL    Total Protein 6.5 6.4 - 8.3 g/dL    Albumin 2.7 (L) 3.5 - 5.2 g/dL    Albumin/Globulin Ratio 0.7 (L) 1.0 - 2.5    GFR Non-African American >60 >60 mL/min    GFR African American >60 >60 mL/min    GFR Comment          GFR Staging NOT REPORTED    SPECIMEN REJECTION    Collection Time: 01/24/22  4:45 PM   Result Value Ref Range    Specimen Source . BLOOD     Ordered Test LACDS     Reason for Rejection Unable to perform testing: No specimen received. - NOT REPORTED    Lipase    Collection Time: 01/24/22  4:45 PM   Result Value Ref Range    Lipase 1,277 (H) 13 - 60 U/L   Troponin    Collection Time: 01/24/22  4:45 PM   Result Value Ref Range    Troponin, High Sensitivity 42 (H) 0 - 22 ng/L    Troponin T NOT REPORTED <0.03 ng/mL    Troponin Interp NOT REPORTED    Protime-INR    Collection Time: 01/24/22  4:45 PM   Result Value Ref Range    Protime 11.8 9.4 - 12.6 sec    INR 1.2    APTT    Collection Time: 01/24/22  4:45 PM   Result Value Ref Range    PTT 29.9 21.3 - 31.3 sec   COVID-19, Rapid    Collection Time: 01/24/22  5:45 PM    Specimen: Nasopharyngeal Swab   Result Value Ref Range    Specimen Description . NASOPHARYNGEAL SWAB     SARS-CoV-2, Rapid Not Detected Not Detected   EKG 12 Lead    Collection Time: 01/24/22  7:01 PM   Result Value Ref Range    Ventricular Rate 79 BPM    Atrial Rate 79 BPM    P-R Interval 228 ms    QRS Duration 112 ms    Q-T Interval 430 ms    QTc Calculation ([place]) 493 ms    P Axis 72 degrees    R Axis -24 degrees    T Axis 110 degrees   Culture, Blood 1    Collection Time: 01/24/22  7:59 PM    Specimen: Blood   Result Value Ref Range    Specimen Description . BLOOD     Special Requests 5CC LEFT HAND      Culture NO GROWTH <24 HRS    Culture, Blood 1    Collection Time: 01/24/22  8:09 PM    Specimen: Blood   Result Value Ref Range    Specimen Description . BLOOD     Special Requests 10ML LEFT ARM     Culture NO GROWTH <24 HRS    Lactate, Sepsis    Collection Time: 01/24/22  8:10 PM   Result Value Ref Range    Lactic Acid, Sepsis 2.9 (H) 0.5 - 1.9 mmol/L    Lactic Acid, Sepsis, Whole Blood NOT REPORTED 0.5 - 1.9 mmol/L   POC Glucose Fingerstick    Collection Time: 01/24/22 11:04 PM   Result Value Ref Range    POC Glucose 79 75 - 110 mg/dL   POC Glucose Fingerstick    Collection Time: 01/25/22  4:56 AM   Result Value Ref Range    POC Glucose 76 75 - 110 mg/dL   Comprehensive Metabolic Panel w/ Reflex to MG    Collection Time: 01/25/22  5:24 AM   Result Value Ref Range    Glucose 134 (H) 70 - 99 mg/dL    BUN 19 8 - 23 mg/dL    CREATININE 1.05 0.70 - 1.20 mg/dL    Bun/Cre Ratio NOT REPORTED 9 - 20    Calcium 8.3 (L) 8.6 - 10.4 mg/dL    Sodium 137 135 - 144 mmol/L    Potassium 4.3 3.7 - 5.3 mmol/L    Chloride 105 98 - 107 mmol/L    CO2 26 20 - 31 mmol/L    Anion Gap 6 (L) 9 - 17 mmol/L    Alkaline Phosphatase 664 (H) 40 - 129 U/L     (H) 5 - 41 U/L     (H) <40 U/L    Total Bilirubin 1.67 (H) 0.3 - 1.2 mg/dL    Total Protein 5.8 (L) 6.4 - 8.3 g/dL    Albumin 2.3 (L) 3.5 - 5.2 g/dL    Albumin/Globulin Ratio 0.7 (L) 1.0 - 2.5    GFR Non-African American >60 >60 mL/min    GFR African American >60 >60 mL/min    GFR Comment          GFR Staging NOT REPORTED    Lipase    Collection Time: 01/25/22  5:24 AM   Result Value Ref Range    Lipase 564 (H) 13 - 60 U/L   Amylase    Collection Time: 01/25/22  5:24 AM   Result Value Ref Range    Amylase 263 (H) 28 - 100 U/L   Magnesium    Collection Time: 01/25/22  5:24 AM   Result Value Ref Range    Magnesium 1.9 1.6 - 2.6 mg/dL   Phosphorus    Collection Time: 01/25/22  5:24 AM   Result Value Ref Range    Phosphorus 2.5 2.5 - 4.5 mg/dL   CBC    Collection Time: 01/25/22  5:24 AM   Result Value Ref Range    WBC 7.2 3.5 - 11.0 k/uL    RBC 3.39 (L) 4.5 - 5.9 m/uL    Hemoglobin 10.3 (L) 13.5 - 17.5 g/dL    Hematocrit 31.0 (L) 41 - 53 %    MCV 91.6 80 - 100 fL    MCH 30.4 26 - 34 pg    MCHC 33.2 31 - 37 g/dL    RDW 20.4 (H) 12.5 - 15.4 %    Platelets 948 946 - 254 k/uL    MPV 7.7 6.0 - 12.0 fL    NRBC Automated NOT REPORTED per 100 WBC   Protime-INR    Collection Time: 01/25/22  5:24 AM   Result Value Ref Range    Protime 12.1 9.4 - 12.6 sec    INR 1.2    POC Glucose Fingerstick    Collection Time: 01/25/22  8:03 AM   Result Value Ref Range    POC Glucose 79 75 - 110 mg/dL       Imaging/Diagnostics:  CT HEAD WO CONTRAST    Result Date: 1/24/2022  No acute intracranial abnormality. Senescent changes including chronic microvascular change with advancement since prior exam.     CT CERVICAL SPINE WO CONTRAST    Result Date: 1/24/2022  No acute bony abnormality of the cervical spine on a background of advanced degenerative changes. CT ABDOMEN PELVIS W IV CONTRAST Additional Contrast? None    Result Date: 1/24/2022  Suboptimal study due to patient motion. Significantly distended gallbladder with gallbladder wall thickening. Findings are concerning for acute cholecystitis. For further evaluation right upper quadrant ultrasound is recommended. Interval development of mild intra and extrahepatic biliary dilatation. Apparent stranding within the upper abdomen predominantly surrounding the pancreas. Finding is likely artifactual related to extensive patient motion. However correlation with the patient lab profile is recommended to exclude pancreatitis. No discrete evidence of colitis is noted on the acquired images.  Supra iliac

## 2022-01-25 NOTE — PROGRESS NOTES
Physical Therapy    Facility/Department: [facility] SURG ICU  Initial Assessment    NAME: Jake Aragon  : 1935  MRN: [id number]    Date of Service: 2022   Chief Complaint   Patient presents with    Emesis     cdiff diagnosis 2 weeks ago, started vanco 5 days ago, started vomiting x2 days, worried about dehydration       Discharge Recommendations:  Continue to assess pending progress,Patient would benefit from continued therapy after discharge   PT Equipment Recommendations  Equipment Needed: No  Other: Unsure of patients equipment at this time. Assessment   Body structures, Functions, Activity limitations: Decreased functional mobility ; Decreased balance;Decreased strength;Decreased safe awareness;Decreased endurance  Assessment: Pt presents with generalized weakness and severely impaired mobility. Pt is a poor historian so unsure of pts prior level but pt did report he does not ambulate. Pt currently requires Max assist x 2 for bed mobility and the use of the [facility] for transfers. Pt lives at a facility and should be able to return with 24/7 care. Pt may benefit from further therapy upon discharge dependent upon prior level. Treatment Diagnosis: dec mobility  Prognosis: Fair  Decision Making: Medium Complexity  PT Education: Goals; General Safety;PT Role;Plan of Care; Functional Mobility Training;Transfer Training  REQUIRES PT FOLLOW UP: Yes  Activity Tolerance  Activity Tolerance: Patient limited by fatigue;Patient limited by endurance       Patient Diagnosis(es): The primary encounter diagnosis was Acute cholecystitis. Diagnoses of Dehydration and Nausea and vomiting, intractability of vomiting not specified, unspecified vomiting type were also pertinent to this visit.      has a past medical history of A-fib [facility]), CVA (cerebral vascular accident) ([facility] [address] and Myocardial infarct, old.   has a past surgical history that includes Cardioversion (N/A, 9/24/2020). Restrictions  Restrictions/Precautions  Restrictions/Precautions: General Precautions,Contact Precautions  Required Braces or Orthoses?: No  Implants present? : Metal implants  Position Activity Restriction  Other position/activity restrictions: c-diff contact precautions  Vision/Hearing  Hearing: Exceptions to [facility]  Hearing Exceptions: Hard of hearing/hearing concerns     Subjective  General  Patient assessed for rehabilitation services?: Yes  Family / Caregiver Present: No  Referring Practitioner: Frankie Vizcarra  Referral Date : 01/24/22  Diagnosis: acute cholecyctitis  Follows Commands: Impaired  Other (Comment): delayed processing  Subjective  Subjective: Pt supine in bed, difficult to arouse and pt answering questions minimally. Pain Screening  Patient Currently in Pain: Denies  Pain Assessment  Pain Assessment: 0-10  Vital Signs  Patient Currently in Pain: Denies       Orientation     Social/Functional History  Social/Functional History  Lives With: Other (comment) (Was in a facility with hospice)  Type of Home: Facility  Home Layout: One level  Home Access: Level entry  Receives Help From: [email or web address] care attendant  ADL Assistance: Needs assistance  Homemaking Responsibilities: No  Ambulation Assistance: Needs assistance  Transfer Assistance: Needs assistance  Active : No  Additional Comments: Pt poor historian and did not answer all questions posed to him. Demo'd delayed processing, required Max Verbal and Tactile Cues throughout. Cognition   Cognition  Overall Cognitive Status: Exceptions  Arousal/Alertness: Inconsistent responses to stimuli  Following Commands: Follows one step commands with increased time; Follows one step commands with repetition; Inconsistently follows commands  Problem Solving: Assistance required to generate solutions;Assistance required to correct errors made;Assistance required to implement solutions  Insights: Decreased awareness of deficits  Initiation: Requires cues for all  Sequencing: Requires cues for all    Objective          PROM RLE (degrees)  RLE PROM: WFL  PROM LLE (degrees)  LLE PROM: Exceptions  LLE General PROM: pt has contracture (L) LE but is able to straighten it out on his own with ~15 degree knee extension deficit  Strength RLE  Strength RLE: Exception  Comment: Grossly 3-/5  Strength LLE  Strength LLE: Exception  Comment: Grossly 3-/5     Sensation  Overall Sensation Status: WFL  Bed mobility  Supine to Sit: Maximum assistance;2 Person assistance  Sit to Supine: Maximum assistance;2 Person assistance  Scooting: Maximal assistance  Comment: Pt Max assist x 2 with all bed mobility; Mod-Max assist to maintain sitting balance with pt frequently displaying forward bent trunk with cues to correct posture to upright sitting. Transfers  Sit to Stand: Minimal Assistance;2 Person Assistance  Stand to sit: Minimal Assistance;2 Person Assistance  Stand Pivot Transfers: Unable to assess  Comment: Sit to  [place] with Min assist x 2; pt able to stand ~ 10 seconds; transfer to chair via [place] [employer].   Ambulation  Ambulation?:  (Pt reported he does not ambulate.)  Stairs/Curb  Stairs?: No     Balance  Posture: Poor  Sitting - Static: Poor;+  Standing - Static: Poor;+  Standing - Dynamic: Poor  Comments: standing balance assessed with [place]        Plan   Plan  Times per week: 5-6x/week  Times per day: Daily  Current Treatment Recommendations: Strengthening,Safety Education & Garnette Iha Training,Patient/Caregiver Education & Training,Functional Mobility Training,Transfer Training  Safety Devices  Type of devices: Left in chair,Call light within reach,Chair alarm in place,Gait belt,Nurse notified  Restraints  Initially in place: No    G-Code       OutComes Score                                                  AM-PAC Score     AM-PAC Inpatient Mobility without Stair Climbing Raw Score : 7 (01/25/22 4846)  AM-PAC Inpatient without Stair Climbing T-Scale Score : 28.66 (01/25/22 1229)  Mobility Inpatient CMS 0-100% Score: 86.29 (01/25/22 1229)  Mobility Inpatient without Stair CMS G-Code Modifier : CM (01/25/22 1229)       Goals  Short term goals  Time Frame for Short term goals: 14 visits  Short term goal 1: Pt to perform supine LE PRE's AAROM x 15 reps to improve/maintain ROM and strength for transfers. Short term goal 2: Pt to perform bed mobility with Min-Mod assist x 1. Short term goal 3: Pt to perform transfer bed to chair with Mod assist x 1 to be able to get out of bed.   Patient Goals   Patient goals : None stated       Therapy Time   Individual Concurrent Group Co-treatment   Time In 1121         Time Out 1149         Minutes 28         Timed Code Treatment Minutes: 8 Minutes       Naseem Kwok PT

## 2022-01-25 NOTE — CONSULTS
[facility]      Patient's Name/ Date of Birth/ Gender: Maximiliano Slater / 1935 (82 y.o.) / male     Referring Physician: Edward Anderson DO    Consulting Physician: Dr. aMrk Vega     CC: abnormal CT abd/pelv, transaminitis    History of present Illness: Maximiliano Slater is a 82 y.o. male, evaluated in ED last night, current resident at [facility], reportedly had abdominal pain and also reports of nausea/emesis as well as poor PO intake. CT abd/pelv significant for dilated GB with thickened wall, found to have acutely elevated transaminases although pt has had transaminitis intermittently over the past several months. Chart review shows previous evaluation by Dr. Rozina Lizama with instructions to obtain GB/liver US but none performed since then. Pt was not interviewed due to dementia, history obtained from medical records. Past Medical History:  has a past medical history of A-[facility]), CVA (cerebral vascular accident) ([facility] [address] and Myocardial infarct, old. Past Surgical History:   Past Surgical History:   Procedure Laterality Date    CARDIOVERSION N/A 9/24/2020    CARDIOVERSION performed by Tom Goodman MD at [address] History:  reports that he has never smoked. He has never used smokeless tobacco.    Family History: Family history is unknown by patient. Review of Systems: unable to obtain due to pt status    Allergies: Patient has no known allergies.     Current Meds:  Current Facility-Administered Medications:     piperacillin-tazobactam (ZOSYN) 3,375 mg in dextrose 5 % 50 mL IVPB extended infusion (mini-bag), 3,375 mg, IntraVENous, Q8H, Godfrey Anderson DO    sodium chloride flush 0.9 % injection 10 mL, 10 mL, IntraVENous, PRN, Art De Paz MD, 10 mL at 01/24/22 1746    [Held by provider] apixaban (ELIQUIS) tablet 5 mg, 5 mg, Oral, BID, JORDEN Dominguez - CNP, 5 mg at 01/25/22 0800    aspirin EC tablet 81 mg, 81 mg, Oral, Daily, Danna Condon Genevive Ramona, APRN - CNP, 81 mg at 01/25/22 0800    atorvastatin (LIPITOR) tablet 80 mg, 80 mg, Oral, Daily, Mario Bible, APRN - CNP, 80 mg at 01/25/22 0800    buPROPion [facility]) extended release tablet 150 mg, 150 mg, Oral, BID, Mario Bible, APRN - CNP, 150 mg at 01/25/22 0800    FLUoxetine (PROZAC) capsule 40 mg, 40 mg, Oral, Daily, Mario Bible, APRN - CNP, 40 mg at 01/25/22 0800    furosemide (LASIX) tablet 20 mg, 20 mg, Oral, Daily, Mario Bible, APRN - CNP, 20 mg at 01/25/22 0801    levothyroxine (SYNTHROID) tablet 100 mcg, 100 mcg, Oral, Daily, Mario Bible, APRN - CNP, 100 mcg at 01/25/22 0800    lisinopril (PRINIVIL;ZESTRIL) tablet 40 mg, 40 mg, Oral, Daily, Mario Bible, APRN - CNP, 40 mg at 01/25/22 0801    magnesium oxide (MAG-OX) tablet 400 mg, 400 mg, Oral, Daily, Mario Bible, APRN - CNP, 400 mg at 01/25/22 0800    metoprolol succinate (TOPROL XL) extended release tablet 50 mg, 50 mg, Oral, Daily, Mario Bible, APRN - CNP, 50 mg at 01/25/22 0800    pantoprazole (PROTONIX) tablet 40 mg, 40 mg, Oral, QAM AC, Mario Bible, APRN - CNP, 40 mg at 01/25/22 0800    potassium chloride (KLOR-CON M) extended release tablet 20 mEq, 20 mEq, Oral, Daily with breakfast, Mario Bible, APRN - CNP, 20 mEq at 01/25/22 0802    vancomycin (VANCOCIN) capsule 125 mg, 125 mg, Oral, 4x Daily, Mario Bible, APRN - CNP, 125 mg at 01/25/22 0801    sodium chloride flush 0.9 % injection 5-40 mL, 5-40 mL, IntraVENous, 2 times per day, Mario Bible, APRN - CNP, 10 mL at 01/25/22 0801    sodium chloride flush 0.9 % injection 5-40 mL, 5-40 mL, IntraVENous, PRN, Mario Bible, APRN - CNP    0.9 % sodium chloride infusion, 25 mL, IntraVENous, PRN, Mario Bible, APRN - CNP    potassium chloride 10 mEq/100 mL IVPB (Peripheral Line), 10 mEq, IntraVENous, PRN, Evonnie Batch Fanny Rich, APRN - CNP    magnesium sulfate 1000 mg in dextrose 5% 100 mL IVPB, 1,000 mg, IntraVENous, PRN, Aaron Slipper, APRN - CNP    acetaminophen (TYLENOL) tablet 650 mg, 650 mg, Oral, Q4H PRN, Kimberly Slipper, APRN - CNP    HYDROcodone-acetaminophen (NORCO) 5-325 MG per tablet 1 tablet, 1 tablet, Oral, Q4H PRN **OR** HYDROcodone-acetaminophen (NORCO) 5-325 MG per tablet 2 tablet, 2 tablet, Oral, Q4H PRN, Kimberly Slipper, APRN - CNP, 2 tablet at 01/25/22 0735    HYDROmorphone (DILAUDID) injection 0.25 mg, 0.25 mg, IntraVENous, Q3H PRN **OR** HYDROmorphone (DILAUDID) injection 0.5 mg, 0.5 mg, IntraVENous, Q3H PRN, Kimberly Slipper, APRN - CNP    ondansetron (ZOFRAN-ODT) disintegrating tablet 4 mg, 4 mg, Oral, Q8H PRN **OR** ondansetron (ZOFRAN) injection 4 mg, 4 mg, IntraVENous, Q6H PRN, Kimberly Slipper, APRN - CNP    dextrose 5 % and 0.9 % sodium chloride infusion, , IntraVENous, Continuous, Godfrey P Blood, DO, Last Rate: 100 mL/hr at 01/25/22 0838, Rate Change at 01/25/22 0838    morphine (PF) injection 2 mg, 2 mg, IntraVENous, Q2H PRN, 2 mg at 01/24/22 2254 **OR** morphine injection 4 mg, 4 mg, IntraVENous, Q2H PRN, Kimberly Slipper, APRN - CNP    Vital Signs:  Vitals:    01/25/22 0819   BP: 117/79   Pulse: 70   Resp: 16   Temp: 97.5 °F (36.4 °C)   SpO2: 97%       Physical Exam:  Vital signs and Nurse's note reviewed    General Appearance:  Resting comfortably, difficult to arouse, NAD  Skin:  Skin color, texture, turgor normal. No rashes or lesions. Head/face:  NCAT, face symmetrical  Ears:  External ears and canals grossly normal, no evidence of otorrhea. Nose/Sinuses:  Nares normal. Septum midline. Mucosa normal. No external drainage noted. Mouth/Neck:  Mucosa moist.  No external oral lesions. Trachea midline. No visible masses. Lungs:  Normal chest expansion, unlabored breathing without accessory muscle use.    No audible rales, rhonchi, or wheezing. Cardiovascular: S1S2. No evidence of JVD. No evidence of pulsatile masses in abdomen  Abdomen:  Soft, non-tender, no organomegaly, no masses. Musculoskeletal: No evidence of bony/muscular deformities, trauma, atrophy of either left/right upper/lower extremity. No evidence of digital clubbing or cyanosis. Labs:   CBC:   Recent Labs     01/24/22 1645 01/25/22  0524   WBC 8.0 7.2   HGB 12.6* 10.3*    191     BMP:    Recent Labs     01/24/22 1645 01/25/22  0524    137   K 4.5 4.3    105   CO2 26 26   BUN 20 19   CREATININE 0.99 1.05   GLUCOSE 66* 134*     Hepatic:   Recent Labs     01/24/22 1645 01/25/22 0524   * 475*   * 302*   ALKPHOS 825* 664*   BILITOT 2.63* 1.67*   LIPASE 1,277* 564*   AMYLASE  --  263*     Coagulation:   Recent Labs     01/24/22 1645 01/25/22 0524   APTT 29.9  --    PROT 6.5 5.8*   INR 1.2 1.2         Imaging:  CT HEAD WO CONTRAST    Result Date: 1/24/2022  EXAMINATION: CT OF THE HEAD WITHOUT CONTRAST  1/24/2022 5:12 pm TECHNIQUE: CT of the head was performed without the administration of intravenous contrast. Dose modulation, iterative reconstruction, and/or weight based adjustment of the mA/kV was utilized to reduce the radiation dose to as low as reasonably achievable. COMPARISON: 22 October 2020 HISTORY: ORDERING SYSTEM PROVIDED HISTORY: possible fall, altered mental status TECHNOLOGIST PROVIDED HISTORY: possible fall, altered mental status Reason for Exam: possible fall, altered mental status FINDINGS: BRAIN/VENTRICLES: There is no acute intracranial hemorrhage, mass effect or midline shift. No abnormal extra-axial fluid collection. The gray-white differentiation is maintained without evidence of an acute infarct. There is no evidence of hydrocephalus. Scattered white matter hypodensity is present consistent with chronic microvascular change, extensive and worsened since prior study. Small remote lacunar infarcts are present. Calcification of the vertebral and cavernous carotid arteries is noted. Diffuse mild cortical atrophy is evident. ORBITS: The visualized portion of the orbits demonstrate no acute abnormality. SINUSES: The visualized paranasal sinuses and mastoid air cells demonstrate no acute abnormality. SOFT TISSUES/SKULL:  No acute abnormality of the visualized skull or soft tissues. No acute intracranial abnormality. Senescent changes including chronic microvascular change with advancement since prior exam.     CT CERVICAL SPINE WO CONTRAST    Result Date: 1/24/2022  EXAMINATION: CT OF THE CERVICAL SPINE WITHOUT CONTRAST 1/24/2022 2:12 pm TECHNIQUE: CT of the cervical spine was performed without the administration of intravenous contrast. Multiplanar reformatted images are provided for review. Dose modulation, iterative reconstruction, and/or weight based adjustment of the mA/kV was utilized to reduce the radiation dose to as low as reasonably achievable. COMPARISON: Concurrent head CT, CT C-spine 10/22/2020 HISTORY: ORDERING SYSTEM PROVIDED HISTORY: fall TECHNOLOGIST PROVIDED HISTORY: fall Decision Support Exception - unselect if not a suspected or confirmed emergency medical condition->Emergency Medical Condition (MA) Reason for Exam: possible fall, altered mental status FINDINGS: BONES/ALIGNMENT: There is no acute fracture or traumatic subluxation. There is exaggerated thoracic kyphosis with resultant forward head tilt. Unchanged trace degenerative retrolisthesis at C3 through C6 and trace grade 1 degenerative anterolisthesis at T1 on T2. DEGENERATIVE CHANGES: Advanced disc, osseous, and facet degenerative changes are present throughout the cervical spine with moderate to severe spinal canal stenosis at C5-C6 and moderate narrowing at C3 through C5. Moderate to severe osseous neuroforaminal stenosis at all levels with the exception of sparing at C2-C3 on the right.  SOFT TISSUES: There is no prevertebral soft tissue swelling. Punctate focus of intravenous gas at the base of the neck on the right related to peripheral IV. Visualized portion of the lung apices are clear. No acute bony abnormality of the cervical spine on a background of advanced degenerative changes. CT ABDOMEN PELVIS W IV CONTRAST Additional Contrast? None    Result Date: 1/24/2022  EXAMINATION: CT OF THE ABDOMEN AND PELVIS WITH CONTRAST 1/24/2022 5:12 pm TECHNIQUE: CT of the abdomen and pelvis was performed with the administration of intravenous contrast. Multiplanar reformatted images are provided for review. Dose modulation, iterative reconstruction, and/or weight based adjustment of the mA/kV was utilized to reduce the radiation dose to as low as reasonably achievable. COMPARISON: Chest CTA of 04/15/2020 HISTORY: ORDERING SYSTEM PROVIDED HISTORY: abdominal pain, vomiting TECHNOLOGIST PROVIDED HISTORY: abdominal pain, vomiting Decision Support Exception - unselect if not a suspected or confirmed emergency medical condition->Emergency Medical Condition (MA) Reason for Exam:  currently being treated for c diff colitis abdominal pain vomiting x 2 days FINDINGS: LOWER CHEST:  Visualized portion of the lower chest demonstrates no acute abnormality. KIDNEYS AND URINARY TRACT: No renal calculi are identified. There is no evidence for hydronephrosis. The ureters are of normal course and caliber. ORGANS: The gallbladder is significantly distended and there is evidence gallbladder wall thickening. The findings can be suggestive of acute cholecystitis. Interval development of mild intra and extrahepatic biliary dilatation. Unchanged simple cysts within the left lobe of the liver. Apparent stranding surrounding the pancreas. The finding is likely artifactual related to extensive motion on the acquired images. Visualized portions of the liver, spleen, pancreas,  and adrenal glands demonstrate no other abnormality. GI/BOWEL: No bowel obstruction.   No evidence of acute appendicitis. Diverticulosis with no signs of diverticulitis. PELVIS: The bladder and pelvic organs are unremarkable. PERITONEUM/RETROPERITONEUM: No free air or free fluid is noted. No pathologically enlarged lymphadenopathy. Supra iliac infrarenal aortic aneurysm, measuring approximately 2.4 x 3.2 cm. BONES/SOFT TISSUES: The osseous structures demonstrate no acute abnormality. Changes related to left hip arthroplasty. Mild dextroscoliosis with tip at L4-L5. Suboptimal study due to patient motion. Significantly distended gallbladder with gallbladder wall thickening. Findings are concerning for acute cholecystitis. For further evaluation right upper quadrant ultrasound is recommended. Interval development of mild intra and extrahepatic biliary dilatation. Apparent stranding within the upper abdomen predominantly surrounding the pancreas. Finding is likely artifactual related to extensive patient motion. However correlation with the patient lab profile is recommended to exclude pancreatitis. No discrete evidence of colitis is noted on the acquired images. Supra iliac infrarenal aortic aneurysm, measuring approximately 2.4 x 3.2 cm. 3 year follow-up is recommended. XR CHEST PORTABLE    Result Date: 1/24/2022  EXAMINATION: ONE XRAY VIEW OF THE CHEST 1/24/2022 5:49 pm COMPARISON: October 22, 2020 HISTORY: ORDERING SYSTEM PROVIDED HISTORY: chest pain TECHNOLOGIST PROVIDED HISTORY: chest pain Reason for Exam: chest pain FINDINGS: The cardiomediastinal silhouette is stable. The lungs are grossly clear. There is no pleural effusion. There is no pneumothorax. There is no acute osseous abnormality. No acute cardiopulmonary disease. Assessment:    Eri Xiong is a 82 y.o. male with     Abnormal CT abd/pelv including dilated and thickened GB  Transaminitis, improved this AM  Multiple comorbid conditions    Plan:    1. CT imaging was reviewed, no evidence of pericholecystic fluid. In the context of normal WBC it is difficult to say whether CT findings represent acute cholecystitis, RUQ US ordered. 2. Overall a poor candidate for surgery. Pt with RCRI score of 4 which puts pt at 11-20% risk of cardiac morbidity/mortality, likely will need transfer to a larger center for any surgery or procedure. Would strongly consider IR cholecystostomy tube if gallbladder needs to be decompressed, this would require transport/transfer to a larger center with IR service. 3. Given findings of biliary tree dilation, may need ERCP for decompression, would recommend GI evaluation for this pending US results  4. Reviewed notes regarding discussion with pt's son/HCPOA and plans for care for the patient. Given pt's cardiac status, if cholecystectomy becomes necessary then there is a high likelihood that pt will need an open cholecystectomy as he may not tolerate laparoscopic pneumoperitoneum, this carries significant risk of morbidity/mortality. At this time I would recommend avoiding surgery if at all possible, endoscopic/interventional treatments should be pursued first if pt's son desires aggressive treatment. Agree with Dr. Anderson regarding conversation about Hospice options.   5. Continue supportive care      Claire Brown DO  1/25/2022

## 2022-01-25 NOTE — PROGRESS NOTES
Pt to room. Lethargic, non verbal and no response to verbal stimuli pt does respond to physical and painful stimuli; v/s obtained, stable; son at bedside; linen and gown changed, diaper changed w/ barrier cream and mepilex applied to sacral area. Pt favors left hip w/ left knee contracture.  No eye contact made, PERRLA

## 2022-01-25 NOTE — PROGRESS NOTES
Occupational Therapy   Occupational Therapy Initial Assessment    Date: 2022   Patient Name: Linda Jara  MRN: [id number]     : 1935    Date of Service: 2022    Discharge Recommendations:  Patient would benefit from continued therapy after discharge     OT Equipment Recommendations  Equipment Needed:  (will continue to assess DME / DC recommendations dependent upon Pt's PLOF (to be determined))    Assessment   Performance deficits / Impairments: Decreased coordination;Decreased endurance;Decreased ADL status; Decreased posture;Decreased ROM; Decreased balance;Decreased strength;Decreased safe awareness  Assessment: Pt currently has deficits / impairments which impact his ability to return to prior living situation / prior level of functioning. Pt will benefit from continued participation in Acute OT services to improve those skills / functions. Prognosis: Fair  Decision Making: Medium Complexity  OT Education: Energy Conservation;Transfer Training;ADL Adaptive Strategies;Orientation;OT Role;Plan of Care;Family Education;Home Exercise Program;Equipment  Barriers to Learning: Pt verbalized / demo'd POOR return d/t decreased cognition. REQUIRES OT FOLLOW UP: Yes (Due to Pt as poor historian, Pt's PLOF is unknown at this time. Pt will benefit from ongoing Acute OT services. Need for / recommendation for ongoing  OT services after DC will be dependent upon Pt's PLOF (to be determined). )  Activity Tolerance  Activity Tolerance: Patient limited by fatigue;Treatment limited secondary to decreased cognition  Safety Devices  Safety Devices in place: Yes  Type of devices: All fall risk precautions in place;Call light within reach;Nurse notified; Left in chair;Chair alarm in place;Gait belt  Restraints  Initially in place: No        Patient Diagnosis(es): The primary encounter diagnosis was Acute cholecystitis.  Diagnoses of Dehydration and Nausea and vomiting, intractability of vomiting not specified, unspecified vomiting type were also pertinent to this visit. has a past medical history of A-fib [facility]), CVA (cerebral vascular accident) ([facility] [address] and Myocardial infarct, old.   has a past surgical history that includes Cardioversion (N/A, 9/24/2020). Restrictions  Restrictions/Precautions  Restrictions/Precautions: General Precautions,Contact Precautions  Required Braces or Orthoses?: No  Implants present? : Metal implants  Position Activity Restriction  Other position/activity restrictions: c-diff contact precautions    Subjective   General  Patient assessed for rehabilitation services?: Yes  Family / Caregiver Present: No  Diagnosis: C-Diff. Subjective  Subjective: RN approved Pt to be seen for Eval / Tx. Pt was cooperative, required Max Coaxing / Verbal and Tactile Cues. Patient Currently in Pain: Denies  Pain Assessment  Pain Assessment: 0-10  Pain Level: 0  Pre Treatment Pain Screening  Comments / Details: Pt denied having any pain before / during / after Therapy. Vital Signs  Patient Currently in Pain: Denies     Social/Functional History  Social/Functional History  Lives With: Other (comment) (Was in a facility with hospice)  Type of Home: Facility  Home Layout: One level  Home Access: Level entry  Receives Help From: [employer] [address] care attendant  ADL Assistance: Needs assistance  Homemaking Responsibilities: No  Ambulation Assistance: Needs assistance  Transfer Assistance: Needs assistance  Active : No  Additional Comments: Pt poor historian and did not answer all questions posed to him. Demo'd delayed processing, required Max Verbal and Tactile Cues throughout. Objective   Balance  Sitting Balance: Maximum assistance (Static sitting at EOB ~10 minutes with Max Assist progressing to Mod Assist at times. Max Verbal and Tactile Cues.   Demo'd increased forward flexion at trunk and Left sided lean.)  Standing Balance  Comment: 2 trials of upright standing in [facility] with [place] Assist (~30 sec each). Modified Standing with leaning into Bilateral Elbows with [place] x1 trial (~2-3 minutes). Functional Mobility  Functional Mobility Comments: Unable to assess. Required 2-person assist with [place] for standing / stand pivot transfer. ADL  UE Dressing: Maximum assistance;Verbal cueing; Increased time to complete  LE Dressing: Dependent/Total  Toileting: Dependent/Total  Additional Comments: Consistently required Max Verbal and Tactile Cues for task initiation / accurate sequencing / correct use of DME / maintenance of upright posture. Pt required increased time and effort for all tasks. Demo'd ongoing Left sided / forward flexion and leaning with posture (sitting at EOB and in recliner chair). Tone RUE  RUE Tone: Hypertonic  Tone LUE  LUE Tone: Hypertonic  Coordination  Coordination and Movement description: Right UE;Left UE;Decreased accuracy; Decreased speed     Bed mobility  Supine to Sit: Maximum assistance;2 Person assistance  Sit to Supine: Maximum assistance;2 Person assistance  Scooting: Maximal assistance  Comment: Max Assist x2 for bed mobility (HOB elevated with Handrails). Mod to Max Assist to maintain static sitting balance at EOB. Pt demo'd Left sided and forward flexion of trunk / core while sitting upright. Transfers  Sit to stand: Minimal assistance;2 Person assistance  Stand to sit: Minimal assistance;2 Person assistance  Transfer Comments: Sit to Stand at EOB / Stand to Sit at [address] Required Min X2 Assist with [place]. Required Max Verbal / Tactile Cues for task initiation, sequencing, accurate use of DME, integration / active participation (with increased time and effort). Cognition  Overall Cognitive Status: Exceptions  Arousal/Alertness: Inconsistent responses to stimuli;Delayed responses to stimuli  Following Commands: Follows one step commands with increased time; Follows one step commands with repetition; Inconsistently follows commands  Memory: Decreased short term memory;Decreased recall of recent events;Decreased recall of precautions;Decreased recall of biographical Information  Problem Solving: Assistance required to generate solutions;Assistance required to correct errors made;Assistance required to implement solutions  Insights: Decreased awareness of deficits  Initiation: Requires cues for all  Sequencing: Requires cues for all     Sensation  Overall Sensation Status: WFL      LUE AROM (degrees)  LUE General AROM: Unable to formally assess d/t decreased and inconsistent direction following. Pt demo'd WFL BUE AROM during t/f with Michalene Smaller. RUE AROM (degrees)  RUE General AROM: Unable to formally assess d/t decreased and inconsistent direction following. Pt demo'd WFL BUE AROM during t/f with Michalene Smaller. LUE Strength  LUE Strength Comment: Unable to formally assess. RUE Strength  RUE Strength Comment: Unable to formally assess. Plan   Plan  Times per week: 3-4x/week  Current Treatment Recommendations: Strengthening,Patient/Caregiver Education & Training,Home Management Training,ROM,Equipment Evaluation, Education, & procurement,Balance Training,Positioning,Endurance Training,Safety Education & Training,Self-Care / ADL    AM[facility] Score  [facility] Inpatient Daily Activity Raw Score: 9 (01/25/22 1306)  AM-PAC Inpatient ADL T-Scale Score : 25.33 (01/25/22 1306)  ADL Inpatient CMS 0-100% Score: 79.59 (01/25/22 1306)  ADL Inpatient CMS G-Code Modifier : CL (01/25/22 1306)    Goals  Short term goals  Time Frame for Short term goals: Within 14 treatment sessions  Short term goal 1: Pt will demo Dynamic Sitting Balance at EOB (~10-12 minuites) with Min Assist while participating in functional BUE activity. Short term goal 2: Pt will participate in Self-feeding / Self-grooming with Min Assist with 50% accurate sequencing.   Short term goal 3: Pt will participate in Toilet Transfers with [address] without LOB. Short term goal 4: Pt will maintain Static Standing Tolerance (~3-5 minutes) with Min Assist 1 person.        Therapy Time   Individual Concurrent Group Co-treatment   Time In 1121 1240       Time Out 1149 1250       Minutes 28 10       Timed Code Treatment Minutes: 23 Minutes (ADL)       PING Hernandez, OTR/L

## 2022-01-25 NOTE — ED PROVIDER NOTES
The patient who was seen on day shift and I really was not involved with the patient's management other than to talk to the admitting doctor about the patient's labs and condition and that was Hal Tapia and the patient will be admitted to the hospital.      Chandrakant Lopes MD  01/24/22 2006

## 2022-01-25 NOTE — CARE COORDINATION
Case Management Initial Discharge Plan  Eliana Grady,             Met with: information from chart and call to assisted living faciltiy Information verified: address, contacts, phone number, , insurance Yes  Insurance Provider: Medicare, [facility]    Emergency Contact/Next of Conor Chang name & number: [facility]) [phone] and [facility] [phone]  Who are involved in patient's support system? sons    PCP: Bridgett Chandler MD  Date of last visit: 2021 per Epic      Discharge Planning    Living Arrangements:    [address] at [address]    Patient able to perform ADL's:Dependent for all needs/ ADLs    Current Services (outpatient & in home) lives at assisted living facility  DME equipment: hospital bed  DME provider:     Is patient receiving oral anticoagulation therapy? Yes  - Eliquis    Potential Assistance Needed:       Patient agreeable to home care: has PT at [facility] of choice provided:   n/a    Prior SNF/Rehab Placement and Facility: yes - [facility]  Agreeable to SNF/Rehab: needs assessment  [place] of choice provided: n/a     Evaluation: no    Expected Discharge date:       Patient expects to be discharged to: If home: is the family and/or caregiver wiling & able to provide support at home? yes  Who will be providing this support? Lives in assisted living    Follow Up Appointment: Best Day/ Time:      Transportation provider: family  Transportation arrangements needed for discharge: Yes    Readmission Risk              Risk of Unplanned Readmission:  21         Does patient have a readmission risk score greater than 14?: No  If yes, follow-up appointment must be made within 7 days of discharge. Goals of Care:  Surgery evaluation       Educated patient on transitional options, provided freedom of choice and are agreeable with plan      Discharge Plan: await evals and continued plan  - SNF v Hospice.  Will need to discuss with AL if able to return at current level of care      Electronically signed by Desean Drummond RN on 1/25/22 at 10:02 AM EST    [phone] call to son Lily Given to review transition plans, states that he is on a call at this time and will call back - provided number to call.

## 2022-01-26 LAB
ALBUMIN SERPL-MCNC: 2.3 G/DL (ref 3.5–5.2)
ALBUMIN/GLOBULIN RATIO: 0.7 (ref 1–2.5)
ALP BLD-CCNC: 478 U/L (ref 40–129)
ALT SERPL-CCNC: 181 U/L (ref 5–41)
ANION GAP SERPL CALCULATED.3IONS-SCNC: 7 MMOL/L (ref 9–17)
AST SERPL-CCNC: 205 U/L
BILIRUB SERPL-MCNC: 0.8 MG/DL (ref 0.3–1.2)
BILIRUBIN DIRECT: 0.44 MG/DL
BILIRUBIN, INDIRECT: 0.36 MG/DL (ref 0–1)
BUN BLDV-MCNC: 12 MG/DL (ref 8–23)
CALCIUM SERPL-MCNC: 8.1 MG/DL (ref 8.6–10.4)
CHLORIDE BLD-SCNC: 101 MMOL/L (ref 98–107)
CO2: 26 MMOL/L (ref 20–31)
CREAT SERPL-MCNC: 0.85 MG/DL (ref 0.7–1.2)
GFR AFRICAN AMERICAN: >60 ML/MIN
GFR NON-AFRICAN AMERICAN: >60 ML/MIN
GFR SERPL CREATININE-BSD FRML MDRD: ABNORMAL ML/MIN/{1.73_M2}
GFR SERPL CREATININE-BSD FRML MDRD: ABNORMAL ML/MIN/{1.73_M2}
GLUCOSE BLD-MCNC: 91 MG/DL (ref 70–99)
HCT VFR BLD CALC: 29.9 % (ref 41–53)
HEMOGLOBIN: 9.9 G/DL (ref 13.5–17.5)
MCH RBC QN AUTO: 29.5 PG (ref 26–34)
MCHC RBC AUTO-ENTMCNC: 33.2 G/DL (ref 31–37)
MCV RBC AUTO: 88.9 FL (ref 80–100)
NRBC AUTOMATED: ABNORMAL PER 100 WBC
PDW BLD-RTO: 20.5 % (ref 12.5–15.4)
PLATELET # BLD: 153 K/UL (ref 140–450)
PMV BLD AUTO: 7.7 FL (ref 6–12)
POTASSIUM SERPL-SCNC: 3 MMOL/L (ref 3.7–5.3)
RBC # BLD: 3.36 M/UL (ref 4.5–5.9)
SODIUM BLD-SCNC: 134 MMOL/L (ref 135–144)
TOTAL PROTEIN: 5.4 G/DL (ref 6.4–8.3)
WBC # BLD: 3.7 K/UL (ref 3.5–11)

## 2022-01-26 PROCEDURE — 36415 COLL VENOUS BLD VENIPUNCTURE: CPT

## 2022-01-26 PROCEDURE — 97110 THERAPEUTIC EXERCISES: CPT

## 2022-01-26 PROCEDURE — 2580000003 HC RX 258: Performed by: INTERNAL MEDICINE

## 2022-01-26 PROCEDURE — 80053 COMPREHEN METABOLIC PANEL: CPT

## 2022-01-26 PROCEDURE — 2580000003 HC RX 258: Performed by: NURSE PRACTITIONER

## 2022-01-26 PROCEDURE — 85027 COMPLETE CBC AUTOMATED: CPT

## 2022-01-26 PROCEDURE — 6360000002 HC RX W HCPCS: Performed by: INTERNAL MEDICINE

## 2022-01-26 PROCEDURE — 2060000000 HC ICU INTERMEDIATE R&B

## 2022-01-26 PROCEDURE — 6370000000 HC RX 637 (ALT 250 FOR IP): Performed by: NURSE PRACTITIONER

## 2022-01-26 PROCEDURE — 6360000002 HC RX W HCPCS: Performed by: NURSE PRACTITIONER

## 2022-01-26 PROCEDURE — 99232 SBSQ HOSP IP/OBS MODERATE 35: CPT | Performed by: INTERNAL MEDICINE

## 2022-01-26 PROCEDURE — 97535 SELF CARE MNGMENT TRAINING: CPT

## 2022-01-26 PROCEDURE — 82248 BILIRUBIN DIRECT: CPT

## 2022-01-26 PROCEDURE — 97530 THERAPEUTIC ACTIVITIES: CPT

## 2022-01-26 RX ADMIN — PIPERACILLIN AND TAZOBACTAM 3375 MG: 3; .375 INJECTION, POWDER, LYOPHILIZED, FOR SOLUTION INTRAVENOUS at 01:15

## 2022-01-26 RX ADMIN — POTASSIUM CHLORIDE 10 MEQ: 7.46 INJECTION, SOLUTION INTRAVENOUS at 13:01

## 2022-01-26 RX ADMIN — POTASSIUM CHLORIDE 10 MEQ: 7.46 INJECTION, SOLUTION INTRAVENOUS at 12:03

## 2022-01-26 RX ADMIN — POTASSIUM CHLORIDE 10 MEQ: 7.46 INJECTION, SOLUTION INTRAVENOUS at 16:00

## 2022-01-26 RX ADMIN — FUROSEMIDE 20 MG: 20 TABLET ORAL at 09:14

## 2022-01-26 RX ADMIN — VANCOMYCIN HYDROCHLORIDE 125 MG: 125 CAPSULE ORAL at 12:58

## 2022-01-26 RX ADMIN — VANCOMYCIN HYDROCHLORIDE 125 MG: 125 CAPSULE ORAL at 16:59

## 2022-01-26 RX ADMIN — POTASSIUM CHLORIDE 20 MEQ: 1500 TABLET, EXTENDED RELEASE ORAL at 09:13

## 2022-01-26 RX ADMIN — SODIUM CHLORIDE, PRESERVATIVE FREE 10 ML: 5 INJECTION INTRAVENOUS at 09:14

## 2022-01-26 RX ADMIN — LISINOPRIL 40 MG: 20 TABLET ORAL at 09:14

## 2022-01-26 RX ADMIN — FLUOXETINE 40 MG: 20 CAPSULE ORAL at 09:13

## 2022-01-26 RX ADMIN — POTASSIUM CHLORIDE 10 MEQ: 7.46 INJECTION, SOLUTION INTRAVENOUS at 14:29

## 2022-01-26 RX ADMIN — ATORVASTATIN CALCIUM 80 MG: 40 TABLET, FILM COATED ORAL at 09:13

## 2022-01-26 RX ADMIN — VANCOMYCIN HYDROCHLORIDE 125 MG: 125 CAPSULE ORAL at 21:33

## 2022-01-26 RX ADMIN — METOPROLOL SUCCINATE 50 MG: 50 TABLET, EXTENDED RELEASE ORAL at 09:13

## 2022-01-26 RX ADMIN — PIPERACILLIN AND TAZOBACTAM 3375 MG: 3; .375 INJECTION, POWDER, LYOPHILIZED, FOR SOLUTION INTRAVENOUS at 16:59

## 2022-01-26 RX ADMIN — BUPROPION HYDROCHLORIDE 150 MG: 150 TABLET, FILM COATED, EXTENDED RELEASE ORAL at 09:13

## 2022-01-26 RX ADMIN — VANCOMYCIN HYDROCHLORIDE 125 MG: 125 CAPSULE ORAL at 09:13

## 2022-01-26 RX ADMIN — ASPIRIN 81 MG: 81 TABLET, COATED ORAL at 09:14

## 2022-01-26 RX ADMIN — DEXTROSE AND SODIUM CHLORIDE: 5; 900 INJECTION, SOLUTION INTRAVENOUS at 17:04

## 2022-01-26 RX ADMIN — LEVOTHYROXINE SODIUM 100 MCG: 50 TABLET ORAL at 09:16

## 2022-01-26 RX ADMIN — PIPERACILLIN AND TAZOBACTAM 3375 MG: 3; .375 INJECTION, POWDER, LYOPHILIZED, FOR SOLUTION INTRAVENOUS at 09:10

## 2022-01-26 RX ADMIN — PANTOPRAZOLE SODIUM 40 MG: 40 TABLET, DELAYED RELEASE ORAL at 09:16

## 2022-01-26 RX ADMIN — POTASSIUM CHLORIDE 10 MEQ: 7.46 INJECTION, SOLUTION INTRAVENOUS at 15:00

## 2022-01-26 RX ADMIN — BUPROPION HYDROCHLORIDE 150 MG: 150 TABLET, FILM COATED, EXTENDED RELEASE ORAL at 21:33

## 2022-01-26 RX ADMIN — DEXTROSE AND SODIUM CHLORIDE: 5; 900 INJECTION, SOLUTION INTRAVENOUS at 14:28

## 2022-01-26 RX ADMIN — MAGNESIUM OXIDE TAB 400 MG (241.3 MG ELEMENTAL MG) 400 MG: 400 (241.3 MG) TAB at 09:14

## 2022-01-26 RX ADMIN — POTASSIUM CHLORIDE 10 MEQ: 7.46 INJECTION, SOLUTION INTRAVENOUS at 17:06

## 2022-01-26 ASSESSMENT — PAIN SCALES - GENERAL
PAINLEVEL_OUTOF10: 0

## 2022-01-26 NOTE — PROGRESS NOTES
[facility]  Office: [address], , Katas Ema, DO, Kent Mohs, DO, Oren Anderson, DO, Ailyn Mehta MD, Benita Segovia MD, Dafne Lutz MD, Mahogany Dejesus MD, Rizwana Harris MD, Brenda Warren MD, Arielle Vines MD, Ambar Fairchild, DO, Mercedes Santillan, DO, Poli Zhang MD,  Philip Aj DO, Nicolás Hebert MD, Ninoska Cox MD, Angel Caro MD, Vanda Rich MD, Halima Harp MD, Randall Christopher MD, Paul Ferrara MD, Cori Zavala, [facility], [facility] Lorenzorayne, CNP, Lazaro Rodríguez, CNP, Wesley Bhatti, CNS, Fannie Manuel, [facility], Traci Duncan, CNP, Solo Salinas, CNP, Gisell Meade, CNP, Elizabeth Palacios, CNP, Facundo Solorzano PA-C, Alex Castañeda, [facility], Eladio Goldberg, [facility], Brooke Vasquez, CNP, Weston Richard, CNP, Dimas Bravo, CNP, Nisha Bliss, CNP, Ana Garcia, CNP, Betsy Durham [address]    Progress Note    1/26/2022    8:08 AM    Name:   Fantasma Sutton  MRN:     [id number]     Acct:      [de-identified]   Room:   [address] [place] [address] Day:  2  Admit Date:  1/24/2022  4:41 PM    PCP:   Safia Rashid MD  Code Status:  Full Code    Subjective:     C/C:   Chief Complaint   Patient presents with    Emesis     cdiff diagnosis 2 weeks ago, started vanco 5 days ago, started vomiting x2 days, worried about dehydration     Interval History Status: improved. Denies abd pain; feels ok  No n/v    Brief History:     Fantasma Sutton is a 82 y.o. Non- / non  male who presents with Emesis (cdiff diagnosis 2 weeks ago, started vanco 5 days ago, started vomiting x2 days, worried about dehydration)   and is admitted to the hospital for the management of Acute cholecystitis.     This 82 yom has had vomiting and diarrhea off and on since last September-diagnosed and treated for hep A at that time.   Then broke hip and underwent surgery last October-surgery went fairly well but  postop he had significant confusion for about 10 days.  [facility]; received the monoclonal antibody and recovered after that. When he had covid, he was actually placed on hospice. This has since been revoked and he entered snf for rehab. He now started having vomiting again and was recently diagnosed with c dif and is on po vanco at [facility] for this. He has not been eating for some time now and continues to lose weight. He was transferred from [facility] with ongoing vomiting and poor po intake. Review of Systems:     States no pain, otherwise unobtainable    Medications: Allergies:  No Known Allergies    Current Meds:   Scheduled Meds:    piperacillin-tazobactam  3,375 mg IntraVENous Q8H    [Held by provider] apixaban  5 mg Oral BID    aspirin  81 mg Oral Daily    atorvastatin  80 mg Oral Daily    buPROPion  150 mg Oral BID    FLUoxetine  40 mg Oral Daily    furosemide  20 mg Oral Daily    levothyroxine  100 mcg Oral Daily    lisinopril  40 mg Oral Daily    magnesium oxide  400 mg Oral Daily    metoprolol succinate  50 mg Oral Daily    pantoprazole  40 mg Oral QAM AC    potassium chloride  20 mEq Oral Daily with breakfast    vancomycin  125 mg Oral 4x Daily    sodium chloride flush  5-40 mL IntraVENous 2 times per day     Continuous Infusions:    sodium chloride      dextrose 5 % and 0.9 % NaCl 100 mL/hr at 01/25/22 1604     PRN Meds: sodium chloride flush, sodium chloride flush, sodium chloride, potassium chloride, magnesium sulfate, acetaminophen, HYDROcodone 5 mg - acetaminophen **OR** HYDROcodone 5 mg - acetaminophen, HYDROmorphone **OR** HYDROmorphone, ondansetron **OR** ondansetron, morphine **OR** morphine    Data:     Past Medical History:   has a past medical history of A-fib ([facility] [address] CVA (cerebral vascular accident) ([facility] [address] and Myocardial infarct, old. Social History:   reports that he has never smoked.  He has never used smokeless tobacco.     Family History:   Family History Family history unknown: Yes       Vitals:  BP (!) 145/73   Pulse 71   Temp 97.7 °F (36.5 °C) (Oral)   Resp 18   Ht 6' 6\" (1.981 m)   Wt 145 lb 8.1 oz (66 kg)   SpO2 97%   BMI 16.81 kg/m²   Temp (24hrs), Av.7 °F (36.5 °C), Min:97.5 °F (36.4 °C), Max:97.8 °F (36.6 °C)    Recent Labs     22  0456 22  0803   POCGLU 79 76 79       I/O (24Hr):     Intake/Output Summary (Last 24 hours) at 2022 0808  Last data filed at 2022 1810  Gross per 24 hour   Intake 1607.92 ml   Output --   Net 1607.92 ml       Labs:  Hematology:  Recent Labs     22  0524 22  0544   WBC 8.0 7.2 3.7   RBC 4.21* 3.39* 3.36*   HGB 12.6* 10.3* 9.9*   HCT 38.0* 31.0* 29.9*   MCV 90.2 91.6 88.9   MCH 30.0 30.4 29.5   MCHC 33.2 33.2 33.2   RDW 20.6* 20.4* 20.5*    191 153   MPV 7.8 7.7 7.7   INR 1.2 1.2  --      Chemistry:  Recent Labs     22  0524 22  0544    137 134*   K 4.5 4.3 3.0*    105 101   CO2 26 26 26   GLUCOSE 66* 134* 91   BUN 20 19 12   CREATININE 0.99 1.05 0.85   MG  --  1.9  --    ANIONGAP 10 6* 7*   LABGLOM >60 >60 >60   GFRAA >60 >60 >60   CALCIUM 9.1 8.3* 8.1*   CAION  --  1.22  --    PHOS  --  2.5  --    TROPHS 42*  --   --      Recent Labs     22/22  0456 22  0524 22  0803 22  0544   PROT 6.5  --   --  5.8*  --  5.4*   LABALBU 2.7*  --   --  2.3*  --  2.3*   *  --   --  475*  --  205*   *  --   --  302*  --  181*   ALKPHOS 825*  --   --  664*  --  478*   BILITOT 2.63*  --   --  1.67*  --  0.80   BILIDIR  --   --   --   --   --  0.44*   AMYLASE  --   --   --  263*  --   --    LIPASE 1,277*  --   --  564*  --   --    TRIG  --   --   --  53  --   --    POCGLU  --  79 76  --  79  --      ABG:No results found for: POCPH, PHART, PH, POCPCO2, AMJ7QUK, PCO2, POCPO2, PO2ART, PO2, POCHCO3, KEC8CYI, HCO3, NBEA, PBEA, BEART, BE, THGBART, THB, EUY7AJQ, MMLH8FXD, D8WMXETB, O2SAT, FIO2  Lab Results   Component Value Date/Time    SPECIAL 10ML LEFT ARM 01/24/2022 08:09 PM     Lab Results   Component Value Date/Time    CULTURE NO GROWTH 2 DAYS 01/24/2022 08:09 PM       Radiology:  CT HEAD WO CONTRAST    Result Date: 1/24/2022  No acute intracranial abnormality. Senescent changes including chronic microvascular change with advancement since prior exam.     CT CERVICAL SPINE WO CONTRAST    Result Date: 1/24/2022  No acute bony abnormality of the cervical spine on a background of advanced degenerative changes. CT ABDOMEN PELVIS W IV CONTRAST Additional Contrast? None    Result Date: 1/24/2022  Suboptimal study due to patient motion. Significantly distended gallbladder with gallbladder wall thickening. Findings are concerning for acute cholecystitis. For further evaluation right upper quadrant ultrasound is recommended. Interval development of mild intra and extrahepatic biliary dilatation. Apparent stranding within the upper abdomen predominantly surrounding the pancreas. Finding is likely artifactual related to extensive patient motion. However correlation with the patient lab profile is recommended to exclude pancreatitis. No discrete evidence of colitis is noted on the acquired images. Supra iliac infrarenal aortic aneurysm, measuring approximately 2.4 x 3.2 cm. 3 year follow-up is recommended. US GALLBLADDER RUQ    Result Date: 1/25/2022  1. Limited exam. 2.  Limited visualization of the gallbladder demonstrates mild wall thickening, nonspecific. Consider follow-up with hepatobiliary scan to exclude the possibility of acute cholecystitis. 3.  The biliary tree cannot be assessed on this exam. RECOMMENDATIONS: Unavailable     XR CHEST PORTABLE    Result Date: 1/24/2022  No acute cardiopulmonary disease.        Physical Examination:       General appearance:  alert, cooperative and no distress  Mental Status:  oriented to person, place and time and normal affect  Lungs:  clear to auscultation bilaterally, normal effort  Heart:  regular rate and rhythm, no murmur  Abdomen:  soft, nontender, nondistended, normal bowel sounds, no masses, hepatomegaly, splenomegaly  Extremities:  no edema, redness, tenderness in the calves  Skin:  no gross lesions, rashes, induration    Assessment:     Hospital Problems           Last Modified POA    * (Principal) Acute cholecystitis 1/25/2022 Yes    Paroxysmal atrial fibrillation ([facility] [address] 1/25/2022 Yes    Primary hypertension 1/25/2022 Yes    Hypothyroid 1/25/2022 Yes    Chronic systolic heart failure ([facility] [address] 1/25/2022 Yes    Acute biliary pancreatitis without infection or necrosis 1/25/2022 Yes    Late onset Alzheimer's dementia without behavioral disturbance ([facility] [address] 1/25/2022 Yes    Severe protein-calorie malnutrition ([facility] [address] 1/25/2022 Yes    C. difficile colitis 1/25/2022 Yes          Plan:     1. Will d/w surgery re: poc, and then d/w sons  2. Can start clears today if no plans for any procedures  3. Cont po vanco for c dif  4. Cont zosyn for possible cholecystitis  5.  Resume eliquis if no procedures planned    Godfrey Anderson DO  1/26/2022  8:08 AM

## 2022-01-26 NOTE — PLAN OF CARE
Problem: Skin Integrity:  Goal: Will show no infection signs and symptoms  Description: Will show no infection signs and symptoms  Outcome: Ongoing  Goal: Absence of new skin breakdown  Description: Absence of new skin breakdown  Outcome: Ongoing     Problem: Falls - Risk of:  Goal: Will remain free from falls  Description: Will remain free from falls  Outcome: Ongoing  Goal: Absence of physical injury  Description: Absence of physical injury  Outcome: Ongoing     Problem: Musculor/Skeletal Functional Status  Goal: Highest potential functional level  Outcome: Ongoing

## 2022-01-26 NOTE — PROGRESS NOTES
Physical Therapy  Facility/Department: [facility] ICU  Daily Treatment Note  NAME: Roanna Nyhan  : 1935  MRN: [id number]    Date of Service: 2022    Discharge Recommendations:  Patient would benefit from continued therapy after discharge   PT Equipment Recommendations  Equipment Needed: No  Other: Continue to assess pending improvement with mobility    Assessment   Body structures, Functions, Activity limitations: Decreased functional mobility ; Decreased balance;Decreased strength;Decreased safe awareness;Decreased endurance  Assessment: Pt with improving tolerance to mobility this date. Pt was able to stand with mod Ax1 with fran stedy and lift walker. Pt would be unsafe to return to his prior living arrangement and would benefit from further therapy at discharge to increase his safety and independence with mobility. Prognosis: Fair  PT Education: Transfer Training;Functional Mobility Training;PT Role;Plan of Care  REQUIRES PT FOLLOW UP: Yes  Activity Tolerance  Activity Tolerance: Patient limited by fatigue;Patient limited by endurance     Patient Diagnosis(es): The primary encounter diagnosis was Acute cholecystitis. Diagnoses of Dehydration and Nausea and vomiting, intractability of vomiting not specified, unspecified vomiting type were also pertinent to this visit. has a past medical history of A-fib [facility]), CVA (cerebral vascular accident) ([facility] [address] and Myocardial infarct, old.   has a past surgical history that includes Cardioversion (N/A, 2020). Restrictions  Restrictions/Precautions  Restrictions/Precautions: General Precautions,Contact Precautions,Fall Risk  Required Braces or Orthoses?: No  Position Activity Restriction  Other position/activity restrictions: c-diff contact precautions, up with assist  Subjective   General  Response To Previous Treatment: Patient with no complaints from previous session.   Family / Caregiver Present: No  Subjective  Subjective: Pt supine in bed and agreeable to therapy this afternoon. Pt denies any pain at rest. RN agreeable to therapy. Pt conversive with encouragement today. Pain Screening  Patient Currently in Pain: Denies  Vital Signs  Patient Currently in Pain: Denies       Orientation  Orientation  Orientation Level: Oriented to person  Cognition   Cognition  Overall Cognitive Status: Exceptions  Arousal/Alertness: Delayed responses to stimuli  Following Commands: Follows one step commands with increased time; Follows one step commands with repetition  Attention Span: Attends with cues to redirect  Memory: Decreased short term memory;Decreased recall of recent events;Decreased recall of precautions;Decreased recall of biographical Information  Problem Solving: Assistance required to generate solutions;Assistance required to correct errors made;Assistance required to implement solutions  Insights: Decreased awareness of deficits  Initiation: Requires cues for all  Sequencing: Requires cues for all  Objective   Bed mobility  Rolling to Left: Moderate assistance  Supine to Sit: Moderate assistance (mod A at LEs and min A at trunk; verbal and tactile cues for sequencing and use of bed rails; HOB raised 60 degrees)  Sit to Supine:  (retired to chair at end of session)  Scooting: Minimal assistance  Transfers  Sit to Stand: Moderate Assistance  Stand to sit: Moderate Assistance  Comment: stood with fran stedy 3x and lift walker once. Ambulation  Ambulation?: No (Unable to attempt ambulation with use of lift walker this date- fatigued and fearful)  Stairs/Curb  Stairs?: No     Balance  Posture: Poor  Sitting - Static: Fair;-  Sitting - Dynamic: Poor;+  Standing - Static: Poor;+  Standing - Dynamic: Poor;+  Comments: standing balance assessed with Joan Garnett; able to tolerate 30-60 seconds in fran stedy at a time with min to mod A to maintain.  Tolerated 10 seconds in lift walker with mod A to maintain          Exercises:    Seated LE exercise program: Long Arc Quads, hip abduction/adduction, heel/toe raises, and marches. Reps: 10 x2 sets with one set AROM and one set with one pound weights    Upper extremity exercises: Bicep curl, shoulder flexion/extension, punches, shoulder abduction/adduction. Reps: 10 A-AAROM           AM-PAC Score     AM-PAC Inpatient Mobility without Stair Climbing Raw Score : 8 (01/26/22 1437)  AM-PAC Inpatient without Stair Climbing T-Scale Score : 30.65 (01/26/22 1437)  Mobility Inpatient CMS 0-100% Score: 80.91 (01/26/22 1437)  Mobility Inpatient without Stair CMS G-Code Modifier : CM (01/26/22 1437)       Goals  Short term goals  Time Frame for Short term goals: 14 visits  Short term goal 1: Pt to perform supine LE PRE's AAROM x 15 reps to improve/maintain ROM and strength for transfers.   Short term goal 2: Pt to demonstrate CGA bed mobility  Short term goal 3: Pt to sit <> stand transfer CGA  Short term goal 4: Pt to ambulate 15ft mod A with RW  Short term goal 5: Pt to demonstrate good - seated and fair + standing balance to decrease risk of falls  Patient Goals   Patient goals : None stated    Plan    Plan  Times per week: 5-6x/week  Times per day: Daily  Current Treatment Recommendations: Strengthening,Safety Education & Richardine Joppa Training,Patient/Caregiver Education & Training,Functional Mobility Training,Transfer Training,Gait Training,Neuromuscular Re-education,Wheelchair Mobility Training  Safety Devices  Type of devices: Call light within reach,Chair alarm in place,Gait belt,Left in chair,Nurse notified  Restraints  Initially in place: No     Therapy Time   Individual Concurrent Group Co-treatment   Time In 7672         Time Out 1340         Minutes 55         Timed Code Treatment Minutes: 25 Minutes (co treatment with OT secondary to level of assistance required)       Wiley Alas, PT

## 2022-01-26 NOTE — CARE COORDINATION
Late Entry   Spoke at length late afternoon 1/25 with patient son Gavin Vernon - [place]. Patient is currently residing in assisted living facility where he has been for the past few months after several illnesses. At this time patient is requiring a significant amount of care, seemingly more than what would seem available at the assisted living level, patient is also a \"full-code\" he was recently under hospice care after having COVID - patient recovered from [place] and son wanted patient to receive therapy and had to be discharged from hospice care in order to do so, he states that therapy never started because of a c-dff infection. Discussed with son, that if moving forward, patient was not going to be returning to hospice care that rehab at a skilled nursing facility should be investigated. Gavin Vernon is interested in moving his father to a different facility for skilled care, but lives in [place] and is not going to be arriving in [facility] until Friday this week and is not agreeable to have his dad in a facility that he has not visited. His plan is to have his father [facility] until he has secured a facilty for patient to transition. Writer e-mailed SNF list and requested choices so the referral process could be started. Choices given are  1. [facility]  2. [place]   3. [facility]  4. [address]  If hospice care is pursued he does not want to use [address] and wishes to use [facility].    Referrals sent this am.

## 2022-01-26 NOTE — PLAN OF CARE
Long d/w son Castillo. He is unsure what to do for his father-hospice vs snf/rehab/aggressive care. Updated him that patient does not need gracie but may still need procedure (ercp). After much discussion, we agreed to do mrcp and decide on ercp if indicated. He may however, have already passed a cbd stone. Will start clears and anticipate on snf/rehab this week. If he does not get better with aggressive pt/ot then son will know that it is time for hospice. He does understand patient qualifies for hospice at any point due to malnutrition/ftt.     Godfrey Anderson, DO

## 2022-01-26 NOTE — PROGRESS NOTES
Occupational Therapy  Facility/Department: [facility]  Daily Treatment Note    NAME: Linda Jara  : 1935  MRN: [id number]    Date of Service: 2022     Chief Complaint   Patient presents with    Emesis     cdiff diagnosis 2 weeks ago, started vanco 5 days ago, started vomiting x2 days, worried about dehydration     Discharge Recommendations:  Patient would benefit from continued therapy after discharge     OT Equipment Recommendations  Equipment Needed: No    Assessment   Performance deficits / Impairments: Decreased coordination;Decreased endurance;Decreased ADL status; Decreased posture;Decreased ROM; Decreased balance;Decreased strength;Decreased safe awareness;Decreased cognition;Decreased functional mobility   Assessment: Pt is demonstrating deficits / impairments with Balance, Functional Mobility, ADLs, Strength, and Activity Tolerance. Pt will benefit from ongoing OT services to improve functional participation and independence in order for him to return to prior level of performance. Prognosis: Fair  Decision Making: Medium Complexity  OT Education: Energy Conservation;Transfer Training;ADL Adaptive Strategies;OT Role;Plan of Care;Family Education;Home Exercise Program;Equipment  Barriers to Learning: Pt verbalized / demo'd Marychuy Dates return. REQUIRES OT FOLLOW UP: Yes  Activity Tolerance  Activity Tolerance: Patient limited by fatigue;Treatment limited secondary to decreased cognition  Safety Devices  Safety Devices in place: Yes  Type of devices: All fall risk precautions in place;Call light within reach;Nurse notified; Left in chair;Chair alarm in place;Gait belt  Restraints  Initially in place: No       Patient Diagnosis(es): The primary encounter diagnosis was Acute cholecystitis. Diagnoses of Dehydration and Nausea and vomiting, intractability of vomiting not specified, unspecified vomiting type were also pertinent to this visit.     has a past medical history of A-fib ([facility] [address] CVA (cerebral vascular accident) ([facility] [address] and Myocardial infarct, old.   has a past surgical history that includes Cardioversion (N/A, 9/24/2020). Restrictions  Restrictions/Precautions  Restrictions/Precautions: General Precautions,Contact Precautions,Fall Risk  Required Braces or Orthoses?: No  Implants present? : Metal implants  Position Activity Restriction  Other position/activity restrictions: c-diff contact precautions, up with assist     Subjective   General  Patient assessed for rehabilitation services?: Yes  Family / Caregiver Present: No  Diagnosis: C-Diff  Subjective  Subjective: RN approved Pt to be seen for Eval / Tx. Pt was agreeable and cooperative throughout session. Pain Assessment  Pain Assessment: 0-10  Pain Level: 0  Patient's Stated Pain Goal: No pain  Vital Signs  Patient Currently in Pain: Denies     Objective    ADL  Grooming: Minimal assistance; Increased time to complete;Verbal cueing;Stand by assistance (Seated in recliner chair - SBA hand / face hygiene and Min A brushing hair)  Toileting: Maximum assistance; Increased time to complete  Additional Comments: Pt assisted with participation in [place] (while standing up with Wilene Soles) with Max Assist.  Then participated in grooming tasks w hile seated (Min assist progressing to SBA). Pt required consistent Mod Verbal / Tactile Cues for task initiation / sequencing. Balance  Sitting Balance: Contact guard assistance (Static sitting at EOB with CGA)  Standing Balance: Minimal assistance  Standing Balance  Time: 3 trials of static standing (45-60 seconda each)  Activity: Standing balance during ADL (toilet hygiene / brief management)  Comment: Maintained standing x3 trials with Wilene Soles with Min Assist.  Stood 1 time with Lift Walker with Mod X2 Assist, Pt reported he was unable to continue and returned to sitting. Functional Mobility  Functional Mobility Comments: Did not complete this date - unable to assess.   Terence Calles Steady used for standing and transfer from EOB to Chair. Bed mobility  Supine to Sit: Minimal assistance  Sit to Supine: Minimal assistance  Scooting: Minimal assistance  Comment: With HOB elevated ~60* and with use of Handrail (Left). Min Assist for all bed mobility with Mod Verbal / Tactile Cues for task initiation / sequencing. Transfers  Sit to stand: Moderate assistance  Stand to sit: Moderate assistance  Transfer Comments: Completed Sit to Stand / Stand to Sit with Sofíaromero Lebron, required Mod Assist (x1 person). Required Mod Verbal / Tactile Cues for task initiation / sequencing. Cognition  Overall Cognitive Status: Exceptions  Arousal/Alertness: Delayed responses to stimuli  Following Commands: Follows one step commands with increased time; Follows one step commands with repetition  Attention Span: Attends with cues to redirect  Memory: Decreased short term memory;Decreased recall of recent events;Decreased recall of precautions;Decreased recall of biographical Information  Problem Solving: Assistance required to generate solutions;Assistance required to correct errors made;Assistance required to implement solutions  Insights: Decreased awareness of deficits  Initiation: Requires cues for all  Sequencing: Requires cues for all     Plan   Plan  Times per week: 5-6x/week (Frequency per week was increased this date as Pt is demonstrating progress and improvement in activity tolerance since Evaluation 1 day ago)  Times per day: Daily  Current Treatment Recommendations: Strengthening,Patient/Caregiver Education & Training,Home Management Training,ROM,Equipment Evaluation, Education, & procurement,Balance Training,Positioning,Endurance Training,Safety Education & Training,Self-Care / ADL    AM-[facility] Score  AM-[facility] Inpatient Daily Activity Raw Score: 14 (01/26/22 1435)  AM-PAC Inpatient ADL T-Scale Score : 33.39 (01/26/22 1435)  ADL Inpatient CMS 0-100% Score: 59.67 (01/26/22 1435)  ADL Inpatient CMS G-Code Modifier : CK (01/26/22 9211)    Goals  Short term goals  Time Frame for Short term goals: Within 14 treatment sessions  Short term goal 1: Pt will demo Dynamic Sitting Balance at EOB (~10-12 minuites) with Min Assist while participating in functional BUE activity. Short term goal 2: Pt will participate in Self-feeding / Self-grooming with Min Assist with 50% accurate sequencing. Short term goal 3: Pt will participate in Toilet Transfers with [address] without LOB. Short term goal 4: Pt will maintain Static Standing Tolerance (~3-5 minutes) with Min Assist 1 person.        Therapy Time   Individual Concurrent Group Co-treatment   Time In 1245         Time Out 1319         Minutes 34         Timed Code Treatment Minutes: 23 Minutes (ADL)       PING Hernandez, OTR/L

## 2022-01-27 ENCOUNTER — APPOINTMENT (OUTPATIENT)
Dept: MRI IMAGING | Age: 87
DRG: 438 | End: 2022-01-27
Payer: MEDICARE

## 2022-01-27 LAB
ALBUMIN SERPL-MCNC: 2.1 G/DL (ref 3.5–5.2)
ALBUMIN/GLOBULIN RATIO: 0.6 (ref 1–2.5)
ALP BLD-CCNC: 415 U/L (ref 40–129)
ALT SERPL-CCNC: 134 U/L (ref 5–41)
ANION GAP SERPL CALCULATED.3IONS-SCNC: 8 MMOL/L (ref 9–17)
AST SERPL-CCNC: 122 U/L
BILIRUB SERPL-MCNC: 0.68 MG/DL (ref 0.3–1.2)
BILIRUBIN DIRECT: 0.22 MG/DL
BILIRUBIN, INDIRECT: 0.46 MG/DL (ref 0–1)
BUN BLDV-MCNC: 7 MG/DL (ref 8–23)
CALCIUM SERPL-MCNC: 8 MG/DL (ref 8.6–10.4)
CHLORIDE BLD-SCNC: 105 MMOL/L (ref 98–107)
CO2: 22 MMOL/L (ref 20–31)
CREAT SERPL-MCNC: 0.66 MG/DL (ref 0.7–1.2)
GFR AFRICAN AMERICAN: >60 ML/MIN
GFR NON-AFRICAN AMERICAN: >60 ML/MIN
GFR SERPL CREATININE-BSD FRML MDRD: ABNORMAL ML/MIN/{1.73_M2}
GFR SERPL CREATININE-BSD FRML MDRD: ABNORMAL ML/MIN/{1.73_M2}
GLUCOSE BLD-MCNC: 91 MG/DL (ref 70–99)
HAV IGM SER IA-ACNC: NONREACTIVE
HCT VFR BLD CALC: 32 % (ref 41–53)
HEMOGLOBIN: 10.3 G/DL (ref 13.5–17.5)
HEPATITIS B CORE IGM ANTIBODY: NONREACTIVE
HEPATITIS B SURFACE ANTIGEN: NONREACTIVE
HEPATITIS C ANTIBODY: NONREACTIVE
MCH RBC QN AUTO: 29.8 PG (ref 26–34)
MCHC RBC AUTO-ENTMCNC: 32.3 G/DL (ref 31–37)
MCV RBC AUTO: 92.3 FL (ref 80–100)
NRBC AUTOMATED: ABNORMAL PER 100 WBC
PDW BLD-RTO: 19.7 % (ref 12.5–15.4)
PLATELET # BLD: 158 K/UL (ref 140–450)
PMV BLD AUTO: 7.8 FL (ref 6–12)
POTASSIUM SERPL-SCNC: 3.3 MMOL/L (ref 3.7–5.3)
RBC # BLD: 3.46 M/UL (ref 4.5–5.9)
SODIUM BLD-SCNC: 135 MMOL/L (ref 135–144)
TOTAL PROTEIN: 5.5 G/DL (ref 6.4–8.3)
WBC # BLD: 3.6 K/UL (ref 3.5–11)

## 2022-01-27 PROCEDURE — 2060000000 HC ICU INTERMEDIATE R&B

## 2022-01-27 PROCEDURE — 92610 EVALUATE SWALLOWING FUNCTION: CPT

## 2022-01-27 PROCEDURE — 2580000003 HC RX 258: Performed by: INTERNAL MEDICINE

## 2022-01-27 PROCEDURE — 6360000002 HC RX W HCPCS: Performed by: INTERNAL MEDICINE

## 2022-01-27 PROCEDURE — 74181 MRI ABDOMEN W/O CONTRAST: CPT

## 2022-01-27 PROCEDURE — 6360000002 HC RX W HCPCS: Performed by: NURSE PRACTITIONER

## 2022-01-27 PROCEDURE — 99232 SBSQ HOSP IP/OBS MODERATE 35: CPT | Performed by: INTERNAL MEDICINE

## 2022-01-27 PROCEDURE — 97116 GAIT TRAINING THERAPY: CPT

## 2022-01-27 PROCEDURE — 82248 BILIRUBIN DIRECT: CPT

## 2022-01-27 PROCEDURE — 85027 COMPLETE CBC AUTOMATED: CPT

## 2022-01-27 PROCEDURE — 6370000000 HC RX 637 (ALT 250 FOR IP): Performed by: NURSE PRACTITIONER

## 2022-01-27 PROCEDURE — 99233 SBSQ HOSP IP/OBS HIGH 50: CPT | Performed by: SURGERY

## 2022-01-27 PROCEDURE — 80053 COMPREHEN METABOLIC PANEL: CPT

## 2022-01-27 PROCEDURE — 36415 COLL VENOUS BLD VENIPUNCTURE: CPT

## 2022-01-27 PROCEDURE — 6370000000 HC RX 637 (ALT 250 FOR IP): Performed by: INTERNAL MEDICINE

## 2022-01-27 PROCEDURE — 97535 SELF CARE MNGMENT TRAINING: CPT

## 2022-01-27 RX ORDER — MIRTAZAPINE 15 MG/1
15 TABLET, FILM COATED ORAL NIGHTLY
Status: DISCONTINUED | OUTPATIENT
Start: 2022-01-27 | End: 2022-01-28 | Stop reason: HOSPADM

## 2022-01-27 RX ADMIN — MORPHINE SULFATE 2 MG: 2 INJECTION, SOLUTION INTRAMUSCULAR; INTRAVENOUS at 02:27

## 2022-01-27 RX ADMIN — DEXTROSE AND SODIUM CHLORIDE: 5; 900 INJECTION, SOLUTION INTRAVENOUS at 09:10

## 2022-01-27 RX ADMIN — MIRTAZAPINE 15 MG: 15 TABLET, FILM COATED ORAL at 21:17

## 2022-01-27 RX ADMIN — DEXTROSE AND SODIUM CHLORIDE: 5; 900 INJECTION, SOLUTION INTRAVENOUS at 21:20

## 2022-01-27 RX ADMIN — BUPROPION HYDROCHLORIDE 150 MG: 150 TABLET, FILM COATED, EXTENDED RELEASE ORAL at 21:17

## 2022-01-27 RX ADMIN — VANCOMYCIN HYDROCHLORIDE 125 MG: 125 CAPSULE ORAL at 21:17

## 2022-01-27 RX ADMIN — APIXABAN 5 MG: 5 TABLET, FILM COATED ORAL at 21:17

## 2022-01-27 RX ADMIN — VANCOMYCIN HYDROCHLORIDE 125 MG: 125 CAPSULE ORAL at 09:11

## 2022-01-27 RX ADMIN — LEVOTHYROXINE SODIUM 100 MCG: 50 TABLET ORAL at 06:29

## 2022-01-27 RX ADMIN — POTASSIUM CHLORIDE 20 MEQ: 1500 TABLET, EXTENDED RELEASE ORAL at 09:10

## 2022-01-27 RX ADMIN — FLUOXETINE 40 MG: 20 CAPSULE ORAL at 09:11

## 2022-01-27 RX ADMIN — MORPHINE SULFATE 2 MG: 2 INJECTION, SOLUTION INTRAMUSCULAR; INTRAVENOUS at 23:00

## 2022-01-27 RX ADMIN — MAGNESIUM OXIDE TAB 400 MG (241.3 MG ELEMENTAL MG) 400 MG: 400 (241.3 MG) TAB at 09:10

## 2022-01-27 RX ADMIN — DEXTROSE AND SODIUM CHLORIDE: 5; 900 INJECTION, SOLUTION INTRAVENOUS at 00:16

## 2022-01-27 RX ADMIN — ASPIRIN 81 MG: 81 TABLET, COATED ORAL at 09:10

## 2022-01-27 RX ADMIN — VANCOMYCIN HYDROCHLORIDE 125 MG: 125 CAPSULE ORAL at 19:22

## 2022-01-27 RX ADMIN — FUROSEMIDE 20 MG: 20 TABLET ORAL at 09:10

## 2022-01-27 RX ADMIN — VANCOMYCIN HYDROCHLORIDE 125 MG: 125 CAPSULE ORAL at 13:45

## 2022-01-27 RX ADMIN — LISINOPRIL 40 MG: 20 TABLET ORAL at 09:10

## 2022-01-27 RX ADMIN — METOPROLOL SUCCINATE 50 MG: 50 TABLET, EXTENDED RELEASE ORAL at 09:11

## 2022-01-27 RX ADMIN — PIPERACILLIN AND TAZOBACTAM 3375 MG: 3; .375 INJECTION, POWDER, LYOPHILIZED, FOR SOLUTION INTRAVENOUS at 01:06

## 2022-01-27 RX ADMIN — BUPROPION HYDROCHLORIDE 150 MG: 150 TABLET, FILM COATED, EXTENDED RELEASE ORAL at 09:10

## 2022-01-27 RX ADMIN — PANTOPRAZOLE SODIUM 40 MG: 40 TABLET, DELAYED RELEASE ORAL at 06:29

## 2022-01-27 ASSESSMENT — PAIN DESCRIPTION - FREQUENCY
FREQUENCY: CONTINUOUS
FREQUENCY: CONTINUOUS

## 2022-01-27 ASSESSMENT — PAIN DESCRIPTION - ONSET
ONSET: ON-GOING
ONSET: ON-GOING

## 2022-01-27 ASSESSMENT — PAIN - FUNCTIONAL ASSESSMENT
PAIN_FUNCTIONAL_ASSESSMENT: ACTIVITIES ARE NOT PREVENTED
PAIN_FUNCTIONAL_ASSESSMENT: ACTIVITIES ARE NOT PREVENTED

## 2022-01-27 ASSESSMENT — PAIN DESCRIPTION - PAIN TYPE
TYPE: CHRONIC PAIN
TYPE: CHRONIC PAIN

## 2022-01-27 ASSESSMENT — PAIN DESCRIPTION - ORIENTATION
ORIENTATION: LEFT
ORIENTATION: LOWER;MID

## 2022-01-27 ASSESSMENT — PAIN DESCRIPTION - LOCATION
LOCATION: BACK
LOCATION: HIP

## 2022-01-27 ASSESSMENT — PAIN SCALES - GENERAL
PAINLEVEL_OUTOF10: 4
PAINLEVEL_OUTOF10: 0
PAINLEVEL_OUTOF10: 4
PAINLEVEL_OUTOF10: 0

## 2022-01-27 ASSESSMENT — PAIN DESCRIPTION - PROGRESSION
CLINICAL_PROGRESSION: NOT CHANGED
CLINICAL_PROGRESSION: NOT CHANGED

## 2022-01-27 ASSESSMENT — PAIN DESCRIPTION - DESCRIPTORS
DESCRIPTORS: ACHING
DESCRIPTORS: ACHING

## 2022-01-27 NOTE — PROGRESS NOTES
Occupational Therapy  Facility/Department: [facility] SURG ICU  Daily Treatment Note  NAME: Christian Contreras  : 1935  MRN: [id number]    Date of Service: 2022    Discharge Recommendations:  Patient would benefit from continued therapy after discharge    Assessment   Performance deficits / Impairments: Decreased coordination;Decreased endurance;Decreased ADL status; Decreased posture;Decreased ROM; Decreased balance;Decreased strength;Decreased safe awareness;Decreased cognition;Decreased functional mobility   Assessment: Pt with decreased ADL performance at this time due to above noted deficits, most significantly due to decreased activity tolerance and decreased functional transfers/functional mobility. Pt to benefit from continued therapy services while hospitalized and at discharge to maximize pt's safety and independence in performing functional tasks. Pt currently requires high levels of physical assistance x1-2 during engagement in functional tasks. Prognosis: Good;Fair  Decision Making: Medium Complexity  OT Education: Energy Conservation;Transfer Training;ADL Adaptive Strategies;OT Role;Plan of Care;Equipment;Precautions (Activity Promotion, Safety with Transfers/Use of Timmothy Heck, ADL Tasks; fair return)  REQUIRES OT FOLLOW UP: Yes  Activity Tolerance  Activity Tolerance: Patient limited by fatigue  Safety Devices  Safety Devices in place: Yes  Type of devices: Call light within reach;Nurse notified; Left in chair;Chair alarm in place  Restraints  Initially in place: No         Patient Diagnosis(es): The primary encounter diagnosis was Acute cholecystitis. Diagnoses of Dehydration and Nausea and vomiting, intractability of vomiting not specified, unspecified vomiting type were also pertinent to this visit.       has a past medical history of A-fib [facility]), CVA (cerebral vascular accident) ([facility] [address] and Myocardial infarct, old.   has a past surgical history that includes Cardioversion (N/A, 9/24/2020). Restrictions  Restrictions/Precautions  Restrictions/Precautions: Fall Risk,Isolation  Required Braces or Orthoses?: No  Position Activity Restriction  Other position/activity restrictions: Up with asssistance     Subjective   General  Patient assessed for rehabilitation services?: Yes  Response to previous treatment: Patient reporting fatigue but able to participate  Family / Caregiver Present: No  General Comment  Comments: RN ok'd for therapy this AM. Pt agreeable to participate in session following encouragement, pt pleasant/cooperative throughout. Vital Signs  Patient Currently in Pain: No     Objective    ADL  Grooming: Minimal assistance; Increased time to complete;Verbal cueing (pt seated unsupported at EOB and setup with items to engage in facial hygiene and comb hair)  LE Dressing: Maximum assistance; Increased time to complete (to don socks while supine in bed with HOB raised)  Toileting: Maximum assistance; Increased time to complete (for brief mngt and pericare/bottom hygiene while static standing in [facility])       Balance  Sitting Balance: Minimal assistance (pt with posterior and L lateral lean during sitting balance tasks at EOB and in [facility] requiring CGA-min A to correct)  Standing Balance:  Moderate assistance  Standing Balance  Time: ~45-60 seconds tolerance during 4x bouts of static standing in [facility]  Activity: static standing bouts in [facility]- brief mngt, pericare/bottom hygiene  Comment: Min A-mod A x1 in [facility], pt with high reliance of BUE on [facility] frame; pt with difficulty extending bilateral knees and with forward trunk flexion therefore pt with difficulty reaching a fully upright position; pt reporting fatigue following minimal exertion and stating needing to return to seated EOB to rest, pt required prolonged seated rest breaks between standing bouts    Bed mobility  Supine to Sit: Minimal assistance (assistance required mostly for trunk progression)  Sit to Supine: Moderate assistance (assistance required for BLE progression)  Scooting: Minimal assistance  Comment: Mod verbal/tactile cues for intiation/sequencing/problem solving. Increased time/effort to perform. HOB raised ~45* with significant use of bed rail during supine > sit transfer, HOB flattened during sit > supine transfer. Transfers  Sit to stand: Moderate assistance  Stand to sit: Moderate assistance  Transfer Comments: 4x sit <> stand transfers in [facility] with mod A for 3x attempts and min A for 1x attempt; mod verbal/tactile cues for initiation/sequencing/safety awareness; increased time/effort to perform. Pt dependently transferred bed <> chair in [facility]. Cognition  Overall Cognitive Status: Exceptions  Arousal/Alertness: Delayed responses to stimuli  Following Commands: Follows one step commands with increased time; Follows one step commands with repetition  Attention Span: Attends with cues to redirect  Problem Solving: Assistance required to generate solutions;Assistance required to correct errors made;Assistance required to implement solutions  Insights: Decreased awareness of deficits  Initiation: Requires cues for some  Sequencing: Requires cues for some         Plan   Plan  Times per week: 5-6x/wk  Times per day: Daily  Current Treatment Recommendations: Strengthening,Patient/Caregiver Education & Training,Home Management Training,ROM,Equipment Evaluation, Education, & procurement,Balance Training,Endurance Training,Safety Education & Training,Self-Care / ADL    AM-PAC Score   AM-[facility] Inpatient Daily Activity Raw Score: 15 (01/27/22 1211)  AM-PAC Inpatient ADL T-Scale Score : 34.69 (01/27/22 1211)  ADL Inpatient CMS 0-100% Score: 56.46 (01/27/22 1211)  ADL Inpatient CMS G-Code Modifier : CK (01/27/22 1211)    Goals  Short term goals  Time Frame for Short term goals:  Within 14 treatment sessions  Short term goal 1: Pt will demo Dynamic Sitting Balance at EOB (~10-12 minuites) with Min Assist while participating in functional BUE activity. Short term goal 2: Pt will participate in Self-feeding / Self-grooming with Min Assist with 50% accurate sequencing. Short term goal 3: Pt will participate in Toilet Transfers with [address] without LOB. Short term goal 4: Pt will maintain Static Standing Tolerance (~3-5 minutes) with Min Assist 1 person.        Therapy Time   Individual Concurrent Group Co-treatment   Time In 0217         Time Out 1043; returned 1127 to 1134         Minutes 31 + 7 = 38 minutes total         Timed Code Treatment Minutes: 23 Minutes       MYRIAM Rodgers/L

## 2022-01-27 NOTE — PROGRESS NOTES
[facility] General Surgery Progress Note            PATIENT NAME: Jake Aragon     TODAY'S DATE: 2022, 11:10 AM    Chief Complaint   Patient presents with    Emesis     cdiff diagnosis 2 weeks ago, started vanco 5 days ago, started vomiting x2 days, worried about dehydration       SUBJECTIVE:    Pt seen and examined. No acute events overnight, tolerating CLD although pt reports no real appetite. Denies abdominal pain or N/V.    OBJECTIVE:   Vitals:  /80   Pulse 107   Temp 98.1 °F (36.7 °C) (Oral)   Resp 16   Ht 6' 6\" (1.981 m)   Wt 152 lb 8.9 oz (69.2 kg)   SpO2 97%   BMI 17.63 kg/m²    TEMPERATURE:  Current - Temp: 98.1 °F (36.7 °C); Max - Temp  Av.7 °F (36.5 °C)  Min: 97.5 °F (36.4 °C)  Max: 98.1 °F (36.7 °C)    INTAKE/OUTPUT:      Intake/Output Summary (Last 24 hours) at 2022 1110  Last data filed at 2022 0015  Gross per 24 hour   Intake 970.62 ml   Output --   Net 970.62 ml                 General: awake, alert, NAD  Lungs: Symmetrical chest rise bilaterally, no audible wheezes or rhonchi  Heart: S1S2  Abdomen: Soft, ND, Nontender. Extremity: moves all extremities x4, No edema      Data Review:  CBC:   Recent Labs     22  0524 22  0544 22  0542   WBC 7.2 3.7 3.6   HGB 10.3* 9.9* 10.3*    153 158     BMP:    Recent Labs     22  0524 22  0544 22  0542    134* 135   K 4.3 3.0* 3.3*    101 105   CO2    BUN 19 12 7*   CREATININE 1.05 0.85 0.66*   GLUCOSE 134* 91 91     Hepatic:   Recent Labs     22  0524 22  0544 22  0542   * 205* 122*   * 181* 134*   ALKPHOS 664* 478* 415*   BILITOT 1.67* 0.80 0.68   BILIDIR  --  0.44* 0.22     Coagulation:   Recent Labs     22  1645 22  1645 22  0524 22  0544 22  0542   APTT 29.9  --   --   --   --    PROT 6.5   < > 5.8* 5.4* 5.5*   INR 1.2  --  1.2  --   --     < > = values in this interval not displayed. ASSESSMENT     Patient Active Problem List   Diagnosis    Paroxysmal atrial fibrillation (HCC)    LBBB (left bundle branch block)    Primary hypertension    H/O TIA (transient ischemic attack) and stroke    NSTEMI (non-ST elevated myocardial infarction) ([facility] [address]    Hypothyroid    Mild malnutrition ([facility] [address]    Near syncope    Chronic systolic heart failure (HCC)    Atrial fibrillation, currently in sinus rhythm    Symptomatic bradycardia    Anxiety    Diverticulosis of large intestine    History of Mohs micrographic surgery for skin cancer    Hyperlipidemia    Mild pulmonary hypertension (HCC)    Mild mitral regurgitation    Mild tricuspid regurgitation    Pseudophakia of both eyes    Spinal stenosis of lumbar region    Hypokalemia    Acute biliary pancreatitis without infection or necrosis    Late onset Alzheimer's dementia without behavioral disturbance ([facility] [address]    Severe protein-calorie malnutrition (HCC)    C. difficile colitis     Transaminitis resolving  Abd pain/N/V resolved        PLAN    1. I had a discussion with the patient, he appears calm and responds appropriately to my interview. At this time I do not see an indication for urgent/emergent surgical intervention, pt is scheduled for MRCP later today. He is unsure of whether he would like to pursue elective cholecystectomy, I did explain that his cardiac risks are high, he would need risk stratification prior to elective procedure and would likely need to have this done at a larger center  2. In the context of urgent/emergent cholecystectomy, pt would like to discuss this with his son (apparently arriving in town tomorrow) with regards to whether surgery should be done at that time. 3. OK with continuing to advance diet to low fat, no plans for surgery during this admission unless clinical picture changes or MRCP provides additional significant findings.   4. Discussed with nursing staff and Dr. Marisol Srinivasan    Electronically signed by Elder Zamarripa DO on 1/27/2022 at 11:15 AM

## 2022-01-27 NOTE — PROGRESS NOTES
[facility]  Office: [address], DO, Freddy Syed, DO, Audra Boast, DO, Miguelangel Morenoqian Blood, DO, Amara Bautista MD, Jil Quinteros MD, Vinod Sorensen MD, Flakito Saucedo MD, Nabila Puente MD, Jose Adler MD, Ilana Goins MD, Dany Montaño, DO, Jae Miller, DO, Torri Tavarez MD,  Celio Faith, DO, Philemon Simmonds, MD, Creed Ahumada, MD, Marlow Cowden, MD, Ruth Hu MD, Casimiro Meza MD, Dalia Vergara MD, Hollie Loco MD, [facility], [facility], [facility], [facility], Fior Garcia, CNP, Gaetano Bradley, CNS, Macie Blake, CNP, Franko Plaza, CNP, Cindy Vicente, CNP, Yg Apple, CNP, Pranay Jansen, CNP, Vicky Burgos PA-C, Natacha Bajwa, [facility], Felipe Lowe, [facility], Genoveva Castañeda, CNP, Baldwin Essex, CNP, Don Wheeler, CNP, Cathy Coreas, CNP, Damaris Griffiths, CNP, Gaynelle Pallas, [address]    Progress Note    1/27/2022    8:14 AM    Name:   Kaylee Miller  MRN:     [id number]     Acct:      [de-identified]   Room:   [address] [place] [address] Day:  3  Admit Date:  1/24/2022  4:41 PM    PCP:   Ester Finch MD  Code Status:  Full Code    Subjective:     C/C:   Chief Complaint   Patient presents with    Emesis     cdiff diagnosis 2 weeks ago, started vanco 5 days ago, started vomiting x2 days, worried about dehydration     Interval History Status: improved. Denies abd pain; feels ok  No n/v    Knows he is in [facility] and year, wasn't sure who president was    Brief History:     Kaylee Miller is a 82 y.o. Non- / non  male who presents with Emesis (cdiff diagnosis 2 weeks ago, started vanco 5 days ago, started vomiting x2 days, worried about dehydration)   and is admitted to the hospital for the management of Acute cholecystitis.     This 82 yom has had vomiting and diarrhea off and on since last September-diagnosed and treated for hep A at that time.   Then broke hip and underwent surgery last October-surgery went fairly well but  postop he had significant confusion for about 10 days. He then contracted covid around thanksving and was not doing well; received the monoclonal antibody and recovered after that. When he had covid, he was actually placed on hospice. This has since been revoked and he entered snf for rehab. He now started having vomiting again and was recently diagnosed with c dif and is on po vanco at [facility] for this. He has not been eating for some time now and continues to lose weight. He was transferred from [facility] with ongoing vomiting and poor po intake. Review of Systems:     Constitutional:  negative for chills, fevers, sweats  Respiratory:  negative for cough, dyspnea on exertion, shortness of breath, wheezing  Cardiovascular:  negative for chest pain, chest pressure/discomfort, lower extremity edema, palpitations  Gastrointestinal:  negative for constipation, diarrhea, nausea, vomiting, abdominal pain  Neurological:  negative for dizziness, headache    Medications:      Allergies:  No Known Allergies    Current Meds:   Scheduled Meds:    piperacillin-tazobactam  3,375 mg IntraVENous Q8H    [Held by provider] apixaban  5 mg Oral BID    aspirin  81 mg Oral Daily    [Held by provider] atorvastatin  80 mg Oral Daily    buPROPion  150 mg Oral BID    FLUoxetine  40 mg Oral Daily    furosemide  20 mg Oral Daily    levothyroxine  100 mcg Oral Daily    lisinopril  40 mg Oral Daily    magnesium oxide  400 mg Oral Daily    metoprolol succinate  50 mg Oral Daily    pantoprazole  40 mg Oral QAM AC    potassium chloride  20 mEq Oral Daily with breakfast    vancomycin  125 mg Oral 4x Daily    sodium chloride flush  5-40 mL IntraVENous 2 times per day     Continuous Infusions:    sodium chloride      dextrose 5 % and 0.9 % NaCl 100 mL/hr at 01/27/22 0016     PRN Meds: sodium chloride flush, sodium chloride flush, sodium chloride, potassium chloride, magnesium sulfate, acetaminophen, HYDROcodone 5 mg - acetaminophen **OR** HYDROcodone 5 mg - acetaminophen, HYDROmorphone **OR** HYDROmorphone, ondansetron **OR** ondansetron, morphine **OR** morphine    Data:     Past Medical History:   has a past medical history of A-fib ([facility] CVA (cerebral vascular accident) ([facility] and Myocardial infarct, old. Social History:   reports that he has never smoked. He has never used smokeless tobacco.     Family History:   Family History   Family history unknown: Yes       Vitals:  /84   Pulse 98   Temp 97.6 °F (36.4 °C) (Oral)   Resp 16   Ht 6' 6\" (1.981 m)   Wt 152 lb 8.9 oz (69.2 kg)   SpO2 97%   BMI 17.63 kg/m²   Temp (24hrs), Av.6 °F (36.4 °C), Min:97.5 °F (36.4 °C), Max:97.7 °F (36.5 °C)    Recent Labs     22  2304 22  0456 22  0803   POCGLU 79 76 79       I/O (24Hr): Intake/Output Summary (Last 24 hours) at 2022 0814  Last data filed at 2022 0015  Gross per 24 hour   Intake 970.62 ml   Output --   Net 970.62 ml       Labs:  Hematology:  Recent Labs     22  1645 22  1645 22  0524 22  0544 22  0542   WBC 8.0   < > 7.2 3.7 3.6   RBC 4.21*   < > 3.39* 3.36* 3.46*   HGB 12.6*   < > 10.3* 9.9* 10.3*   HCT 38.0*   < > 31.0* 29.9* 32.0*   MCV 90.2   < > 91.6 88.9 92.3   MCH 30.0   < > 30.4 29.5 29.8   MCHC 33.2   < > 33.2 33.2 32.3   RDW 20.6*   < > 20.4* 20.5* 19.7*      < > 191 153 158   MPV 7.8   < > 7.7 7.7 7.8   INR 1.2  --  1.2  --   --     < > = values in this interval not displayed.      Chemistry:  Recent Labs     22  1645 22  1645 22  0524 22  0544 22  0542      < > 137 134* 135   K 4.5   < > 4.3 3.0* 3.3*      < > 105 101 105   CO2 26   < > 26 26 22   GLUCOSE 66*   < > 134* 91 91   BUN 20   < > 19 12 7*   CREATININE 0.99   < > 1.05 0.85 0.66*   MG  --   --  1.9  --   --    ANIONGAP 10   < > 6* 7* 8*   LABGLOM >60   < > >60 >60 >60   GFRAA >60   < > >60 >60 >60 CALCIUM 9.1   < > 8.3* 8.1* 8.0*   CAION  --   --  1.22  --   --    PHOS  --   --  2.5  --   --    TROPHS 42*  --   --   --   --     < > = values in this interval not displayed. Recent Labs     01/24/22  1645 01/24/22  1645 01/24/22  2304 01/25/22  0456 01/25/22  0524 01/25/22  0803 01/26/22  0544 01/27/22  0542   PROT 6.5   < >  --   --  5.8*  --  5.4* 5.5*   LABALBU 2.7*   < >  --   --  2.3*  --  2.3* 2.1*   *   < >  --   --  475*  --  205* 122*   *   < >  --   --  302*  --  181* 134*   ALKPHOS 825*   < >  --   --  664*  --  478* 415*   BILITOT 2.63*   < >  --   --  1.67*  --  0.80 0.68   BILIDIR  --   --   --   --   --   --  0.44* 0.22   AMYLASE  --   --   --   --  263*  --   --   --    LIPASE 1,277*  --   --   --  564*  --   --   --    TRIG  --   --   --   --  53  --   --   --    POCGLU  --   --  79 76  --  79  --   --     < > = values in this interval not displayed. ABG:No results found for: POCPH, PHART, PH, POCPCO2, AAZ1AEU, PCO2, POCPO2, PO2ART, PO2, POCHCO3, INC7MND, HCO3, NBEA, PBEA, BEART, BE, THGBART, THB, WMB4JIJ, XTYZ1MEH, K0KKEVXQ, O2SAT, FIO2  Lab Results   Component Value Date/Time    SPECIAL 10ML LEFT ARM 01/24/2022 08:09 PM     Lab Results   Component Value Date/Time    CULTURE NO GROWTH 3 DAYS 01/24/2022 08:09 PM       Radiology:  CT HEAD WO CONTRAST    Result Date: 1/24/2022  No acute intracranial abnormality. Senescent changes including chronic microvascular change with advancement since prior exam.     CT CERVICAL SPINE WO CONTRAST    Result Date: 1/24/2022  No acute bony abnormality of the cervical spine on a background of advanced degenerative changes. CT ABDOMEN PELVIS W IV CONTRAST Additional Contrast? None    Result Date: 1/24/2022  Suboptimal study due to patient motion. Significantly distended gallbladder with gallbladder wall thickening. Findings are concerning for acute cholecystitis. For further evaluation right upper quadrant ultrasound is recommended. Interval development of mild intra and extrahepatic biliary dilatation. Apparent stranding within the upper abdomen predominantly surrounding the pancreas. Finding is likely artifactual related to extensive patient motion. However correlation with the patient lab profile is recommended to exclude pancreatitis. No discrete evidence of colitis is noted on the acquired images. Supra iliac infrarenal aortic aneurysm, measuring approximately 2.4 x 3.2 cm. 3 year follow-up is recommended. US GALLBLADDER RUQ    Result Date: 1/25/2022  1. Limited exam. 2.  Limited visualization of the gallbladder demonstrates mild wall thickening, nonspecific. Consider follow-up with hepatobiliary scan to exclude the possibility of acute cholecystitis. 3.  The biliary tree cannot be assessed on this exam. RECOMMENDATIONS: Unavailable     XR CHEST PORTABLE    Result Date: 1/24/2022  No acute cardiopulmonary disease.        Physical Examination:       General appearance:  alert, cooperative and no distress  Mental Status:  oriented to person, place and time and normal affect; couldn't remember president's name  Lungs:  clear to auscultation bilaterally, normal effort  Heart:  regular rate and rhythm, no murmur  Abdomen:  soft, nontender, nondistended, normal bowel sounds, no masses, hepatomegaly, splenomegaly  Extremities:  no edema, redness, tenderness in the calves  Skin:  no gross lesions, rashes, induration    Assessment:     Hospital Problems           Last Modified POA    * (Principal) Acute biliary pancreatitis without infection or necrosis 1/27/2022 Yes    Paroxysmal atrial fibrillation ([address] 1/25/2022 Yes    Primary hypertension 1/25/2022 Yes    Hypothyroid 1/25/2022 Yes    Chronic systolic heart failure ([address] 1/25/2022 Yes    Late onset Alzheimer's dementia without behavioral disturbance ([address] 1/25/2022 Yes    Severe protein-calorie malnutrition ([address] 1/25/2022 Yes    C. difficile colitis 1/25/2022 Yes                Plan: 1. Advance diet today after mrcp  2. Cont po vanco for c dif  3. Dc zosyn-lft trending down and does not have cholangitis  4. Resume eliquis if no procedures needed  5. D/w surgery and son Asim Guerin ([facility]) yesterday who did not want me to speak to his brother Slava Lorenzo  6. Pt/ot  7. Add remeron to help with appetite and depression  8. Holding lipitor due to elevated lft  9.  If ercp not needed, ready for discharge to snf to try therapy; if he does not do well with therapy and does not eat, then son may consider hospice at that time    Rudolph Anderson, DO  1/27/2022  8:14 AM

## 2022-01-27 NOTE — FLOWSHEET NOTE
[address] [facility]  PROGRESS NOTE    Shift date: 1/27/22  Shift day: Thursday   Shift # 1    Room # 331/331-01   Name: Danielle Cochran            Age: 82 y.o. Gender: male          Jewish: Religious    Referral: Initial Visit with Patient in person    Admit Date & Time: 1/24/2022  4:41 PM    PATIENT/EVENT DESCRIPTION:  Danielle Cochran is a 82 y.o. male with whom writer met briefly today in the inpatient setting. SPIRITUAL ASSESSMENT/INTERVENTION:  Mr. Raphael San was lying in bed. He opened his eyes when writer greeted him. He shared that he was doing [place]. \" He shared about the recent loss of his wife. He voiced concerns about his sons. He shared that he was a  for 43 years. He spoke of the place he was born and where he has lived. He named Psalm 80 as his favorite. He was receptive to prayer. Writer introduced herself and her services; inquired about Pt's coping and life; offered empathy and prayer; read Psalm 80; streamed Religious instrumental music on the computer in Pt's room. SPIRITUAL CARE FOLLOW-UP PLAN:  Chaplains will remain available to offer spiritual and emotional support as needed. 01/27/22 1418   Encounter Summary   Services provided to: Patient   Referral/Consult From: Nurse   Support System Family members; Children   Continue Visiting   (1/27/22)   Complexity of Encounter Moderate   Length of Encounter 15 minutes   Spiritual Assessment Completed Yes   Routine   Type Follow up   Spiritual/Adventist   Type Spiritual support   Assessment Calm; Approachable   Intervention Active listening;Explored feelings, thoughts, concerns;Explored coping resources;Sustaining presence/ Ministry of presence   Outcome Expressed gratitude;Coping;Receptive; Expressed feelings/needs/concerns       Electronically signed by Manolo Villarreal on 1/27/2022 at 2:21 PM.  [address]  [phone]

## 2022-01-27 NOTE — PROGRESS NOTES
Speech Language Pathology  Facility/Department: [facility]   CLINICAL BEDSIDE SWALLOW EVALUATION    NAME: Gi Medrano  : 1935  MRN: [id number]    ADMISSION DATE: 2022  ADMITTING DIAGNOSIS: has Paroxysmal atrial fibrillation (HCC); LBBB (left bundle branch block); Primary hypertension; H/O TIA (transient ischemic attack) and stroke; NSTEMI (non-ST elevated myocardial infarction) ([address] Hypothyroid; Mild malnutrition ([address] Near syncope; Chronic systolic heart failure ([address] Atrial fibrillation, currently in sinus rhythm; Symptomatic bradycardia; Anxiety; Diverticulosis of large intestine; History of Mohs micrographic surgery for skin cancer; Hyperlipidemia; Mild pulmonary hypertension ([address] Mild mitral regurgitation; Mild tricuspid regurgitation; Pseudophakia of both eyes; Spinal stenosis of lumbar region; Hypokalemia; Acute biliary pancreatitis without infection or necrosis; Late onset Alzheimer's dementia without behavioral disturbance ([address] Severe protein-calorie malnutrition ([address] and C. difficile colitis on their problem list.    Recent Chest Xray/CT of Chest: 2022    Impression   No acute cardiopulmonary disease. Date of Eval: 2022  Evaluating Therapist: ANDERS Bliss    Current Diet level:  Current Diet : NPO  Current Liquid Diet : Clear    Primary Complaint  Gi Medrano is a 82 y.o. Non- / non  male who presents with Emesis (cdiff diagnosis 2 weeks ago, started vanco 5 days ago, started vomiting x2 days, worried about dehydration)   and is admitted to the hospital for the management of Acute cholecystitis.     This 82 yom has had vomiting and diarrhea off and on since last September-diagnosed and treated for hep A at that time. Then broke hip and underwent surgery last October-surgery went fairly well but  postop he had significant confusion for about 10 days.   He then contracted covid around thanksgiving and was not doing well; received the monoclonal antibody and recovered after that. When he had covid, he was actually placed on hospice. This has since been revoked and he entered snf for rehab. He now started having vomiting again and was recently diagnosed with c dif and is on po vanco at [facility] for this. He has not been eating for some time now and continues to lose weight. He was transferred from [facility] with ongoing vomiting and poor po intake. Pain:  Pain Assessment  Pain Assessment: 0-10  Pain Level: 4    Reason for Referral  Maximiliano Slater was referred for a bedside swallow evaluation to assess the efficiency of his swallow function, identify signs and symptoms of aspiration and make recommendations regarding safe dietary consistencies, effective compensatory strategies, and safe eating environment. Impression  Patient presents with probable safe swallow for Dysphagia soft and bite/sized (Dysphagia III) diet (NO mixed consistencies) with Moderately Thick (Honey) liquids by cup as evidenced by no overt s/s of aspiration noted with consistencies tested. +latent cough and throat clear w/ mildly-thick, thin, and x1/3 trials of soft solids. Recommend [facility] protocol and ice chips PRN (with clean oral cavity). Recommend small sips and bites, only feed when alert and awake and upright at 90 degrees for all PO intake. ST unable to objectively r/o aspiration at bedside. Recommend close monitoring for overt/clinical s/s of aspiration and D/C PO intake and complete Modified Barium Swallow Study should they occur. Results and recommendations reported to RN. *Per chart, discussions of aggressive therapies vs. Hospice have taken place w/ pt, family, and medical team. Should pt wish to pursue hospice, recommend Easy to Chew diet with thin liquids (as pt wishes) with small, single sips from a cup to maximize QoL and comfort.  Should pt pursue aggressive therapies, recommend above diet to facilitate prevention of aspiration/aspiration PNA. If pt demonstrates overt s/s of aspiration, recommend MBSS to r/o/confirm aspiration. Dysphagia Diagnosis: Mild oral stage dysphagia; Moderate pharyngeal stage dysphagia  Dysphagia Outcome Severity Scale: Level 3: Moderate dysphagia- Total assisstance, supervision or strategies. Two or more diet consistencies restricted     Treatment Plan  Requires SLP Intervention: Yes  Duration/Frequency of Treatment: 2-3x per week  D/C Recommendations: Further therapy recommended at discharge. Recommended Diet and Intervention  Diet Solids Recommendation: Dysphagia Soft and Bite-Sized (Dysphagia III)  Liquid Consistency Recommendation: Moderately Thick (Honey)  Recommended Form of Meds: Meds in puree  Recommendations: Modified barium swallow study;Consider ice chips PRN;Consider alternative nutrition;Dysphagia treatment  Therapeutic Interventions: Diet tolerance monitoring;Patient/Family education;Oral motor exercises    Compensatory Swallowing Strategies  Compensatory Swallowing Strategies: Upright as possible for all oral intake;Small bites/sips    Treatment/Goals  Dysphagia Goals: The patient will tolerate recommended diet without observed clinical signs of aspiration    General  Chart Reviewed: Yes  Behavior/Cognition: Alert; Cooperative  Temperature Spikes Noted: No  Respiratory Status: Room air  O2 Device: None (Room air)  Communication Observation: Functional  Follows Directions: Simple  Dentition: Some missing teeth  Patient Positioning: Upright in bed  Baseline Vocal Quality: Normal  Consistencies Administered: Dysphagia Soft and Bite-Sized (Dysphagia III); Reg solid; Dysphagia Pureed (Dysphagia I); Thin - cup; Thin - straw;Nectar - straw;Nectar - cup;Honey - teaspoon    Vision/Hearing  Vision  Vision: Impaired  Vision Exceptions: Wears glasses at all times  Hearing  Hearing: Exceptions to [facility]  Hearing Exceptions: Hard of hearing/hearing concerns    Oral Motor Deficits  Oral/Motor  Oral Motor: Within functional limits    Oral Phase Dysfunction  Oral Phase  Oral Phase: WFL  Oral Phase  Oral Phase - Comment: +extended mastication with soft and regular solid; however oral transit and bolus manipulation appear WFL for all consistencies tested     Indicators of Pharyngeal Phase Dysfunction   Pharyngeal Phase  Pharyngeal Phase: WFL  Pharyngeal Phase   Pharyngeal: Pt w/ +latent throat clear and cough following trials of thin liquids by cup and straw; mildly-thick liquids by cup and straw; and x1/3 trials of soft solids (likely d/t liquid juice). No overt s/s of aspiration with puree, regular solids, or moderately-thick liquids.     Prognosis  Prognosis  Prognosis for safe diet advancement: fair  Individuals consulted  Consulted and agree with results and recommendations: Patient;RN    Education  Patient Education: yes  Patient Education Response: Verbalizes understanding       Therapy Time  SLP Individual Minutes  Time In: 7160  Time Out: 3672  Minutes: [address], SLP  1/27/2022 8:18 AM

## 2022-01-27 NOTE — PLAN OF CARE
Problem: Skin Integrity:  Goal: Will show no infection signs and symptoms  Description: Will show no infection signs and symptoms  Outcome: Ongoing   No new skin breakdown noted, no signs/symptoms of infection, continue to monitor labwork including WBC, medications ordered   Problem: Falls - Risk of:  Goal: Will remain free from falls  Description: Will remain free from falls  Outcome: Ongoing   No falls/injuries this shift, bed in lowest position, brakes on, bed alarm on, call light in reach, side rails up x2   Problem: Musculor/Skeletal Functional Status  Goal: Highest potential functional level  Outcome: Ongoing   Pt actively participates in ADL's as able & at times with prompting

## 2022-01-27 NOTE — PROGRESS NOTES
Comprehensive Nutrition Assessment    Type and Reason for Visit:  Initial,Positive Nutrition Screen    Nutrition Recommendations/Plan: Continue current diet as clinically appropriate. Head of bed at 90 degrees during feeding. Assistance with feeding as needed. Ensure Enlive, honey thick TID. Nutrition Assessment:  Pt severly malnourish upon admission related documented failure to thrive and hx of advanced dementia. Pt admited for n/v and hx of c-diff with recent complex medical care. Plan was originally for hospice (1/24) then retracted with surgical eval for gracie. SLP eval wih recommendation for honey thick and soft and bite sized meals. Pt receptive to Ensure wiht meals. Pt poor historian, did recognize recent weight loss though unsure of how much. Surgical plan is conservative at this time with potential later consideration for hospice. Malnutrition Assessment:  Malnutrition Status:  Severe malnutrition    Context:  Chronic Illness     Findings of the 6 clinical characteristics of malnutrition:  Energy Intake:  7 - 75% or less estimated energy requirements for 1 month or longer  Weight Loss:  Unable to assess     Body Fat Loss:  7 - Severe body fat loss Orbital,Triceps,Buccal region   Muscle Mass Loss:  7 - Severe muscle mass loss Temples (temporalis),Clavicles (pectoralis & deltoids),Scapula (trapezius)  Fluid Accumulation:  Unable to assess     Strength:  Not Performed    Estimated Daily Nutrient Needs:  Energy (kcal):  2540 kcal/day; Weight Used for Energy Requirements:     Current weight   Protein (g):  103-138; Weight Used for Protein Requirements:         Current weight  Fluid (ml/day):  0539-0972 ml; Method Used for Fluid Requirements:  ml/Kg      Nutrition Related Findings:  Hypokalemia- sliding scale orderd. Elevated liver enzymes        Current Nutrition Therapies:    ADULT DIET; Dysphagia - Soft and Bite Sized; Low Fat (less than or equal to 50 gm/day);  Moderately Thick (Honey)    Anthropometric Measures:  · Height: 6' 6\" (198.1 cm)  · Current Body Weight: 152 lb (68.9 kg)   · Admission Body Weight: 145 lb (65.8 kg)    · Usual Body Weight:       · Ideal Body Weight: 214 lbs;   · BMI: 17.6    · BMI Categories: Underweight (BMI less than 22) age over 72       Nutrition Diagnosis:   · Severe malnutrition,In context of chronic illness related to cognitive or neurological impairment as evidenced by NPO or clear liquid status due to medical condition,weight loss,severe loss of subcutaneous fat,severe muscle loss,lab values,vomiting,nausea      Nutrition Interventions:   Food and/or Nutrient Delivery:  Continue Current Diet,Start Oral Nutrition Supplement  Nutrition Education/Counseling:  Education not indicated,No recommendation at this time   Coordination of Nutrition Care:  Continue to monitor while inpatient,Speech Therapy,Coordination of Community Care,Interdisciplinary Rounds    Goals:  PO intakes >50% of meals       Nutrition Monitoring and Evaluation:   Behavioral-Environmental Outcomes:  None Identified   Food/Nutrient Intake Outcomes:  Diet Advancement/Tolerance,Food and Nutrient Intake,Supplement Intake,IVF Intake  Physical Signs/Symptoms Outcomes:  Chewing or Swallowing,Nausea or Vomiting,GI Status,Meal Time Behavior,Nutrition Focused Physical Findings,Weight,Biochemical Data     Discharge Planning:    Continue Oral Nutrition Supplement,Too soon to determine     Electronically signed by Jonel Echevarria RD, LD on 1/27/22 at 12:21 PM EST    Contact: YASIR Brantd, NIKUNJ, LD  Registered Dietitian  [facility] ([facility]) [address] [place] [address]  [phone]

## 2022-01-27 NOTE — PLAN OF CARE
Problem: Skin Integrity:  Goal: Will show no infection signs and symptoms  Description: Will show no infection signs and symptoms  1/27/2022 0941 by Madie Cooney RN  Outcome: Ongoing  1/27/2022 0053 by Frank Dillon RN  Outcome: Ongoing  Goal: Absence of new skin breakdown  Description: Absence of new skin breakdown  1/27/2022 0941 by Mdaie Cooney RN  Outcome: Ongoing  1/27/2022 0053 by Frank Dillon RN  Outcome: Ongoing     Problem: Falls - Risk of:  Goal: Will remain free from falls  Description: Will remain free from falls  1/27/2022 0941 by Madie Cooney RN  Outcome: Ongoing  1/27/2022 0053 by Frank Dillon RN  Outcome: Ongoing  Goal: Absence of physical injury  Description: Absence of physical injury  1/27/2022 0941 by Madie Cooney RN  Outcome: Ongoing  1/27/2022 0053 by Frank Dillon RN  Outcome: Ongoing     Problem: Musculor/Skeletal Functional Status  Goal: Highest potential functional level  1/27/2022 0941 by Madie Cooney RN  Outcome: Ongoing  1/27/2022 0053 by Frank Dillon RN  Outcome: Ongoing  Goal: Absence of falls  1/27/2022 0941 by Madie Cooney RN  Outcome: Ongoing  1/27/2022 0053 by Frank Dillon RN  Outcome: Ongoing

## 2022-01-27 NOTE — CARE COORDINATION
[facility] ICU Quality Flow/Interdisciplinary Rounds Progress Note    Quality Flow Rounds held on January 27, 2022 at [address] Attending:  Bedside Nurse, , Nursing Unit Leadership, Dietary, Respiratory Therapy, Physical Therapy, Occupational Therapy, Spiritual Care/ and Physician    Anticipated Discharge Date:  Expected Discharge Date: 01/29/22    Anticipated Discharge Disposition:    Readmission Risk              Risk of Unplanned Readmission:  23           Discussed patient goal for the day, patient clinical progression, and barriers to discharge. The following Goal(s) of the Day/Commitment(s) have been identified:  Activity Progression  PT/OT/ST, Choice - Obtain Post-Acute Preference(s), Diagnostics - Report Results and MRCP today - await result or plan      Chandra Klinefelter, RN  January 27, 2022      1010 call to patient son Asim Guerin to update with SNF replies, discussed that if no intervention is needed that patient will be ready for discharge to SNF.  He will be flying in tomorrow from [place] to visit facility, call to Naty at [facility] to have her call and speak with son about facilities, left message

## 2022-01-27 NOTE — PROGRESS NOTES
Physical Therapy  Facility/Department: [facility] SURG ICU  Daily Treatment Note  NAME: Srikanth Winters  : 1935  MRN: [id number]    Date of Service: 2022    Discharge Recommendations:  Patient would benefit from continued therapy after discharge   PT Equipment Recommendations  Equipment Needed: No  Other: Continue to assess pending improvement with mobility    Assessment   Body structures, Functions, Activity limitations: Decreased functional mobility ; Decreased balance;Decreased strength;Decreased safe awareness;Decreased endurance  Assessment: Pt with improved alertness and mobility this date. Pt was able to stand with mod Ax1 with fran stedy x 3 reps; bed mobility Min assist. Pt would be unsafe to return to his prior living arrangement and would benefit from further therapy at discharge to increase his safety and independence with mobility. Treatment Diagnosis: dec mobility  Prognosis: Fair  PT Education: Transfer Training;Functional Mobility Training  REQUIRES PT FOLLOW UP: Yes  Activity Tolerance  Activity Tolerance: Patient limited by fatigue;Patient limited by endurance  Activity Tolerance: Pt fatigues quickly. Patient Diagnosis(es): The primary encounter diagnosis was Acute cholecystitis. Diagnoses of Dehydration and Nausea and vomiting, intractability of vomiting not specified, unspecified vomiting type were also pertinent to this visit. has a past medical history of A-fib [facility]), CVA (cerebral vascular accident) ([facility] [address] and Myocardial infarct, old.   has a past surgical history that includes Cardioversion (N/A, 2020).     Restrictions  Restrictions/Precautions  Restrictions/Precautions: General Precautions,Contact Precautions,Fall Risk  Required Braces or Orthoses?: No  Implants present? : Metal implants  Position Activity Restriction  Other position/activity restrictions: c-diff contact precautions, up with assist  Subjective   General  Response To Previous Treatment: Patient with no complaints from previous session. Family / Caregiver Present: No  Subjective  Subjective: Pt supine in bed and agreeable to therapy. Pt denies any pain. Pt more alert and conversive today. Pain Screening  Patient Currently in Pain: No  Pain Assessment  Pain Assessment: 0-10  Pain Level: 0  Vital Signs  Patient Currently in Pain: No       Orientation  Orientation  Orientation Level: Oriented to person  Cognition      Objective   Bed mobility  Supine to Sit: Minimal assistance  Scooting: Minimal assistance  Comment: HOB elevated ~50 degrees and use of handrail. Pt with improved ability to bring legs to edge of bed and bring trunk to upright. Pt with (L) lateral lean in sitting with ability to correct but not for extended periods of time. Transfers  Sit to Stand: Moderate Assistance  Stand to sit: Moderate Assistance  Stand Pivot Transfers: Unable to assess  Comment: stood with fran sharmamarta 3x (two times up to 30-45 seconds for brief change, third time to place paddles) and transferred to chair via [address]?: No  Stairs/Curb  Stairs?: No     Balance  Posture: Poor  Sitting - Static: Fair;-  Sitting - Dynamic: Poor;+  Standing - Static: Poor;+  Standing - Dynamic: Poor;+  Exercises  Hip Flexion: 10x (B) LE, AAROM (L) LE  Ankle Pumps: 10x (B) LE                        G-Code     OutComes Score                                                     AM-PAC Score     AM-PAC Inpatient Mobility without Stair Climbing Raw Score : 7 (01/25/22 1229)  AM-PAC Inpatient without Stair Climbing T-Scale Score : 28.66 (01/25/22 1229)  Mobility Inpatient CMS 0-100% Score: 86.29 (01/25/22 1229)  Mobility Inpatient without Stair CMS G-Code Modifier : CM (01/25/22 1229)       Goals  Short term goals  Time Frame for Short term goals: 14 visits  Short term goal 1: Pt to perform supine LE PRE's AAROM x 15 reps to improve/maintain ROM and strength for transfers.   Short term goal 2: Pt to demonstrate CGA bed mobility  Short term goal 3: Pt to sit <> stand transfer CGA  Short term goal 4: Pt to ambulate 15ft mod A with RW  Short term goal 5: Pt to demonstrate good - seated and fair + standing balance to decrease risk of falls  Patient Goals   Patient goals : None stated    Plan    Plan  Times per week: 5-6x/week  Times per day: Daily  Current Treatment Recommendations: Strengthening,Safety Education & Bryce Harry Training,Patient/Caregiver Education & Training,Functional Mobility Training,Transfer Training,Gait Training,Neuromuscular Re-education,Wheelchair Mobility Training  Safety Devices  Type of devices: Call light within reach,Chair alarm in place,Gait belt,Left in chair,Nurse notified  Restraints  Initially in place: No     Therapy Time   Individual Concurrent Group Co-treatment   Time In 1014         Time Out 1039         Minutes 25         Timed Code Treatment Minutes: [address], PT

## 2022-01-27 NOTE — PLAN OF CARE
Nutrition Problem #1: Severe malnutrition,In context of chronic illness  Intervention: Food and/or Nutrient Delivery: Continue Current Diet,Start Oral Nutrition Supplement  Nutritional Goals: PO intakes >50% of meals

## 2022-01-28 VITALS
TEMPERATURE: 98.2 F | HEART RATE: 87 BPM | HEIGHT: 78 IN | OXYGEN SATURATION: 98 % | RESPIRATION RATE: 16 BRPM | DIASTOLIC BLOOD PRESSURE: 89 MMHG | SYSTOLIC BLOOD PRESSURE: 129 MMHG | WEIGHT: 152.56 LBS | BODY MASS INDEX: 17.65 KG/M2

## 2022-01-28 LAB
ALBUMIN SERPL-MCNC: 2 G/DL (ref 3.5–5.2)
ALBUMIN/GLOBULIN RATIO: 0.6 (ref 1–2.5)
ALP BLD-CCNC: 381 U/L (ref 40–129)
ALT SERPL-CCNC: 105 U/L (ref 5–41)
ANION GAP SERPL CALCULATED.3IONS-SCNC: 8 MMOL/L (ref 9–17)
AST SERPL-CCNC: 79 U/L
BILIRUB SERPL-MCNC: 0.61 MG/DL (ref 0.3–1.2)
BILIRUBIN DIRECT: 0.3 MG/DL
BILIRUBIN, INDIRECT: 0.31 MG/DL (ref 0–1)
BUN BLDV-MCNC: 3 MG/DL (ref 8–23)
CALCIUM SERPL-MCNC: 8.2 MG/DL (ref 8.6–10.4)
CHLORIDE BLD-SCNC: 104 MMOL/L (ref 98–107)
CO2: 26 MMOL/L (ref 20–31)
CREAT SERPL-MCNC: 0.69 MG/DL (ref 0.7–1.2)
GFR AFRICAN AMERICAN: >60 ML/MIN
GFR NON-AFRICAN AMERICAN: >60 ML/MIN
GFR SERPL CREATININE-BSD FRML MDRD: ABNORMAL ML/MIN/{1.73_M2}
GFR SERPL CREATININE-BSD FRML MDRD: ABNORMAL ML/MIN/{1.73_M2}
GLUCOSE BLD-MCNC: 92 MG/DL (ref 70–99)
HCT VFR BLD CALC: 30.6 % (ref 41–53)
HEMOGLOBIN: 10.3 G/DL (ref 13.5–17.5)
MCH RBC QN AUTO: 29.7 PG (ref 26–34)
MCHC RBC AUTO-ENTMCNC: 33.6 G/DL (ref 31–37)
MCV RBC AUTO: 88.4 FL (ref 80–100)
NRBC AUTOMATED: ABNORMAL PER 100 WBC
PDW BLD-RTO: 19.4 % (ref 12.5–15.4)
PLATELET # BLD: 172 K/UL (ref 140–450)
PMV BLD AUTO: 7.3 FL (ref 6–12)
POTASSIUM SERPL-SCNC: 2.8 MMOL/L (ref 3.7–5.3)
POTASSIUM SERPL-SCNC: 3.1 MMOL/L (ref 3.7–5.3)
POTASSIUM SERPL-SCNC: 3.1 MMOL/L (ref 3.7–5.3)
RBC # BLD: 3.46 M/UL (ref 4.5–5.9)
SARS-COV-2, RAPID: NOT DETECTED
SODIUM BLD-SCNC: 138 MMOL/L (ref 135–144)
SPECIMEN DESCRIPTION: NORMAL
TOTAL PROTEIN: 5.4 G/DL (ref 6.4–8.3)
WBC # BLD: 3 K/UL (ref 3.5–11)

## 2022-01-28 PROCEDURE — 87635 SARS-COV-2 COVID-19 AMP PRB: CPT

## 2022-01-28 PROCEDURE — 2500000003 HC RX 250 WO HCPCS: Performed by: NURSE PRACTITIONER

## 2022-01-28 PROCEDURE — 97535 SELF CARE MNGMENT TRAINING: CPT

## 2022-01-28 PROCEDURE — 6370000000 HC RX 637 (ALT 250 FOR IP): Performed by: NURSE PRACTITIONER

## 2022-01-28 PROCEDURE — 84132 ASSAY OF SERUM POTASSIUM: CPT

## 2022-01-28 PROCEDURE — 85027 COMPLETE CBC AUTOMATED: CPT

## 2022-01-28 PROCEDURE — 82248 BILIRUBIN DIRECT: CPT

## 2022-01-28 PROCEDURE — 97530 THERAPEUTIC ACTIVITIES: CPT

## 2022-01-28 PROCEDURE — 97110 THERAPEUTIC EXERCISES: CPT

## 2022-01-28 PROCEDURE — 80053 COMPREHEN METABOLIC PANEL: CPT

## 2022-01-28 PROCEDURE — 99239 HOSP IP/OBS DSCHRG MGMT >30: CPT | Performed by: INTERNAL MEDICINE

## 2022-01-28 PROCEDURE — 36415 COLL VENOUS BLD VENIPUNCTURE: CPT

## 2022-01-28 PROCEDURE — 6360000002 HC RX W HCPCS: Performed by: NURSE PRACTITIONER

## 2022-01-28 PROCEDURE — 6370000000 HC RX 637 (ALT 250 FOR IP): Performed by: INTERNAL MEDICINE

## 2022-01-28 PROCEDURE — 2580000003 HC RX 258: Performed by: NURSE PRACTITIONER

## 2022-01-28 RX ORDER — POTASSIUM CHLORIDE 20 MEQ/1
20 TABLET, EXTENDED RELEASE ORAL
Status: DISCONTINUED | OUTPATIENT
Start: 2022-01-28 | End: 2022-01-28 | Stop reason: HOSPADM

## 2022-01-28 RX ORDER — POTASSIUM CHLORIDE 20 MEQ/1
40 TABLET, EXTENDED RELEASE ORAL ONCE
Status: COMPLETED | OUTPATIENT
Start: 2022-01-28 | End: 2022-01-28

## 2022-01-28 RX ORDER — LISINOPRIL 40 MG/1
40 TABLET ORAL DAILY
Qty: 30 TABLET | Refills: 3 | DISCHARGE
Start: 2022-01-28

## 2022-01-28 RX ORDER — OMEPRAZOLE 20 MG/1
20 CAPSULE, DELAYED RELEASE ORAL DAILY
Qty: 30 CAPSULE | Refills: 3 | DISCHARGE
Start: 2022-01-28

## 2022-01-28 RX ORDER — VANCOMYCIN HYDROCHLORIDE 125 MG/1
125 CAPSULE ORAL 4 TIMES DAILY
Qty: 40 CAPSULE | Refills: 0 | DISCHARGE
Start: 2022-01-28 | End: 2022-02-07

## 2022-01-28 RX ORDER — MIRTAZAPINE 15 MG/1
15 TABLET, FILM COATED ORAL NIGHTLY
Qty: 30 TABLET | Refills: 3 | DISCHARGE
Start: 2022-01-28

## 2022-01-28 RX ORDER — ASPIRIN 81 MG/1
81 TABLET, COATED ORAL DAILY
Qty: 30 TABLET | Refills: 3 | DISCHARGE
Start: 2022-01-28

## 2022-01-28 RX ORDER — METOPROLOL SUCCINATE 50 MG/1
50 TABLET, EXTENDED RELEASE ORAL DAILY
Qty: 30 TABLET | Refills: 3 | DISCHARGE
Start: 2022-01-28

## 2022-01-28 RX ORDER — DEXTROSE, SODIUM CHLORIDE, AND POTASSIUM CHLORIDE 5; .9; .15 G/100ML; G/100ML; G/100ML
INJECTION INTRAVENOUS CONTINUOUS
Status: DISCONTINUED | OUTPATIENT
Start: 2022-01-28 | End: 2022-01-28 | Stop reason: HOSPADM

## 2022-01-28 RX ORDER — LEVOTHYROXINE SODIUM 0.1 MG/1
100 TABLET ORAL DAILY
Qty: 30 TABLET | Refills: 3 | DISCHARGE
Start: 2022-01-28

## 2022-01-28 RX ORDER — POTASSIUM CHLORIDE 20 MEQ/1
20 TABLET, EXTENDED RELEASE ORAL 2 TIMES DAILY
Qty: 60 TABLET | Refills: 3 | DISCHARGE
Start: 2022-01-28

## 2022-01-28 RX ORDER — FLUOXETINE HYDROCHLORIDE 40 MG/1
40 CAPSULE ORAL DAILY
Qty: 30 CAPSULE | Refills: 3 | DISCHARGE
Start: 2022-01-28

## 2022-01-28 RX ORDER — POTASSIUM CHLORIDE 20 MEQ/1
20 TABLET, EXTENDED RELEASE ORAL DAILY
Qty: 60 TABLET | Refills: 3 | DISCHARGE
Start: 2022-01-28 | End: 2022-01-28 | Stop reason: SDUPTHER

## 2022-01-28 RX ADMIN — FUROSEMIDE 20 MG: 20 TABLET ORAL at 07:47

## 2022-01-28 RX ADMIN — ASPIRIN 81 MG: 81 TABLET, COATED ORAL at 07:47

## 2022-01-28 RX ADMIN — BUPROPION HYDROCHLORIDE 150 MG: 150 TABLET, FILM COATED, EXTENDED RELEASE ORAL at 07:47

## 2022-01-28 RX ADMIN — METOPROLOL SUCCINATE 50 MG: 50 TABLET, EXTENDED RELEASE ORAL at 07:48

## 2022-01-28 RX ADMIN — LEVOTHYROXINE SODIUM 100 MCG: 50 TABLET ORAL at 06:06

## 2022-01-28 RX ADMIN — POTASSIUM CHLORIDE 40 MEQ: 1500 TABLET, EXTENDED RELEASE ORAL at 07:47

## 2022-01-28 RX ADMIN — BUPROPION HYDROCHLORIDE 150 MG: 150 TABLET, FILM COATED, EXTENDED RELEASE ORAL at 20:07

## 2022-01-28 RX ADMIN — POTASSIUM CHLORIDE 10 MEQ: 7.46 INJECTION, SOLUTION INTRAVENOUS at 07:47

## 2022-01-28 RX ADMIN — LISINOPRIL 40 MG: 20 TABLET ORAL at 07:47

## 2022-01-28 RX ADMIN — POTASSIUM CHLORIDE, DEXTROSE MONOHYDRATE AND SODIUM CHLORIDE: 150; 5; 900 INJECTION, SOLUTION INTRAVENOUS at 08:58

## 2022-01-28 RX ADMIN — POTASSIUM CHLORIDE 20 MEQ: 1500 TABLET, EXTENDED RELEASE ORAL at 07:48

## 2022-01-28 RX ADMIN — APIXABAN 5 MG: 5 TABLET, FILM COATED ORAL at 20:08

## 2022-01-28 RX ADMIN — MAGNESIUM OXIDE TAB 400 MG (241.3 MG ELEMENTAL MG) 400 MG: 400 (241.3 MG) TAB at 07:48

## 2022-01-28 RX ADMIN — FLUOXETINE 40 MG: 20 CAPSULE ORAL at 07:48

## 2022-01-28 RX ADMIN — POTASSIUM CHLORIDE 40 MEQ: 1500 TABLET, EXTENDED RELEASE ORAL at 13:49

## 2022-01-28 RX ADMIN — POTASSIUM CHLORIDE 10 MEQ: 7.46 INJECTION, SOLUTION INTRAVENOUS at 06:06

## 2022-01-28 RX ADMIN — VANCOMYCIN HYDROCHLORIDE 125 MG: 125 CAPSULE ORAL at 13:48

## 2022-01-28 RX ADMIN — MIRTAZAPINE 15 MG: 15 TABLET, FILM COATED ORAL at 20:08

## 2022-01-28 RX ADMIN — VANCOMYCIN HYDROCHLORIDE 125 MG: 125 CAPSULE ORAL at 20:07

## 2022-01-28 RX ADMIN — PANTOPRAZOLE SODIUM 40 MG: 40 TABLET, DELAYED RELEASE ORAL at 06:06

## 2022-01-28 RX ADMIN — SODIUM CHLORIDE, PRESERVATIVE FREE 10 ML: 5 INJECTION INTRAVENOUS at 07:47

## 2022-01-28 RX ADMIN — VANCOMYCIN HYDROCHLORIDE 125 MG: 125 CAPSULE ORAL at 07:47

## 2022-01-28 RX ADMIN — APIXABAN 5 MG: 5 TABLET, FILM COATED ORAL at 07:47

## 2022-01-28 ASSESSMENT — PAIN DESCRIPTION - PROGRESSION
CLINICAL_PROGRESSION: NOT CHANGED

## 2022-01-28 ASSESSMENT — PAIN SCALES - GENERAL
PAINLEVEL_OUTOF10: 0
PAINLEVEL_OUTOF10: 0

## 2022-01-28 NOTE — PROGRESS NOTES
Occupational Therapy  Facility/Department: [facility] ICU  Daily Treatment Note  NAME: Hiren Golden  : 1935  MRN: [id number]    Date of Service: 2022    Discharge Recommendations:  Patient would benefit from continued therapy after discharge       Assessment   Performance deficits / Impairments: Decreased coordination;Decreased endurance;Decreased ADL status; Decreased posture;Decreased ROM; Decreased balance;Decreased strength;Decreased safe awareness;Decreased cognition;Decreased functional mobility  Assessment: Pt with decreased ADL performance at this time due to above noted deficits, most significantly due to decreased activity tolerance and decreased functional transfers/functional mobility. Pt to benefit from continued therapy services while hospitalized and at discharge to maximize pt's safety and independence in performing functional tasks. Pt currently requires high levels of physical assistance x1-2 during engagement in functional tasks. Prognosis: Good;Fair  Decision Making: Medium Complexity  REQUIRES OT FOLLOW UP: Yes  Activity Tolerance  Activity Tolerance: Patient limited by fatigue  Safety Devices  Safety Devices in place: Yes  Type of devices: Call light within reach;Nurse notified; Left in bed;Bed alarm in place  Restraints  Initially in place: No         Patient Diagnosis(es): The primary encounter diagnosis was Acute cholecystitis. Diagnoses of Dehydration and Nausea and vomiting, intractability of vomiting not specified, unspecified vomiting type were also pertinent to this visit. has a past medical history of A-fib [facility]), CVA (cerebral vascular accident) ([facility] [address] and Myocardial infarct, old.   has a past surgical history that includes Cardioversion (N/A, 2020). Restrictions  Restrictions/Precautions  Restrictions/Precautions: Fall Risk,Isolation  Required Braces or Orthoses?: No  Position Activity Restriction  Other position/activity restrictions:  Up with asssistance     Subjective   General  Patient assessed for rehabilitation services?: Yes  Response to previous treatment: Patient reporting fatigue but able to participate  Family / Caregiver Present: No  General Comment  Comments: RN ok'd for therapy this PM. Pt agreeable to participate in session following encouragement, pt pleasant/cooperative throughout. Vital Signs  Patient Currently in Pain: No     Objective    ADL  Grooming: Increased time to complete;Verbal cueing; Moderate assistance (seated upright supported in recliner chair to perform hand hygiene, facial hygiene, and comb hair)  UE Dressing: Maximum assistance;Verbal cueing; Increased time to complete (to doff/don hospital gown while seated upright in chair)  Toileting: Maximum assistance; Increased time to complete (pt able to perform pericare while seated in chair with setup and min A; pt required max A for brief mngt and bottom hygiene while static standing with use of RW)        Balance  Sitting Balance: Minimal assistance (L lateral lean)  Standing Balance: Moderate assistance (x1-2)  Standing Balance  Time: ~45 seconds, ~45 seconds, ~15 seconds, ~15 seconds  Activity: static standing with RW 2x during pericare/bottom hygiene and brief mngt; static standing in fran stedy 2x  Comment: Pt with high reliance of BUE on fran stedy frame and RW; pt with difficulty extending bilateral knees, forward trunk flexion, and significant R lateral lean therefore pt with difficulty reaching a fully upright position; pt reporting fatigue following minimal exertion and stating needing to return to seated EOB to rest, pt required prolonged seated rest breaks between standing bouts    Bed mobility  Supine to Sit:  (Pt up to chair upon arrival)  Sit to Supine: Maximum assistance (for LE progression)  Scooting: Moderate assistance     Transfers  Sit to stand:  Moderate assistance;2 Person assistance  Stand to sit: Moderate assistance;2 Person assistance  Transfer Comments: 4x sit <> stand transfers, 2x with RW and mod A x2, 2x in fran  in [facility] with mod A x; mod verbal/tactile cues for initiation/sequencing/safety awareness; increased time/effort to perform. Pt dependently transferred bed <> chair in [facility]. Cognition  Overall Cognitive Status: Exceptions  Arousal/Alertness: Delayed responses to stimuli  Following Commands: Follows one step commands with increased time; Follows one step commands with repetition  Attention Span: Attends with cues to redirect  Problem Solving: Assistance required to generate solutions;Assistance required to correct errors made;Assistance required to implement solutions  Insights: Decreased awareness of deficits  Initiation: Requires cues for some  Sequencing: Requires cues for some         Plan   Plan  Times per week: 5-6x/wk  Times per day: Daily  Current Treatment Recommendations: Strengthening,Patient/Caregiver Education & Training,Home Management Training,ROM,Equipment Evaluation, Education, & procurement,Balance Training,Endurance Training,Safety Education & Training,Self-Care / ADL       AM-PAC Score   AM-[facility] Inpatient Daily Activity Raw Score: 15 (01/27/22 1211)  AM-PAC Inpatient ADL T-Scale Score : 34.69 (01/27/22 1211)  ADL Inpatient CMS 0-100% Score: 56.46 (01/27/22 1211)  ADL Inpatient CMS G-Code Modifier : CK (01/27/22 1211)    Goals  Short term goals  Time Frame for Short term goals: Within 14 treatment sessions  Short term goal 1: Pt will demo Dynamic Sitting Balance at EOB (~10-12 minuites) with Min Assist while participating in functional BUE activity. Short term goal 2: Pt will participate in Self-feeding / Self-grooming with Min Assist with 50% accurate sequencing. Short term goal 3: Pt will participate in Toilet Transfers with [address] without LOB. Short term goal 4: Pt will maintain Static Standing Tolerance (~3-5 minutes) with Min Assist 1 person.        Therapy Time   Individual Concurrent Group Co-treatment Time In 1223         Time Out 1309         Minutes 46         Timed Code Treatment Minutes: 21 Minutes       Brianna Perez, OTR/L

## 2022-01-28 NOTE — DISCHARGE INSTR - COC
Continuity of Care Form    Patient Name: Disha Varghese   :  1935  MRN:  [id number]    Admit date:  2022  Discharge date:  22    Code Status Order: Full Code   Advance Directives:      Admitting Physician:  Alexus De León MD  PCP: Huey Bhagat MD    Discharging Nurse: [facility] Unit/Room#: 281/385-80  Discharging Unit Phone Number: [phone]    Emergency Contact:   Extended Emergency Contact Information  Primary Emergency Contact: Sarahi Ovalle, [address] Phone: [phone]  Relation: Child   needed? No  Secondary Emergency Contact: Sky Zimmerman  Home Phone: [phone]  Relation: Child   needed? No    Past Surgical History:  Past Surgical History:   Procedure Laterality Date    CARDIOVERSION N/A 2020    CARDIOVERSION performed by Cinthia Berry MD at [address] [place] [address]       Immunization History: There is no immunization history on file for this patient.     Active Problems:  Patient Active Problem List   Diagnosis Code    Paroxysmal atrial fibrillation ([facility]) I48.0    LBBB (left bundle branch block) I44.7    Primary hypertension I10    H/O TIA (transient ischemic attack) and stroke Z86.73    NSTEMI (non-ST elevated myocardial infarction) ([facility]) I21.4    Hypothyroid E03.9    Mild malnutrition ([facility]) E44.1    Near syncope R55    Chronic systolic heart failure ([facility]) I50.22    Atrial fibrillation, currently in sinus rhythm Z86.79    Symptomatic bradycardia R00.1    Anxiety F41.9    Diverticulosis of large intestine K57.30    History of Mohs micrographic surgery for skin cancer Z85.828, Z98.890    Hyperlipidemia E78.5    Mild pulmonary hypertension ([address] I27.20    Mild mitral regurgitation I34.0    Mild tricuspid regurgitation I07.1    Pseudophakia of both eyes Z96.1    Spinal stenosis of lumbar region M48.061    Hypokalemia E87.6    Acute biliary pancreatitis without infection or necrosis K85.10    Late onset Alzheimer's dementia without behavioral disturbance ([address] G30.1, F02.80    Severe protein-calorie malnutrition (HCC) E43    C. difficile colitis A04.72       Isolation/Infection:   Isolation            C Diff Contact          Patient Infection Status       Infection Onset Added Last Indicated Last Indicated By Review Planned Expiration Resolved Resolved By    C-diff Rule Out 01/26/22 01/26/22 01/26/22 C DIFF TOXIN/ANTIGEN (Ordered)        C-diff (Clostridium difficile) 01/13/22 01/14/22 01/13/22 C. difficile toxin Molecular 01/21/22       Resolved    C-diff Rule Out 01/13/22 01/13/22 01/13/22 C. difficile toxin Molecular (Ordered)   01/14/22 Rule-Out Test Resulted    COVID-19 (Rule Out) 09/24/20 09/24/20 09/24/20 COVID-19 (Ordered)   09/24/20 Rule-Out Test Resulted    COVID-19 (Rule Out) 04/15/20 04/15/20 04/15/20 COVID-19 (Ordered)   04/16/20 Rule-Out Test Resulted            Nurse Assessment:  Last Vital Signs: BP (!) 144/112   Pulse 105   Temp 97.3 °F (36.3 °C)   Resp 16   Ht 6' 6\" (1.981 m)   Wt 152 lb 8.9 oz (69.2 kg)   SpO2 97%   BMI 17.63 kg/m²     Last documented pain score (0-10 scale): Pain Level: 4  Last Weight:   Wt Readings from Last 1 Encounters:   01/27/22 152 lb 8.9 oz (69.2 kg)     Mental Status:  alert and able to concentrate and follow conversation    IV Access:  - None    Nursing Mobility/ADLs:  Walking   Dependent  Transfer  Dependent  Bathing  Dependent  Dressing  Dependent  Toileting  Dependent  Feeding  Assisted  Med Admin  Dependent  Med Delivery   prefers mixed with applesause or vanilla pudding    Wound Care Documentation and Therapy:        Elimination:  Continence: Bowel: No  Bladder: No  Urinary Catheter: None   Colostomy/Ileostomy/Ileal Conduit: No       Date of Last BM: 1/27/22    Intake/Output Summary (Last 24 hours) at 1/28/2022 0802  Last data filed at 1/27/2022 2119  Gross per 24 hour   Intake 3505 ml   Output --   Net 3505 ml     I/O last 3 completed shifts:   In: 4475.6 [I.V.:4423.3; IV Piggyback:52.3]  Out: -     Safety Concerns: At Risk for Falls and Aspiration Risk    Impairments/Disabilities:      Vision    Nutrition Therapy:  Current Nutrition Therapy:   - Oral Diet:  Dysphagia 2 mechanically altered    Routes of Feeding: Oral  Liquids: Honey Thick Liquids  Daily Fluid Restriction: no  Last Modified Barium Swallow with Video (Video Swallowing Test): not done    Treatments at the Time of Hospital Discharge:   Respiratory Treatments: none  Oxygen Therapy:  is not on home oxygen therapy. Ventilator:    - No ventilator support    Rehab Therapies: Physical Therapy, Occupational Therapy, and Speech/Language Therapy  Weight Bearing Status/Restrictions: No weight bearing restirctions  Other Medical Equipment (for information only, NOT a DME order):  wheelchair, walker, and bedside commode  Other Treatments: none    Patient's personal belongings (please select all that are sent with patient):  Glasses, Dentures upper    RN SIGNATURE:  Electronically signed by Karl Freed RN on 1/28/22 at 12:13 PM EST    CASE MANAGEMENT/SOCIAL WORK SECTION    Inpatient Status Date: 1/24/2022    Readmission Risk Assessment Score:  Readmission Risk              Risk of Unplanned Readmission:  21         Discharging to Facility/ Agency   Name: Tyrone Tejeda  Address:  Phone: [phone]  Fax:      / signature: Electronically signed by Araceli Young RN on 1/28/22 at 8:27 AM EST    PHYSICIAN SECTION    Prognosis: Fair    Condition at Discharge: Stable    Rehab Potential (if transferring to Rehab): Fair    Recommended Labs or Other Treatments After Discharge: pt/ot  Bmp Monday-monitor potassium levels  Cmp in 1 week  Resume lipitor 80mg daily once lft normalize    Physician Certification: I certify the above information and transfer of Esteban Garcia  is necessary for the continuing treatment of the diagnosis listed and that he requires [address] for less 30 days.      Update Admission H&P: No change in H&P    PHYSICIAN SIGNATURE:  Electronically signed by Eveline Kocher Blood, DO on 1/28/22 at 8:02 AM EST

## 2022-01-28 NOTE — PLAN OF CARE
Problem: Skin Integrity:  Goal: Will show no infection signs and symptoms  Outcome: Ongoing   Skin assessment preformed. Pt turned every 2 hours with heals elevated off bed. Waffle mattress in place. Will continue to monitor.

## 2022-01-28 NOTE — PROGRESS NOTES
Comprehensive Nutrition Assessment    Type and Reason for Visit:  Reassess    Nutrition Recommendations/Plan: Continue current diet as clinically appropriate. Head of bed at 90 degrees during feeding. Assistance with feeding as needed. Ensure Enlive, honey thick TID. Nutrition Assessment:  DC planning this evening to SNF. MD ordered remron to aid with appetite improvment and noted depression. RN documented % of BF consumed this morning (oatmeal, banana, brown sugar, milk). Hypokalemia continues with replacement ordered. Malnutrition Assessment:  Malnutrition Status:  Severe malnutrition    Context:  Chronic Illness     Findings of the 6 clinical characteristics of malnutrition:  Energy Intake:  7 - 75% or less estimated energy requirements for 1 month or longer  Weight Loss:  Unable to assess     Body Fat Loss:  7 - Severe body fat loss Orbital,Triceps,Buccal region   Muscle Mass Loss:  7 - Severe muscle mass loss Temples (temporalis),Clavicles (pectoralis & deltoids),Scapula (trapezius)  Fluid Accumulation:  Unable to assess     Strength:  Not Performed    Estimated Daily Nutrient Needs:  Energy (kcal):  2540 kcal/day; Weight Used for Energy Requirements:        Protein (g):  103-138; Weight Used for Protein Requirements:           Fluid (ml/day):  4241-7548 ml; Method Used for Fluid Requirements:  ml/Kg      Nutrition Related Findings:  Hypokalemia- sliding scale orderd. Elevated liver enzymes      Wounds:          Current Nutrition Therapies:    ADULT DIET; Dysphagia - Soft and Bite Sized; Low Fat (less than or equal to 50 gm/day);  Moderately Thick (Honey)  ADULT ORAL NUTRITION SUPPLEMENT; Breakfast, Lunch, Dinner; Standard High Calorie/High Protein Oral Supplement    Anthropometric Measures:  · Height: 6' 6\" (198.1 cm)  · Current Body Weight: 152 lb (68.9 kg)   · Admission Body Weight: 145 lb (65.8 kg)    · Usual Body Weight:       · Ideal Body Weight: 214 lbs; % Ideal Body Weight     · BMI: 17.6  · BMI Categories: Underweight (BMI less than 22) age over 72       Nutrition Diagnosis:   · Severe malnutrition,In context of chronic illness related to cognitive or neurological impairment as evidenced by NPO or clear liquid status due to medical condition,weight loss,severe loss of subcutaneous fat,severe muscle loss,lab values,vomiting,nausea      Nutrition Interventions:   Food and/or Nutrient Delivery:  Continue Current Diet,Start Oral Nutrition Supplement  Nutrition Education/Counseling:  Education not indicated,No recommendation at this time   Coordination of Nutrition Care:  Continue to monitor while inpatient,Speech Therapy,Coordination of Community Care,Interdisciplinary Rounds    Goals:  PO intakes >50% of meals       Nutrition Monitoring and Evaluation:   Behavioral-Environmental Outcomes:  None Identified   Food/Nutrient Intake Outcomes:  Diet Advancement/Tolerance,Food and Nutrient Intake,Supplement Intake,IVF Intake  Physical Signs/Symptoms Outcomes:  Chewing or Swallowing,Nausea or Vomiting,GI Status,Meal Time Behavior,Nutrition Focused Physical Findings,Weight,Biochemical Data     Discharge Planning:    Continue Oral Nutrition Supplement,Too soon to determine     Electronically signed by Sree Marina RD on 1/28/22 at 12:04 PM JASON Hernandez, RDN, LD  Registered Dietitian  [facility]  [phone]

## 2022-01-28 NOTE — FLOWSHEET NOTE
[address]  PROGRESS NOTE    Shift date: 1/28/22  Shift day: Friday   Shift # 1    Room # 502/167-27   Name: Rodri Metzger            Age: 82 y.o. Gender: male          Episcopal: Adventist    Referral: Visit with Patient prior to his discharge to another facility. Admit Date & Time: 1/24/2022  4:41 PM    PATIENT/EVENT DESCRIPTION:  Rodri Metzger is a 82 y.o. male with whom writer visited prior to his discharge to another facility. SPIRITUAL ASSESSMENT/INTERVENTION:  Mr. Patsy Moy was sitting in the bedside chair. He smiled and greeted writer. He shared how he was doing. He asked to turn off the TV. He was open to writer playing music and stated his preference as \"classical\" with \"strings. \" He asked writer to pray for him. He voiced his prayer requests. He offered a prayer after writer prayed for him. He thanked writer. Writer provided supportive presence and active listening; inquired about Pt's sources of support and strength; offered words of encouragement and support; affirmed Pt's strengths; selected classical music on Patient's computer; prayed with Patient. SPIRITUAL CARE FOLLOW-UP PLAN:  Chaplains will be available to provide emotional and spiritual support to Patient during Patient's hospitalization. 01/28/22 0955   Encounter Summary   Services provided to: Patient   Referral/Consult From: [address] Family members; Children   Continue Visiting   (1/28/22)   Complexity of Encounter Moderate   Length of Encounter 15 minutes   Spiritual Assessment Completed Yes   Routine   Type Follow up   Spiritual/Faith   Type Spiritual support   Assessment Calm; Approachable   Intervention Active listening;Explored feelings, thoughts, concerns;Explored coping resources;Sustaining presence/ Ministry of presence; Discussed belief system/Latter day practices/stacy;Prayer   Outcome Engaged in conversation;Expressed gratitude;Coping;Receptive Electronically signed by Barbara Corral on 1/28/2022 at 9:56 AM.  Som Ho  [phone]

## 2022-01-28 NOTE — PROGRESS NOTES
[facility]  Office: [address], , Ramo Ema, DO, Kent Mohs, DO, Oren Anderson, DO, Ailyn Mehta MD, Benita Segovia MD, Dafne Lutz MD, Mahogany Dejesus MD, Rizwana Harris MD, Brenda Warren MD, Arielle Vines MD, Ambar Fairchild, DO, Mercedes Santillan, DO, Poli Zhang MD,  Philip Aj DO, Nicolás Hebert MD, Ninoska Cox MD, Angel Caro MD, Vanda Rich MD, Halima Harp MD, Randall Christopher MD, Paul Ferrara MD, Cori Zavala, [facility], [facility] Rogelio, CNP, Lazaro Rodríguez, CNP, Wesley Bhatti, CNS, Fannie Manuel, [facility], Traci Duncan, CNP, Solo Salinas, CNP, Gisell Meade, CNP, Elizabeth Palacios, CNP, Facundo Solorzano PA-C, Alex Castañeda, [facility], Eladio Goldberg, [facility], Brooke Vasquez, CNP, Weston Richard, CNP, Dimas Bravo, CNP, Nisha Bliss, CNP, Ana Garcia, CNP, Betsy Durham [id number]    Progress Note    1/28/2022    7:59 AM    Name:   Fantasma Sutton  MRN:     [id number]     Acct:      [de-identified]   Room:   [address] [place] [address] Day:  4  Admit Date:  1/24/2022  4:41 PM    PCP:   Safia Rashid MD  Code Status:  Full Code    Subjective:     C/C:   Chief Complaint   Patient presents with    Emesis     cdiff diagnosis 2 weeks ago, started vanco 5 days ago, started vomiting x2 days, worried about dehydration     Interval History Status: improved. Denies abd pain; feels ok  No n/v    Knows he is in [facility]  Brief History:     Fantasma Sutton is a 82 y.o. Non- / non  male who presents with Emesis (cdiff diagnosis 2 weeks ago, started vanco 5 days ago, started vomiting x2 days, worried about dehydration)   and is admitted to the hospital for the management of Acute cholecystitis.     This 82 yom has had vomiting and diarrhea off and on since last September-diagnosed and treated for hep A at that time.   Then broke hip and underwent surgery last October-surgery went fairly well but  postop he had significant confusion for about 10 days. [facility]; received the monoclonal antibody and recovered after that. When he had covid, he was actually placed on hospice. This has since been revoked and he entered snf for rehab. He now started having vomiting again and was recently diagnosed with c dif and is on po vanco at [facility] for this. He has not been eating for some time now and continues to lose weight. He was transferred from [facility] with ongoing vomiting and poor po intake. Review of Systems:     Constitutional:  negative for chills, fevers, sweats  Respiratory:  negative for cough, dyspnea on exertion, shortness of breath, wheezing  Cardiovascular:  negative for chest pain, chest pressure/discomfort, lower extremity edema, palpitations  Gastrointestinal:  negative for constipation, diarrhea, nausea, vomiting, abdominal pain  Neurological:  negative for dizziness, headache    Medications:      Allergies:  No Known Allergies    Current Meds:   Scheduled Meds:    potassium chloride  20 mEq Oral Daily with breakfast    mirtazapine  15 mg Oral Nightly    apixaban  5 mg Oral BID    aspirin  81 mg Oral Daily    [Held by provider] atorvastatin  80 mg Oral Daily    buPROPion  150 mg Oral BID    FLUoxetine  40 mg Oral Daily    furosemide  20 mg Oral Daily    levothyroxine  100 mcg Oral Daily    lisinopril  40 mg Oral Daily    magnesium oxide  400 mg Oral Daily    metoprolol succinate  50 mg Oral Daily    pantoprazole  40 mg Oral QAM AC    vancomycin  125 mg Oral 4x Daily    sodium chloride flush  5-40 mL IntraVENous 2 times per day     Continuous Infusions:    dextrose 5% and 0.9% NaCl with KCl 20 mEq      sodium chloride       PRN Meds: sodium chloride flush, sodium chloride flush, sodium chloride, potassium chloride, magnesium sulfate, acetaminophen, HYDROcodone 5 mg - acetaminophen **OR** HYDROcodone 5 mg - acetaminophen, HYDROmorphone **OR** HYDROmorphone, ondansetron **OR** ondansetron, morphine **OR** morphine    Data:     Past Medical History:   has a past medical history of A-fib ([facility] [address] CVA (cerebral vascular accident) ([facility] [address] and Myocardial infarct, old. Social History:   reports that he has never smoked. He has never used smokeless tobacco.     Family History:   Family History   Family history unknown: Yes       Vitals:  BP (!) 144/112   Pulse 105   Temp 97.3 °F (36.3 °C)   Resp 16   Ht 6' 6\" (1.981 m)   Wt 152 lb 8.9 oz (69.2 kg)   SpO2 97%   BMI 17.63 kg/m²   Temp (24hrs), Av.9 °F (36.6 °C), Min:97.3 °F (36.3 °C), Max:98.3 °F (36.8 °C)    Recent Labs     22  0803   POCGLU 79       I/O (24Hr):     Intake/Output Summary (Last 24 hours) at 2022 0759  Last data filed at 2022 2119  Gross per 24 hour   Intake 3505 ml   Output --   Net 3505 ml       Labs:  Hematology:  Recent Labs     22  0544 22  0542 22  0529   WBC 3.7 3.6 3.0*   RBC 3.36* 3.46* 3.46*   HGB 9.9* 10.3* 10.3*   HCT 29.9* 32.0* 30.6*   MCV 88.9 92.3 88.4   MCH 29.5 29.8 29.7   MCHC 33.2 32.3 33.6   RDW 20.5* 19.7* 19.4*    158 172   MPV 7.7 7.8 7.3     Chemistry:  Recent Labs     22  0544 22  0542 22  0529   * 135 138   K 3.0* 3.3* 2.8*    105 104   CO2    GLUCOSE 91 91 92   BUN 12 7* 3*   CREATININE 0.85 0.66* 0.69*   ANIONGAP 7* 8* 8*   LABGLOM >60 >60 >60   GFRAA >60 >60 >60   CALCIUM 8.1* 8.0* 8.2*     Recent Labs     22  0803 22  0544 22  0542 22  0529   PROT  --  5.4* 5.5* 5.4*   LABALBU  --  2.3* 2.1* 2.0*   AST  --  205* 122* 79*   ALT  --  181* 134* 105*   ALKPHOS  --  478* 415* 381*   BILITOT  --  0.80 0.68 0.61   BILIDIR  --  0.44* 0.22 0.30   POCGLU 79  --   --   --      ABG:No results found for: POCPH, PHART, PH, POCPCO2, CQA3LSC, PCO2, POCPO2, PO2ART, PO2, POCHCO3, LJU8JSZ, HCO3, NBEA, PBEA, BEART, BE, THGBART, THB, VNF7EHR, AZPS4HTV, I2BFDHUN, O2SAT, FIO2  Lab Results   Component Value Date/Time    SPECIAL 10ML LEFT ARM 01/24/2022 08:09 PM     Lab Results   Component Value Date/Time    CULTURE NO GROWTH 3 DAYS 01/24/2022 08:09 PM       Radiology:  CT HEAD WO CONTRAST    Result Date: 1/24/2022  No acute intracranial abnormality. Senescent changes including chronic microvascular change with advancement since prior exam.     CT CERVICAL SPINE WO CONTRAST    Result Date: 1/24/2022  No acute bony abnormality of the cervical spine on a background of advanced degenerative changes. CT ABDOMEN PELVIS W IV CONTRAST Additional Contrast? None    Result Date: 1/24/2022  Suboptimal study due to patient motion. Significantly distended gallbladder with gallbladder wall thickening. Findings are concerning for acute cholecystitis. For further evaluation right upper quadrant ultrasound is recommended. Interval development of mild intra and extrahepatic biliary dilatation. Apparent stranding within the upper abdomen predominantly surrounding the pancreas. Finding is likely artifactual related to extensive patient motion. However correlation with the patient lab profile is recommended to exclude pancreatitis. No discrete evidence of colitis is noted on the acquired images. Supra iliac infrarenal aortic aneurysm, measuring approximately 2.4 x 3.2 cm. 3 year follow-up is recommended. US GALLBLADDER RUQ    Result Date: 1/25/2022  1. Limited exam. 2.  Limited visualization of the gallbladder demonstrates mild wall thickening, nonspecific. Consider follow-up with hepatobiliary scan to exclude the possibility of acute cholecystitis. 3.  The biliary tree cannot be assessed on this exam. RECOMMENDATIONS: Unavailable     XR CHEST PORTABLE    Result Date: 1/24/2022  No acute cardiopulmonary disease. mrcp 1/27/22:  Impression:     No biliary ductal dilatation or choledocholithiasis.  Small pleural   effusions.  Retroperitoneal edema compatible with acute pancreatitis. Physical Examination:       General appearance:  alert, cooperative and no distress  Mental Status:  oriented to person, place  and normal affect; said it was 1922  Lungs:  clear to auscultation bilaterally, normal effort  Heart:  regular rate and rhythm, no murmur  Abdomen:  soft, nontender, nondistended, normal bowel sounds, no masses, hepatomegaly, splenomegaly  Extremities:  no edema, redness, tenderness in the calves  Skin:  no gross lesions, rashes, induration    Assessment:     Hospital Problems           Last Modified POA    * (Principal) Acute biliary pancreatitis without infection or necrosis 1/27/2022 Yes    Paroxysmal atrial fibrillation ([address] 1/25/2022 Yes    Primary hypertension 1/25/2022 Yes    Hypothyroid 1/25/2022 Yes    Chronic systolic heart failure ([address] 1/25/2022 Yes    Hypokalemia 1/28/2022 Yes    Late onset Alzheimer's dementia without behavioral disturbance ([facility] [address] 1/25/2022 Yes    Severe protein-calorie malnutrition ([facility] [address] 1/25/2022 Yes    C. difficile colitis 1/25/2022 Yes                Plan:     1. Cont po vanco for c dif  2. Replace k  3. Resumed eliquis since no procedures needed  4. Pt/ot  5. Added remeron to help with appetite and depression  6. Holding lipitor due to elevated lft  7. ready for discharge to snf to try therapy; if he does not do well with therapy and does not eat, then son may consider hospice at that time  8.  Dc to snf    Godfrey Anderson DO  1/28/2022  7:59 AM

## 2022-01-28 NOTE — PROGRESS NOTES
Physical Therapy  Facility/Department: [facility] SURG ICU  Daily Treatment Note  NAME: Eri Xiong  : 1935  MRN: [id number]    Date of Service: 2022    Discharge Recommendations:  Patient would benefit from continued therapy after discharge   PT Equipment Recommendations  Equipment Needed: No  Other: Continue to assess pending improvement with mobility    Assessment   Body structures, Functions, Activity limitations: Decreased functional mobility ; Decreased balance;Decreased strength;Decreased safe awareness;Decreased endurance  Assessment: Pt with improving tolerance to additional mobility- able to stand at [facility] today with mod Ax2 and somewhat improved balance with use of mirror for feedback. Pt would be unsafe to return to his prior living arrangement and would benefit from further therapy at discharge to increase his safety and independence with mobility. Prognosis: Fair  PT Education: Transfer Training;Functional Mobility Training;Equipment  REQUIRES PT FOLLOW UP: Yes  Activity Tolerance  Activity Tolerance: Patient limited by fatigue;Patient limited by endurance     Patient Diagnosis(es): The primary encounter diagnosis was Acute cholecystitis. Diagnoses of Dehydration and Nausea and vomiting, intractability of vomiting not specified, unspecified vomiting type were also pertinent to this visit. has a past medical history of A-fib [facility]), CVA (cerebral vascular accident) ([facility] [address] and Myocardial infarct, old.   has a past surgical history that includes Cardioversion (N/A, 2020). Restrictions  Restrictions/Precautions  Restrictions/Precautions: Fall Risk,Isolation  Required Braces or Orthoses?: No  Implants present? : Metal implants  Position Activity Restriction  Other position/activity restrictions: Up with asssistance  Subjective   General  Response To Previous Treatment: Patient with no complaints from previous session.   Family / Caregiver Present: No  Subjective  Subjective: Pt up to chair upon arrival and agreeable to therapy. Pt without pain complaints. Pain Screening  Patient Currently in Pain: No  Vital Signs  Patient Currently in Pain: No       Cognition   Cognition  Overall Cognitive Status: Exceptions  Arousal/Alertness: Delayed responses to stimuli  Following Commands: Follows one step commands with increased time; Follows one step commands with repetition  Attention Span: Attends with cues to redirect  Problem Solving: Assistance required to generate solutions;Assistance required to correct errors made;Assistance required to implement solutions  Insights: Decreased awareness of deficits  Initiation: Requires cues for some  Sequencing: Requires cues for some  Objective   Bed mobility  Supine to Sit:  (Pt up to chair upon arrival)  Sit to Supine: Maximum assistance (for LE progression)  Scooting: Moderate assistance  Transfers  Sit to Stand: Moderate Assistance;2 Person Assistance (Mod Ax2 at [facility]; mod Ax1 Tama Degree stedy)  Stand to sit: Moderate Assistance;2 Person Assistance (Mod Ax2 at [facility]; mod Ax1 Tama Degree stedy)  Bed to Chair: Dependent/Total ([facility])  Comment: Pt stood two times with  and twice in the [facility]. Pt able to tolerate 45 seconds of standing at the  with mod A x2 to maintain. Ambulation  Ambulation?: No  Stairs/Curb  Stairs?: No     Balance  Posture: Poor  Sitting - Static: Fair;-  Sitting - Dynamic: Poor;+  Standing - Static: Poor  Standing - Dynamic: Poor  Comments: Standing balance assessed at  and [facility]. Mirror placed in front of patient with partial fixture of seated and standing posture. Pt with strong left lean in seated requiring mod A in unsupported seated. Seated LE exercise program: Long Arc Quads, hip abduction/adduction, heel/toe raises, and marches.  Reps: 20 bilateral with verbal cues and tactile/visual targets           AM-PAC Score     AM-PAC Inpatient Mobility without Stair Climbing Raw Score : 8 (01/28/22 1331)  AM-PAC Inpatient without Stair Climbing T-Scale Score : 30.65 (01/28/22 1331)  Mobility Inpatient CMS 0-100% Score: 80.91 (01/28/22 1331)  Mobility Inpatient without Stair CMS G-Code Modifier : CM (01/28/22 1331)       Goals  Short term goals  Time Frame for Short term goals: 14 visits  Short term goal 1: Pt to perform supine LE PRE's AAROM x 15 reps to improve/maintain ROM and strength for transfers.   Short term goal 2: Pt to demonstrate CGA bed mobility  Short term goal 3: Pt to sit <> stand transfer CGA  Short term goal 4: Pt to ambulate 15ft mod A with RW  Short term goal 5: Pt to demonstrate good - seated and fair + standing balance to decrease risk of falls  Patient Goals   Patient goals : None stated    Plan    Plan  Times per week: 5-6x/week  Times per day: Daily  Current Treatment Recommendations: Strengthening,Safety Education & Training,Balance Training,Endurance Training,Patient/Caregiver Education & Training,Functional Mobility Training,Transfer Training,Gait Training,Neuromuscular Re-education,Wheelchair Mobility Training  Safety Devices  Type of devices: Call light within reach,Gait belt,Nurse notified,Bed alarm in place,Left in bed  Restraints  Initially in place: No     Therapy Time   Individual Concurrent Group Co-treatment   Time In 1223         Time Out 1309         Minutes 46         Timed Code Treatment Minutes: 23 Minutes   Co treatment with OT secondary to level of assistance required with mobility    Mari Petty, PT

## 2022-01-28 NOTE — CARE COORDINATION
Discharge [address] [facility] Case Management Department  Written by: Leana Fierro RN    Patient Name: Jeffrey Arndt  Attending Provider: Jayden Anderson DO  Admit Date: 2022  4:41 PM  MRN: [id number]  Account: [de-identified]                     : 1935  Discharge Date: 2022      Disposition: [facility] p/u at [address] [facility] via [facility] [address] ambulance    Leana Fierro RN

## 2022-01-28 NOTE — PLAN OF CARE
Problem: Falls - Risk of:  Goal: Will remain free from falls  1/28/2022 1537 by Linda Herndon RN  Outcome: Ongoing   Fall assessment preformed. Bed in low locked position with call light and tray table within reach. Education given. Will continue to monitor.

## 2022-01-28 NOTE — PLAN OF CARE
Problem: Falls - Risk of:  Goal: Will remain free from falls  Description: Will remain free from falls  Outcome: Ongoing   No falls/injuries this shift, bed in lowest position, brakes on, bed alarm on, call light in reach, side rails up x2   Problem: Nutrition  Goal: Optimal nutrition therapy  Outcome: Ongoing   Noted Appetite improving this shift  Problem: Skin Integrity:  Goal: Absence of new skin breakdown  Description: Absence of new skin breakdown  Outcome: Ongoing   No new skin breakdown noted, no signs/symptoms of infection, continue to monitor labwork including WBC, medications ordered

## 2022-01-28 NOTE — DISCHARGE SUMMARY
[facility]  Office: [address], DO, Mark Pérezderrell, DO, Amiegabriel Maldonado, DO, Americo Anderson, DO, Candace Galan MD, Neva Cummings MD, Em Seals MD, Kendra Butler MD, Preston Cotto MD, Fadia Rich MD, Kelsey Ralph MD, Tiapalomo Saqib, DO, Zane Jasmine, DO, Betty Gil MD,  Henok Samaniego, DO, Romana Begin, MD, Nishant Macias MD, Ronda Diaz MD, Robbie Bowser MD, Karolina Rhodes MD, Margie Ellis, [facility], [facility], CNP, [facility], CNP, Stacie Solomon, CNS, Donnell Tavera, CNP, Thien Lowe, CNP, Ryland Thacker, CNP, Parish Chapman, CNP, [place], CNP, Celestina Matias PA-C, Robson Franco, [facility], Verena Thompson, [facility], Yanni Sexton, CNP, Juan Alberto Kumar, CNP, Gino Amezcua, CNP, Migel Vivas, CNP, Gautam Arellano, CNP, Rosaline Nichols, CNP         [address] [facility]    Discharge Summary     Patient ID: Hafsa Solitario  :  1935   MRN: [id number]     ACCOUNT:  [de-identified]   Patient's PCP: Natalie Nixon MD  Admit Date: 2022   Discharge Date: 2022     Length of Stay: 4  Code Status:  Full Code  Admitting Physician: Preston Cotto MD  Discharge Physician: Jocelyne Anderson DO     Active Discharge Diagnoses:     Hospital Problem Lists:  Principal Problem:    Acute biliary pancreatitis without infection or necrosis  Active Problems:    Paroxysmal atrial fibrillation (HCC)    Primary hypertension    Hypothyroid    Chronic systolic heart failure (HCC)    Hypokalemia    Late onset Alzheimer's dementia without behavioral disturbance (HCC)    Severe protein-calorie malnutrition ([facility] [address]    C. difficile colitis  Resolved Problems:    * No resolved hospital problems.  *      Admission Condition:  poor     Discharged Condition: stable    Hospital Stay:     Hospital Course:  Hafsa Solitario is a 82 y.o. male who was admitted for the management of  Acute biliary pancreatitis without infection or necrosis , presented to ER with Emesis (cdiff diagnosis 2 weeks ago, started vanco 5 days ago, started vomiting x2 days, worried about dehydration)    Admitted with abd pain and abnormal cat scan abdomen concerning for acute cholecystitis. Placed on zosyn and evaluated by gen surg. Not felt to be a good surgical candidate. gb us did not suggest acute gracie but he also had lab evidence of pancreatitis, confirmed by mrcp.  lft were elevated but mrcp did not show retained cbd stone. With lft dropping, he likely passed a gallstone which caused pancreatitis as cause of symptoms. eliquis initially held until it was determined he would not need any procedures, then it was resumed for paf. He is very weak, having had numerous issues in last few months and will go to snf for pt/ot. If he does not do well, son would consider hospice at that time    remeron added for depression and appetite stimulation      Significant therapeutic interventions: see above    Significant Diagnostic Studies:   Labs / Micro:  CBC:   Lab Results   Component Value Date    WBC 3.0 01/28/2022    RBC 3.46 01/28/2022    HGB 10.3 01/28/2022    HCT 30.6 01/28/2022    MCV 88.4 01/28/2022    MCH 29.7 01/28/2022    MCHC 33.6 01/28/2022    RDW 19.4 01/28/2022     01/28/2022     CMP:    Lab Results   Component Value Date    GLUCOSE 92 01/28/2022     01/28/2022    K 2.8 01/28/2022     01/28/2022    CO2 26 01/28/2022    BUN 3 01/28/2022    CREATININE 0.69 01/28/2022    ANIONGAP 8 01/28/2022    ALKPHOS 381 01/28/2022     01/28/2022    AST 79 01/28/2022    BILITOT 0.61 01/28/2022    LABALBU 2.0 01/28/2022    ALBUMIN 0.6 01/28/2022    LABGLOM >60 01/28/2022    GFRAA >60 01/28/2022    GFR      01/28/2022    GFR NOT REPORTED 01/28/2022    PROT 5.4 01/28/2022    CALCIUM 8.2 01/28/2022        Radiology:  CT HEAD WO CONTRAST    Result Date: 1/24/2022  No acute intracranial abnormality.   Senescent changes including chronic microvascular change with advancement since prior exam.     CT CERVICAL SPINE WO CONTRAST    Result Date: 1/24/2022  No acute bony abnormality of the cervical spine on a background of advanced degenerative changes. CT ABDOMEN PELVIS W IV CONTRAST Additional Contrast? None    Result Date: 1/24/2022  Suboptimal study due to patient motion. Significantly distended gallbladder with gallbladder wall thickening. Findings are concerning for acute cholecystitis. For further evaluation right upper quadrant ultrasound is recommended. Interval development of mild intra and extrahepatic biliary dilatation. Apparent stranding within the upper abdomen predominantly surrounding the pancreas. Finding is likely artifactual related to extensive patient motion. However correlation with the patient lab profile is recommended to exclude pancreatitis. No discrete evidence of colitis is noted on the acquired images. Supra iliac infrarenal aortic aneurysm, measuring approximately 2.4 x 3.2 cm. 3 year follow-up is recommended. US GALLBLADDER RUQ    Result Date: 1/25/2022  1. Limited exam. 2.  Limited visualization of the gallbladder demonstrates mild wall thickening, nonspecific. Consider follow-up with hepatobiliary scan to exclude the possibility of acute cholecystitis. 3.  The biliary tree cannot be assessed on this exam. RECOMMENDATIONS: Unavailable     XR CHEST PORTABLE    Result Date: 1/24/2022  No acute cardiopulmonary disease. MRI ABDOMEN WO CONTRAST MRCP    Result Date: 1/27/2022  No biliary ductal dilatation or choledocholithiasis. Small pleural effusions. Retroperitoneal edema compatible with acute pancreatitis. Consultations:    Consults:     Final Specialist Recommendations/Findings:   IP CONSULT TO GENERAL SURGERY      The patient was seen and examined on day of discharge and this discharge summary is in conjunction with any daily progress note from day of discharge. Discharge plan:     Disposition: [facility]    Physician Follow Up:      Robin Lira MD [phone]  [place] [phone]    In 4 weeks         Requiring Further Evaluation/Follow Up POST HOSPITALIZATION/Incidental Findings: recheck lft and resume lipitor once they normalize    Diet: regular diet    Activity: As tolerated    Instructions to Patient: take medications as prescribed      Discharge Medications:      Medication List      START taking these medications    mirtazapine 15 MG tablet  Commonly known as: REMERON  Take 1 tablet by mouth nightly        CHANGE how you take these medications    Aspirin Low Dose 81 MG EC tablet  Generic drug: aspirin  Take 1 tablet by mouth daily  What changed:   · how much to take  · how to take this  · when to take this     FLUoxetine 40 MG capsule  Commonly known as: PROZAC  Take 1 capsule by mouth daily  What changed:   · how much to take  · how to take this  · when to take this     levothyroxine 100 MCG tablet  Commonly known as: SYNTHROID  Take 1 tablet by mouth Daily  What changed:   · how much to take  · how to take this  · when to take this     lisinopril 40 MG tablet  Commonly known as: PRINIVIL;ZESTRIL  Take 1 tablet by mouth daily  What changed:   · how much to take  · how to take this  · when to take this     metoprolol succinate 50 MG extended release tablet  Commonly known as: TOPROL XL  Take 1 tablet by mouth daily  What changed:   · how much to take  · how to take this  · when to take this     omeprazole 20 MG delayed release capsule  Commonly known as: PRILOSEC  Take 1 capsule by mouth Daily  What changed:   · how much to take  · how to take this  · when to take this     potassium chloride 20 MEQ extended release tablet  Commonly known as: KLOR-CON M  Take 1 tablet by mouth 2 times daily  What changed:   · how much to take  · how to take this  · when to take this        CONTINUE taking these medications    apixaban 5 MG Tabs tablet  Commonly known as: ELIQUIS  Take 1 tablet by mouth 2 times daily     buPROPion 150 MG extended release tablet  Commonly known as: WELLBUTRIN SR     furosemide 20 MG tablet  Commonly known as: LASIX  Take 1 tablet by mouth daily     magnesium oxide 400 MG tablet  Commonly known as: MAG-OX     ondansetron 4 MG disintegrating tablet  Commonly known as: ZOFRAN-ODT     vancomycin 125 MG capsule  Commonly known as: VANCOCIN  Take 1 capsule by mouth 4 times daily for 10 days        STOP taking these medications    atorvastatin 80 MG tablet  Commonly known as: LIPITOR     traMADol 50 MG tablet  Commonly known as: ULTRAM           Where to Get Your Medications      Information about where to get these medications is not yet available    Ask your nurse or doctor about these medications  · Aspirin Low Dose 81 MG EC tablet  · FLUoxetine 40 MG capsule  · levothyroxine 100 MCG tablet  · lisinopril 40 MG tablet  · metoprolol succinate 50 MG extended release tablet  · mirtazapine 15 MG tablet  · omeprazole 20 MG delayed release capsule  · potassium chloride 20 MEQ extended release tablet  · vancomycin 125 MG capsule         No discharge procedures on file. Time Spent on discharge is  35 mins in patient examination, evaluation, counseling as well as medication reconciliation, prescriptions for required medications, discharge plan and follow up. Electronically signed by   Jose Guadalupe Anderson DO  1/28/2022  9:35 AM      Thank you Dr. Junior Reaves MD for the opportunity to be involved in this patient's care.

## 2022-01-29 LAB
CULTURE: NORMAL
CULTURE: NORMAL
Lab: NORMAL
Lab: NORMAL
SPECIMEN DESCRIPTION: NORMAL
SPECIMEN DESCRIPTION: NORMAL

## (undated) DEVICE — MEDI-TRACE CADENCE ADULT, DEFIBRILLATION ELECTRODE -RTS  (10 PR/PK) - PHYSIO-CONTROL: Brand: MEDI-TRACE CADENCE